# Patient Record
Sex: MALE | Race: WHITE | Employment: OTHER | URBAN - METROPOLITAN AREA
[De-identification: names, ages, dates, MRNs, and addresses within clinical notes are randomized per-mention and may not be internally consistent; named-entity substitution may affect disease eponyms.]

---

## 2017-02-21 ENCOUNTER — HOSPITAL ENCOUNTER (OUTPATIENT)
Dept: MRI IMAGING | Age: 82
Discharge: HOME | End: 2017-02-21
Attending: ORTHOPAEDIC SURGERY
Payer: MEDICARE

## 2017-02-21 DIAGNOSIS — M54.16 LUMBAR RADICULOPATHY: ICD-10-CM

## 2017-02-21 DIAGNOSIS — M51.36 DDD (DEGENERATIVE DISC DISEASE), LUMBAR: ICD-10-CM

## 2017-02-21 PROCEDURE — 72148 MRI LUMBAR SPINE W/O DYE: CPT

## 2018-01-04 ENCOUNTER — HOSPITAL ENCOUNTER (OUTPATIENT)
Dept: PREADMISSION TESTING | Age: 83
Discharge: HOME | End: 2018-01-04
Payer: MEDICARE

## 2018-01-04 ENCOUNTER — HOSPITAL ENCOUNTER (OUTPATIENT)
Dept: GENERAL RADIOLOGY | Age: 83
Discharge: HOME | End: 2018-01-04
Attending: ORTHOPAEDIC SURGERY
Payer: MEDICARE

## 2018-01-04 ENCOUNTER — APPOINTMENT (OUTPATIENT)
Dept: GENERAL RADIOLOGY | Age: 83
End: 2018-01-04
Attending: ORTHOPAEDIC SURGERY
Payer: MEDICARE

## 2018-01-04 VITALS
DIASTOLIC BLOOD PRESSURE: 70 MMHG | HEIGHT: 67 IN | TEMPERATURE: 99.1 F | RESPIRATION RATE: 21 BRPM | HEART RATE: 74 BPM | WEIGHT: 219.8 LBS | BODY MASS INDEX: 34.5 KG/M2 | OXYGEN SATURATION: 94 % | SYSTOLIC BLOOD PRESSURE: 143 MMHG

## 2018-01-04 LAB
ABO + RH BLD: NORMAL
ALBUMIN SERPL-MCNC: 3.3 G/DL (ref 3.5–5)
ALBUMIN/GLOB SERPL: 0.9 {RATIO} (ref 1.1–2.2)
ALP SERPL-CCNC: 117 U/L (ref 45–117)
ALT SERPL-CCNC: 21 U/L (ref 12–78)
ANION GAP SERPL CALC-SCNC: 11 MMOL/L (ref 5–15)
APPEARANCE UR: CLEAR
AST SERPL-CCNC: 18 U/L (ref 15–37)
ATRIAL RATE: 74 BPM
BACTERIA URNS QL MICRO: NEGATIVE /HPF
BASOPHILS # BLD: 0 K/UL (ref 0–0.1)
BASOPHILS NFR BLD: 0 % (ref 0–1)
BILIRUB SERPL-MCNC: 0.4 MG/DL (ref 0.2–1)
BILIRUB UR QL: NEGATIVE
BLOOD GROUP ANTIBODIES SERPL: NORMAL
BUN SERPL-MCNC: 13 MG/DL (ref 6–20)
BUN/CREAT SERPL: 12 (ref 12–20)
CALCIUM SERPL-MCNC: 8.9 MG/DL (ref 8.5–10.1)
CALCULATED P AXIS, ECG09: 85 DEGREES
CALCULATED R AXIS, ECG10: -18 DEGREES
CALCULATED T AXIS, ECG11: 49 DEGREES
CHLORIDE SERPL-SCNC: 104 MMOL/L (ref 97–108)
CO2 SERPL-SCNC: 29 MMOL/L (ref 21–32)
COLOR UR: ABNORMAL
CREAT SERPL-MCNC: 1.1 MG/DL (ref 0.7–1.3)
CRP SERPL-MCNC: 0.88 MG/DL
DIAGNOSIS, 93000: NORMAL
EOSINOPHIL # BLD: 0.1 K/UL (ref 0–0.4)
EOSINOPHIL NFR BLD: 2 % (ref 0–7)
EPITH CASTS URNS QL MICRO: ABNORMAL /LPF
ERYTHROCYTE [DISTWIDTH] IN BLOOD BY AUTOMATED COUNT: 14.9 % (ref 11.5–14.5)
ERYTHROCYTE [SEDIMENTATION RATE] IN BLOOD: 43 MM/HR (ref 0–20)
EST. AVERAGE GLUCOSE BLD GHB EST-MCNC: 103 MG/DL
GLOBULIN SER CALC-MCNC: 3.7 G/DL (ref 2–4)
GLUCOSE SERPL-MCNC: 94 MG/DL (ref 65–100)
GLUCOSE UR STRIP.AUTO-MCNC: NEGATIVE MG/DL
HBA1C MFR BLD: 5.2 % (ref 4.2–6.3)
HCT VFR BLD AUTO: 37.9 % (ref 36.6–50.3)
HGB BLD-MCNC: 12.3 G/DL (ref 12.1–17)
HGB UR QL STRIP: NEGATIVE
HYALINE CASTS URNS QL MICRO: ABNORMAL /LPF (ref 0–5)
KETONES UR QL STRIP.AUTO: NEGATIVE MG/DL
LEUKOCYTE ESTERASE UR QL STRIP.AUTO: NEGATIVE
LYMPHOCYTES # BLD: 1 K/UL (ref 0.8–3.5)
LYMPHOCYTES NFR BLD: 12 % (ref 12–49)
MCH RBC QN AUTO: 28.1 PG (ref 26–34)
MCHC RBC AUTO-ENTMCNC: 32.5 G/DL (ref 30–36.5)
MCV RBC AUTO: 86.7 FL (ref 80–99)
MONOCYTES # BLD: 0.6 K/UL (ref 0–1)
MONOCYTES NFR BLD: 7 % (ref 5–13)
NEUTS SEG # BLD: 6.8 K/UL (ref 1.8–8)
NEUTS SEG NFR BLD: 79 % (ref 32–75)
NITRITE UR QL STRIP.AUTO: NEGATIVE
P-R INTERVAL, ECG05: 160 MS
PH UR STRIP: 6.5 [PH] (ref 5–8)
PLATELET # BLD AUTO: 236 K/UL (ref 150–400)
POTASSIUM SERPL-SCNC: 3.9 MMOL/L (ref 3.5–5.1)
PROT SERPL-MCNC: 7 G/DL (ref 6.4–8.2)
PROT UR STRIP-MCNC: ABNORMAL MG/DL
Q-T INTERVAL, ECG07: 388 MS
QRS DURATION, ECG06: 112 MS
QTC CALCULATION (BEZET), ECG08: 430 MS
RBC # BLD AUTO: 4.37 M/UL (ref 4.1–5.7)
RBC #/AREA URNS HPF: ABNORMAL /HPF (ref 0–5)
SODIUM SERPL-SCNC: 144 MMOL/L (ref 136–145)
SP GR UR REFRACTOMETRY: 1.02 (ref 1–1.03)
SPECIMEN EXP DATE BLD: NORMAL
UA: UC IF INDICATED,UAUC: ABNORMAL
UROBILINOGEN UR QL STRIP.AUTO: 0.2 EU/DL (ref 0.2–1)
VENTRICULAR RATE, ECG03: 74 BPM
WBC # BLD AUTO: 8.6 K/UL (ref 4.1–11.1)
WBC URNS QL MICRO: ABNORMAL /HPF (ref 0–4)

## 2018-01-04 PROCEDURE — 86901 BLOOD TYPING SEROLOGIC RH(D): CPT | Performed by: ORTHOPAEDIC SURGERY

## 2018-01-04 PROCEDURE — 36415 COLL VENOUS BLD VENIPUNCTURE: CPT | Performed by: ORTHOPAEDIC SURGERY

## 2018-01-04 PROCEDURE — 85025 COMPLETE CBC W/AUTO DIFF WBC: CPT | Performed by: ORTHOPAEDIC SURGERY

## 2018-01-04 PROCEDURE — 85652 RBC SED RATE AUTOMATED: CPT | Performed by: ORTHOPAEDIC SURGERY

## 2018-01-04 PROCEDURE — 86140 C-REACTIVE PROTEIN: CPT | Performed by: ORTHOPAEDIC SURGERY

## 2018-01-04 PROCEDURE — 80053 COMPREHEN METABOLIC PANEL: CPT | Performed by: ORTHOPAEDIC SURGERY

## 2018-01-04 PROCEDURE — 71046 X-RAY EXAM CHEST 2 VIEWS: CPT

## 2018-01-04 PROCEDURE — 84466 ASSAY OF TRANSFERRIN: CPT | Performed by: ORTHOPAEDIC SURGERY

## 2018-01-04 PROCEDURE — 83036 HEMOGLOBIN GLYCOSYLATED A1C: CPT | Performed by: ORTHOPAEDIC SURGERY

## 2018-01-04 PROCEDURE — 93005 ELECTROCARDIOGRAM TRACING: CPT

## 2018-01-04 PROCEDURE — 81001 URINALYSIS AUTO W/SCOPE: CPT | Performed by: ORTHOPAEDIC SURGERY

## 2018-01-04 RX ORDER — HYDROCODONE BITARTRATE AND ACETAMINOPHEN 5; 325 MG/1; MG/1
1 TABLET ORAL
COMMUNITY
End: 2018-01-18

## 2018-01-04 RX ORDER — MONTELUKAST SODIUM 10 MG/1
10 TABLET ORAL EVERY EVENING
COMMUNITY

## 2018-01-04 RX ORDER — GABAPENTIN 300 MG/1
300 CAPSULE ORAL 3 TIMES DAILY
COMMUNITY

## 2018-01-04 RX ORDER — BUSPIRONE HYDROCHLORIDE 10 MG/1
10 TABLET ORAL 2 TIMES DAILY
COMMUNITY

## 2018-01-04 RX ORDER — RABEPRAZOLE SODIUM 20 MG/1
20 TABLET, DELAYED RELEASE ORAL DAILY
COMMUNITY

## 2018-01-04 NOTE — PERIOP NOTES
[facility]  PREOPERATIVE INSTRUCTIONS    Surgery Date:   Friday 1/12/18   Surgery arrival time given by surgeon: NO  (If [facility] staff will call you between 3pm - 7pm the day before surgery with your arrival time. If your surgery is on a Monday, we will call you the preceding Friday. Please call [phone] after 7pm if you did not receive your arrival time.) Phone call verification on Thursday 1/11/18. 1. Report  to the [address] [facility] on the day of your surgery. Bring your insurance card, photo identification, and any copayment (if applicable). 2. You must have a responsible adult to drive you home and stay with you the first 24 hours after surgery if you are going home the same day of your surgery. 3. Nothing to eat or drink after midnight the night before surgery. This means NO water, gum, mints, coffee, juice, etc.    4. MEDICATIONS TO TAKE THE MORNING OF SURGERY WITH A SIP OF WATER: Rabeprazole,Hydrocodone,Gabapentin,Fluoxetene,Metoclopramide, Buspirone. 5. No alcoholic beverages 24 hours before and after your surgery. 6. If you are being admitted to the hospital,please leave personal belongings/luggage in your car until you have an assigned hospital room number. ( The hospital discharge time is 12 PM NOON. Your adult  should be at the hospital prior to the noon discharge time unless otherwise instructed.)   7. STOP Aspirin and/or any non-steroidal anti-inflammatory drugs (i.e. Ibuprofen, Naproxen, Advil, Aleve) as directed by your surgeon. You may take Tylenol. Stop herbal supplements 1 week prior to  surgery. 8. If you are currently taking Plavix, Coumadin,or any other blood-thinning/ anticoagulant medication contact your surgeon for instructions. 9. Wear comfortable clothes. Wear your glasses instead of contacts. Please leave all money, jewelry and valuables at home. No make up, particularly mascara, the day of surgery.    10.  REMOVE ALL body piercings, rings,and jewelry and leave at home.  Wear your hair loose or down, no pony-tails, buns, or any metal hair clips. 11. If you shower the morning of surgery, please do not apply any lotions, powders, or deodorants afterwards. Do not shave any body area within 24 hours of your surgery. 12. Please follow all instructions to avoid any potential surgical cancellation. 13. Should your physical condition change, (i.e. fever, cold, flu, etc.) please notify your surgeon as soon as possible. 14. It is important to be on time. If a situation occurs where you may be delayed, please call:  [phone] / [phone] on the day of surgery. 15. The Preadmission Testing staff can be reached at [phone]. 16.  Special Instructions:  Use Chlorhexidine Care Fusion wash and sponges 3 days prior to surgery as instructed. Incentive spirometer given with instructions to practice at home and bring back to the hospital on the day of surgery. [facility] will contact you if your Hemoglobin A1C is greater than 7.5. Ensure/Glucerna  sample, nutritional information, and Ensure/Glucerna coupon given. Pain pamphlet and Call Don't Fall reminder reviewed with patient.  parking is complimentary Monday - Friday 7 am - 5 pm  Bring PTA Medication list day of surgery with the last doses taken documented   Do not bring medication bottles the day of surgery  16. The patient was contacted  in person. He  verbalize  understanding of all instructions does not  need reinforcement.

## 2018-01-04 NOTE — H&P
PAT Pre-Op History & Physical    Patient: Aura Barragan                  MRN: [id number]          SSN: [id number]  YOB: 1935          Age: 81 y.o. Sex: male                Subjective:     Patient is a 81 y.o.  male who presents with history of chronic right hip pain that started about three years ago. The pain is concentrated in his hip and will go down his leg to his knee. He uses a walker while in the house and presents today in a wheelchair. He states that sometimes the pain will wake him up at night. If he turns the wrong way the pain will become worse. Pain level ranges from 3/10 to 7/10 and is intermittent. He states his right leg will buckle and has caused two falls in the last three weeks. Patient is unable to go up and down steps r/t pain. Patient states he takes Norco Q8hrs for pain. The patient was evaluated in the surgeon's office and it was determined that the most appropriate plan of care is to proceed with surgical intervention. Patient's PCP Diana Clark MD      Past Medical History:   Diagnosis Date    Anxiety state, unspecified     Arthritis     Cancer ([facility] [address] 2001    prostate,     Depression     GERD (gastroesophageal reflux disease)     Hiatal hernia     Hypercholesteremia     Renal calculus     Seasonal allergic rhinitis       Past Surgical History:   Procedure Laterality Date    ENDOSCOPY, COLON, DIAGNOSTIC      HX APPENDECTOMY      HX CATARACT REMOVAL  2012    HX COLONOSCOPY      HX ENDOSCOPY      HX GI      colonoscopy    HX KNEE REPLACEMENT  02/18/14    right total knee    HX KNEE REPLACEMENT Left 10/2015    HX LITHOTRIPSY  11/2011    HX ORTHOPAEDIC  1992    rt. carpal tunnel    HX PROSTATECTOMY  2003    HX TONSILLECTOMY        Prior to Admission medications    Medication Sig Start Date End Date Taking? Authorizing Provider   busPIRone (BUSPAR) 10 mg tablet Take 10 mg by mouth daily.    Yes Historical Provider   RABEprazole (ACIPHEX) 20 mg tablet Take 20 mg by mouth daily. Yes Historical Provider   montelukast (SINGULAIR) 10 mg tablet Take 10 mg by mouth every evening. Yes Historical Provider   HYDROcodone-acetaminophen (NORCO) 5-325 mg per tablet Take 1 Tab by mouth every eight (8) hours as needed for Pain. Yes Historical Provider   gabapentin (NEURONTIN) 300 mg capsule Take 300 mg by mouth three (3) times daily. Yes Historical Provider   ezetimibe (ZETIA) 10 mg tablet Take 10 mg by mouth daily. Yes Mouna Roberson MD   fluoxetine (PROZAC) 20 mg capsule Take 1 capsule by mouth daily. 8/92/23  Yes Rylan Clifton MD   simvastatin (ZOCOR) 40 mg tablet Take 1 tablet by mouth nightly. 5/52/50  Yes Rylan Clifton MD   lorazepam (ATIVAN) 0.5 mg tablet Take 1 tablet by mouth two (2) times daily as needed for Anxiety. 4/01/80  Yes Rylan Clifton MD   loratadine (CLARITIN) 10 mg tablet Take 1 Tab by mouth daily. 8/58/87  Yes Rylan Clifton MD   cholecalciferol, vitamin D3, (VITAMIN D3) 2,000 unit Tab Take 1 Tab by mouth daily. 2/5/94  Yes Rylan Clifton MD   metoclopramide HCl (REGLAN) 10 mg tablet Take 1 Tab by mouth Before breakfast, lunch, dinner and at bedtime. 7/1/10  Yes Rylan Clifton MD     Current Outpatient Prescriptions   Medication Sig    busPIRone (BUSPAR) 10 mg tablet Take 10 mg by mouth daily.  RABEprazole (ACIPHEX) 20 mg tablet Take 20 mg by mouth daily.  montelukast (SINGULAIR) 10 mg tablet Take 10 mg by mouth every evening.  HYDROcodone-acetaminophen (NORCO) 5-325 mg per tablet Take 1 Tab by mouth every eight (8) hours as needed for Pain.  gabapentin (NEURONTIN) 300 mg capsule Take 300 mg by mouth three (3) times daily.  ezetimibe (ZETIA) 10 mg tablet Take 10 mg by mouth daily.  fluoxetine (PROZAC) 20 mg capsule Take 1 capsule by mouth daily.  simvastatin (ZOCOR) 40 mg tablet Take 1 tablet by mouth nightly.     lorazepam (ATIVAN) 0.5 mg tablet Take 1 tablet by mouth two (2) times daily as needed for Anxiety.  loratadine (CLARITIN) 10 mg tablet Take 1 Tab by mouth daily.  cholecalciferol, vitamin D3, (VITAMIN D3) 2,000 unit Tab Take 1 Tab by mouth daily.  metoclopramide HCl (REGLAN) 10 mg tablet Take 1 Tab by mouth Before breakfast, lunch, dinner and at bedtime. No current facility-administered medications for this encounter. Allergies   Allergen Reactions    Codeine Nausea and Vomiting    Fluvirin [id number] Other (comments)     GI upset    Naprosyn [Naproxen] Nausea and Vomiting     Gas        Social History   Substance Use Topics    Smoking status: Never Smoker    Smokeless tobacco: Never Used    Alcohol use No      History   Drug Use No     Family History   Problem Relation Age of Onset    Heart Disease Mother     Stroke Mother     Cancer Father      COLON    Arthritis-rheumatoid Neg Hx     Malignant Hyperthermia Neg Hx     Pseudocholinesterase Deficiency Neg Hx     Delayed Awakening Neg Hx     Post-op Nausea/Vomiting Neg Hx     Post-op Cognitive Dysfunction Neg Hx     Emergence Delirium Neg Hx          Review of Systems    Patient denies difficulty swallowing, mouth sores, or loose teeth. Patient denies any recent dental procedures or any planned prior to surgery. Patient denies chest pain, tightness, pain radiating down left arm, palpitations. Denies dizziness, visual disturbances, or lightheadedness. Patient denies shortness of breath, wheezing, cough, fever, or chills. Patient denies diarrhea, constipation, or abdominal pain. Patient denies urinary problems including dysuria, hesitancy, urgency, or incontinence. Denies skin breakdown, rashes, insect bites. Patient states that he has a scab on his left hand that he picks at.          Objective:     Patient Vitals for the past 24 hrs:   Temp Pulse Resp BP SpO2   18 1043 99.1 °F (37.3 °C) 74 21 143/70 94 %     Temp (24hrs), Av.1 °F (37.3 °C), Min:99.1 °F (37.3 °C), Max:99.1 °F (37.3 °C)    Body mass index is 34.43 kg/(m^2). Wt Readings from Last 1 Encounters:   01/04/18 99.7 kg (219 lb 12.8 oz)        Physical Exam:     General: Pleasant,  cooperative, no apparent distress, appears stated age. Arrived in wheelchair. Eyes: Conjunctivae/corneas clear. EOMs intact. Nose: Nares normal.   Mouth/Throat: Lips, mucosa, and tongue normal. Top dentures and no teeth on bottom. Lungs: Clear to auscultation bilaterally. Heart: Regular rate and rhythm, S1, S2 normal. No murmur, click, rub or gallop. Abdomen: Soft, non-tender. Bowel sounds normal. No distention. Musculoskeletal:  Unable to assess    Extremities:  Extremities normal, atraumatic, no cyanosis or edema. Calves                                 supple, non tender to palpation. Pulses: 2+ and symmetric bilateral upper extremities. Cap. refill <2 seconds   Skin: Healing scab on left hand, slight redness around borders, no drainage. Neurologic: CN II-XII grossly intact. Alert and oriented x3. Labs:   Recent Results (from the past 72 hour(s))   C REACTIVE PROTEIN, QT    Collection Time: 01/04/18 11:28 AM   Result Value Ref Range    C-Reactive protein 0.88 (H) <0.60 mg/dL   CBC WITH AUTOMATED DIFF    Collection Time: 01/04/18 11:28 AM   Result Value Ref Range    WBC 8.6 4.1 - 11.1 K/uL    RBC 4.37 4.10 - 5.70 M/uL    HGB 12.3 12.1 - 17.0 g/dL    HCT 37.9 36.6 - 50.3 %    MCV 86.7 80.0 - 99.0 FL    MCH 28.1 26.0 - 34.0 PG    MCHC 32.5 30.0 - 36.5 g/dL    RDW 14.9 (H) 11.5 - 14.5 %    PLATELET 312 087 - 880 K/uL    NEUTROPHILS 79 (H) 32 - 75 %    LYMPHOCYTES 12 12 - 49 %    MONOCYTES 7 5 - 13 %    EOSINOPHILS 2 0 - 7 %    BASOPHILS 0 0 - 1 %    ABS. NEUTROPHILS 6.8 1.8 - 8.0 K/UL    ABS. LYMPHOCYTES 1.0 0.8 - 3.5 K/UL    ABS. MONOCYTES 0.6 0.0 - 1.0 K/UL    ABS. EOSINOPHILS 0.1 0.0 - 0.4 K/UL    ABS.  BASOPHILS 0.0 0.0 - 0.1 K/UL   METABOLIC PANEL, COMPREHENSIVE    Collection Time: 01/04/18 11:28 AM   Result Value Ref Range    Sodium 144 136 - 145 mmol/L    Potassium 3.9 3.5 - 5.1 mmol/L    Chloride 104 97 - 108 mmol/L    CO2 29 21 - 32 mmol/L    Anion gap 11 5 - 15 mmol/L    Glucose 94 65 - 100 mg/dL    BUN 13 6 - 20 MG/DL    Creatinine 1.10 0.70 - 1.30 MG/DL    BUN/Creatinine ratio 12 12 - 20      GFR est AA >60 >60 ml/min/1.73m2    GFR est non-AA >60 >60 ml/min/1.73m2    Calcium 8.9 8.5 - 10.1 MG/DL    Bilirubin, total 0.4 0.2 - 1.0 MG/DL    ALT (SGPT) 21 12 - 78 U/L    AST (SGOT) 18 15 - 37 U/L    Alk.  phosphatase 117 45 - 117 U/L    Protein, total 7.0 6.4 - 8.2 g/dL    Albumin 3.3 (L) 3.5 - 5.0 g/dL    Globulin 3.7 2.0 - 4.0 g/dL    A-G Ratio 0.9 (L) 1.1 - 2.2     SED RATE (ESR)    Collection Time: 01/04/18 11:28 AM   Result Value Ref Range    Sed rate, automated 43 (H) 0 - 20 mm/hr   URINALYSIS W/ REFLEX CULTURE    Collection Time: 01/04/18 11:28 AM   Result Value Ref Range    Color YELLOW/STRAW      Appearance CLEAR CLEAR      Specific gravity 1.020 1.003 - 1.030      pH (UA) 6.5 5.0 - 8.0      Protein TRACE (A) NEG mg/dL    Glucose NEGATIVE  NEG mg/dL    Ketone NEGATIVE  NEG mg/dL    Bilirubin NEGATIVE  NEG      Blood NEGATIVE  NEG      Urobilinogen 0.2 0.2 - 1.0 EU/dL    Nitrites NEGATIVE  NEG      Leukocyte Esterase NEGATIVE  NEG      WBC 0-4 0 - 4 /hpf    RBC 0-5 0 - 5 /hpf    Epithelial cells FEW FEW /lpf    Bacteria NEGATIVE  NEG /hpf    UA:UC IF INDICATED CULTURE NOT INDICATED BY UA RESULT CNI      Hyaline cast 0-2 0 - 5 /lpf   TYPE & SCREEN    Collection Time: 01/04/18 11:28 AM   Result Value Ref Range    Crossmatch Expiration 01/15/2018     ABO/Rh(D) O POSITIVE     Antibody screen NEG    EKG, 12 LEAD, INITIAL    Collection Time: 01/04/18 12:05 PM   Result Value Ref Range    Ventricular Rate 74 BPM    Atrial Rate 74 BPM    P-R Interval 160 ms    QRS Duration 112 ms    Q-T Interval 388 ms    QTC Calculation (Bezet) 430 ms    Calculated P Axis 85 degrees    Calculated R Axis -18 degrees    Calculated T Axis 49 degrees    Diagnosis       Normal sinus rhythm  Incomplete right bundle branch block  Minimal voltage criteria for LVH, may be normal variant  Cannot rule out Anterior infarct , age undetermined  Abnormal ECG  When compared with ECG of 13-OCT-2015 10:13,  No significant change was found  Confirmed by Mark OSBORNE, Chente Benavidez ([id number]) on 1/4/2018 1:12:55 PM         Assessment:     OA Right Hip    Frequent Falls    Plan:     Scheduled for Right Total Hip Arthroplasty    Labs reviewed. Of note, ESR and CRP elevated at 43 & 0.88 respectively. EKG reviewed and no significant change was found from 2015 EKG. A1C, MRSA and Transferrin pending    Patient states that he is interested in going to a SNF after surgery. Will notify CM on floor.      Franklyn Richmond NP

## 2018-01-06 LAB — TRANSFERRIN SERPL-MCNC: 211 MG/DL (ref 200–370)

## 2018-01-07 LAB
BACTERIA SPEC CULT: ABNORMAL
SERVICE CMNT-IMP: ABNORMAL

## 2018-01-08 RX ORDER — MUPIROCIN 20 MG/G
OINTMENT TOPICAL 2 TIMES DAILY
Qty: 22 G | Refills: 0 | Status: SHIPPED | OUTPATIENT
Start: 2018-01-08 | End: 2018-01-13

## 2018-01-08 RX ORDER — VANCOMYCIN/0.9 % SOD CHLORIDE 1.5G/250ML
1500 PLASTIC BAG, INJECTION (ML) INTRAVENOUS ONCE
Status: CANCELLED | OUTPATIENT
Start: 2018-01-12 | End: 2018-01-12

## 2018-01-08 NOTE — PERIOP NOTES
Notification of Positive MRSA and Treatment Ordered by Preadmission Testing Nurse Practitioner      Patient Name:  Kaylie Kang  MRN: [id number]  : 1935    Surgeon: Dr. Jovanna Bo  Date of surgery: 18  Procedure: Right Total Hip Arthroplasty    Allergies: Allergies   Allergen Reactions    Codeine Nausea and Vomiting    Fluvirin [id number] Other (comments)     GI upset    Naprosyn [Naproxen] Nausea and Vomiting     Gas         MRSA results: All Micro Results     Procedure Component Value Units Date/Time    MRSA CULTURE NARES [[id number]]  (Abnormal) Collected:  18 1104    Order Status:  Completed Specimen:  Nares Updated:  18 0930     Special Requests: NO SPECIAL REQUESTS        Culture result: MRSA PRESENT (A)               CALLED TO AND READ BACK BY   [address], [place] ON 17 AT 0930                    Screening of patient nares for MRSA is for surveillance purposes and, if positive, to facilitate isolation considerations in high risk settings. It is not intended for automatic decolonization interventions per se as regimens are not sufficiently effective to warrant routine use. Treatment ordered: Bactroban ointment 2%- apply intranasal twice daily for 5 days    Pre operative antibiotic will be changed to Vancomycin using weight based dosing (<80kg = 1gm, >80kg = 1.5gm) unless patient has allergy to Vancomycin.     Date patient notified of results / treatment prescribed: 18    The patient was instructed to add medication and date they began treatment to their \"Prior to Admission Medication\" list given to them in [address], NP

## 2018-01-12 ENCOUNTER — ANESTHESIA EVENT (OUTPATIENT)
Dept: SURGERY | Age: 83
DRG: 470 | End: 2018-01-12
Payer: MEDICARE

## 2018-01-15 ENCOUNTER — APPOINTMENT (OUTPATIENT)
Dept: GENERAL RADIOLOGY | Age: 83
DRG: 470 | End: 2018-01-15
Attending: ORTHOPAEDIC SURGERY
Payer: MEDICARE

## 2018-01-15 ENCOUNTER — HOSPITAL ENCOUNTER (INPATIENT)
Age: 83
LOS: 3 days | Discharge: HOME HEALTH | DRG: 470 | End: 2018-01-18
Attending: ORTHOPAEDIC SURGERY | Admitting: ORTHOPAEDIC SURGERY
Payer: MEDICARE

## 2018-01-15 ENCOUNTER — APPOINTMENT (OUTPATIENT)
Dept: GENERAL RADIOLOGY | Age: 83
DRG: 470 | End: 2018-01-15
Attending: PHYSICIAN ASSISTANT
Payer: MEDICARE

## 2018-01-15 ENCOUNTER — ANESTHESIA (OUTPATIENT)
Dept: SURGERY | Age: 83
DRG: 470 | End: 2018-01-15
Payer: MEDICARE

## 2018-01-15 DIAGNOSIS — M16.11 PRIMARY OSTEOARTHRITIS OF RIGHT HIP: Primary | ICD-10-CM

## 2018-01-15 PROBLEM — Z96.649 S/P TOTAL HIP ARTHROPLASTY: Status: ACTIVE | Noted: 2018-01-15

## 2018-01-15 PROCEDURE — 74011250636 HC RX REV CODE- 250/636: Performed by: ANESTHESIOLOGY

## 2018-01-15 PROCEDURE — 77030002933 HC SUT MCRYL J&J -A: Performed by: ORTHOPAEDIC SURGERY

## 2018-01-15 PROCEDURE — 77030010507 HC ADH SKN DERMBND J&J -B: Performed by: ORTHOPAEDIC SURGERY

## 2018-01-15 PROCEDURE — 77030020263 HC SOL INJ SOD CL0.9% LFCR 1000ML: Performed by: ORTHOPAEDIC SURGERY

## 2018-01-15 PROCEDURE — 77030032490 HC SLV COMPR SCD KNE COVD -B

## 2018-01-15 PROCEDURE — 76060000064 HC AMB SURG ANES 2 TO 2.5 HR: Performed by: ORTHOPAEDIC SURGERY

## 2018-01-15 PROCEDURE — 74011250636 HC RX REV CODE- 250/636

## 2018-01-15 PROCEDURE — 77030018883 HC BLD SAW SAG4 STRY -B: Performed by: ORTHOPAEDIC SURGERY

## 2018-01-15 PROCEDURE — 77030018836 HC SOL IRR NACL ICUM -A: Performed by: ORTHOPAEDIC SURGERY

## 2018-01-15 PROCEDURE — 77030008467 HC STPLR SKN COVD -B: Performed by: ORTHOPAEDIC SURGERY

## 2018-01-15 PROCEDURE — 77030020788: Performed by: ORTHOPAEDIC SURGERY

## 2018-01-15 PROCEDURE — 0SR90JA REPLACEMENT OF RIGHT HIP JOINT WITH SYNTHETIC SUBSTITUTE, UNCEMENTED, OPEN APPROACH: ICD-10-PCS | Performed by: ORTHOPAEDIC SURGERY

## 2018-01-15 PROCEDURE — 74011000250 HC RX REV CODE- 250: Performed by: ORTHOPAEDIC SURGERY

## 2018-01-15 PROCEDURE — 77030011640 HC PAD GRND REM COVD -A: Performed by: ORTHOPAEDIC SURGERY

## 2018-01-15 PROCEDURE — 77030013708 HC HNDPC SUC IRR PULS STRY –B: Performed by: ORTHOPAEDIC SURGERY

## 2018-01-15 PROCEDURE — 77030035236 HC SUT PDS STRATFX BARB J&J -B: Performed by: ORTHOPAEDIC SURGERY

## 2018-01-15 PROCEDURE — 72170 X-RAY EXAM OF PELVIS: CPT

## 2018-01-15 PROCEDURE — 77030031139 HC SUT VCRL2 J&J -A: Performed by: ORTHOPAEDIC SURGERY

## 2018-01-15 PROCEDURE — 74011250637 HC RX REV CODE- 250/637: Performed by: PHYSICIAN ASSISTANT

## 2018-01-15 PROCEDURE — 77030007866 HC KT SPN ANES BBMI -B

## 2018-01-15 PROCEDURE — 74011000272 HC RX REV CODE- 272: Performed by: ORTHOPAEDIC SURGERY

## 2018-01-15 PROCEDURE — 76001 XR FLUOROSCOPY OVER 60 MINUTES: CPT

## 2018-01-15 PROCEDURE — 74011000258 HC RX REV CODE- 258

## 2018-01-15 PROCEDURE — 74011250637 HC RX REV CODE- 250/637: Performed by: ANESTHESIOLOGY

## 2018-01-15 PROCEDURE — 76210000035 HC AMBSU PH I REC 1 TO 1.5 HR: Performed by: ORTHOPAEDIC SURGERY

## 2018-01-15 PROCEDURE — 77030012935 HC DRSG AQUACEL BMS -B: Performed by: ORTHOPAEDIC SURGERY

## 2018-01-15 PROCEDURE — 74011000250 HC RX REV CODE- 250

## 2018-01-15 PROCEDURE — C1776 JOINT DEVICE (IMPLANTABLE): HCPCS | Performed by: ORTHOPAEDIC SURGERY

## 2018-01-15 PROCEDURE — 65270000029 HC RM PRIVATE

## 2018-01-15 PROCEDURE — 74011250636 HC RX REV CODE- 250/636: Performed by: ORTHOPAEDIC SURGERY

## 2018-01-15 PROCEDURE — 76030000021 HC AMB SURG 2 TO 2.5 HR INTENSV-TIER 1: Performed by: ORTHOPAEDIC SURGERY

## 2018-01-15 PROCEDURE — 77030020782 HC GWN BAIR PAWS FLX 3M -B

## 2018-01-15 PROCEDURE — 74011250636 HC RX REV CODE- 250/636: Performed by: PHYSICIAN ASSISTANT

## 2018-01-15 DEVICE — HEMISPHERICAL CLUSTER HOLE SHELL
Type: IMPLANTABLE DEVICE | Site: HIP | Status: FUNCTIONAL
Brand: TRITANIUM

## 2018-01-15 DEVICE — 0 DEGREE POLYETHYLENE INSERT
Type: IMPLANTABLE DEVICE | Site: HIP | Status: FUNCTIONAL
Brand: TRIDENT

## 2018-01-15 DEVICE — COMPONENT HIP PRSS FT MTL ON CERM POLYETH X3: Type: IMPLANTABLE DEVICE | Status: FUNCTIONAL

## 2018-01-15 DEVICE — 132 DEGREE NECK ANGLE HIP STEM
Type: IMPLANTABLE DEVICE | Site: HIP | Status: NON-FUNCTIONAL
Brand: ACCOLADE
Removed: 2018-01-31

## 2018-01-15 DEVICE — CERAMIC V40 FEMORAL HEAD
Type: IMPLANTABLE DEVICE | Site: HIP | Status: NON-FUNCTIONAL
Brand: BIOLOX
Removed: 2018-01-31

## 2018-01-15 RX ORDER — SODIUM CHLORIDE, SODIUM LACTATE, POTASSIUM CHLORIDE, CALCIUM CHLORIDE 600; 310; 30; 20 MG/100ML; MG/100ML; MG/100ML; MG/100ML
125 INJECTION, SOLUTION INTRAVENOUS CONTINUOUS
Status: DISCONTINUED | OUTPATIENT
Start: 2018-01-15 | End: 2018-01-15 | Stop reason: HOSPADM

## 2018-01-15 RX ORDER — SODIUM CHLORIDE 0.9 % (FLUSH) 0.9 %
5-10 SYRINGE (ML) INJECTION AS NEEDED
Status: DISCONTINUED | OUTPATIENT
Start: 2018-01-15 | End: 2018-01-18 | Stop reason: HOSPADM

## 2018-01-15 RX ORDER — DIPHENHYDRAMINE HYDROCHLORIDE 50 MG/ML
12.5 INJECTION, SOLUTION INTRAMUSCULAR; INTRAVENOUS
Status: ACTIVE | OUTPATIENT
Start: 2018-01-15 | End: 2018-01-16

## 2018-01-15 RX ORDER — ASPIRIN 325 MG
325 TABLET, DELAYED RELEASE (ENTERIC COATED) ORAL 2 TIMES DAILY
Qty: 60 TAB | Refills: 0 | Status: SHIPPED | OUTPATIENT
Start: 2018-01-15

## 2018-01-15 RX ORDER — LIDOCAINE HYDROCHLORIDE 10 MG/ML
0.1 INJECTION, SOLUTION EPIDURAL; INFILTRATION; INTRACAUDAL; PERINEURAL AS NEEDED
Status: DISCONTINUED | OUTPATIENT
Start: 2018-01-15 | End: 2018-01-17 | Stop reason: ALTCHOICE

## 2018-01-15 RX ORDER — CEFAZOLIN SODIUM IN 0.9 % NACL 2 G/50 ML
2 INTRAVENOUS SOLUTION, PIGGYBACK (ML) INTRAVENOUS ONCE
Status: COMPLETED | OUTPATIENT
Start: 2018-01-15 | End: 2018-01-15

## 2018-01-15 RX ORDER — CELECOXIB 100 MG/1
100 CAPSULE ORAL ONCE
Status: COMPLETED | OUTPATIENT
Start: 2018-01-15 | End: 2018-01-15

## 2018-01-15 RX ORDER — EPHEDRINE SULFATE 50 MG/ML
INJECTION, SOLUTION INTRAVENOUS AS NEEDED
Status: DISCONTINUED | OUTPATIENT
Start: 2018-01-15 | End: 2018-01-15 | Stop reason: HOSPADM

## 2018-01-15 RX ORDER — MIDAZOLAM HYDROCHLORIDE 1 MG/ML
INJECTION, SOLUTION INTRAMUSCULAR; INTRAVENOUS AS NEEDED
Status: DISCONTINUED | OUTPATIENT
Start: 2018-01-15 | End: 2018-01-15 | Stop reason: HOSPADM

## 2018-01-15 RX ORDER — RABEPRAZOLE SODIUM 20 MG/1
20 TABLET, DELAYED RELEASE ORAL DAILY
Status: DISCONTINUED | OUTPATIENT
Start: 2018-01-16 | End: 2018-01-15 | Stop reason: CLARIF

## 2018-01-15 RX ORDER — POVIDONE-IODINE 10 %
SOLUTION, NON-ORAL TOPICAL AS NEEDED
Status: DISCONTINUED | OUTPATIENT
Start: 2018-01-15 | End: 2018-01-15 | Stop reason: HOSPADM

## 2018-01-15 RX ORDER — SODIUM CHLORIDE 0.9 % (FLUSH) 0.9 %
5-10 SYRINGE (ML) INJECTION AS NEEDED
Status: DISCONTINUED | OUTPATIENT
Start: 2018-01-15 | End: 2018-01-17 | Stop reason: ALTCHOICE

## 2018-01-15 RX ORDER — FACIAL-BODY WIPES
10 EACH TOPICAL DAILY PRN
Status: DISCONTINUED | OUTPATIENT
Start: 2018-01-17 | End: 2018-01-18 | Stop reason: HOSPADM

## 2018-01-15 RX ORDER — BUPIVACAINE HYDROCHLORIDE 7.5 MG/ML
INJECTION, SOLUTION EPIDURAL; RETROBULBAR AS NEEDED
Status: DISCONTINUED | OUTPATIENT
Start: 2018-01-15 | End: 2018-01-15 | Stop reason: HOSPADM

## 2018-01-15 RX ORDER — LORAZEPAM 0.5 MG/1
0.5 TABLET ORAL
Status: DISCONTINUED | OUTPATIENT
Start: 2018-01-15 | End: 2018-01-18 | Stop reason: HOSPADM

## 2018-01-15 RX ORDER — CELECOXIB 100 MG/1
200 CAPSULE ORAL 2 TIMES DAILY
Status: DISCONTINUED | OUTPATIENT
Start: 2018-01-15 | End: 2018-01-18 | Stop reason: HOSPADM

## 2018-01-15 RX ORDER — FAMOTIDINE 20 MG/1
20 TABLET, FILM COATED ORAL 2 TIMES DAILY
Status: DISCONTINUED | OUTPATIENT
Start: 2018-01-15 | End: 2018-01-18 | Stop reason: HOSPADM

## 2018-01-15 RX ORDER — ONDANSETRON 2 MG/ML
4 INJECTION INTRAMUSCULAR; INTRAVENOUS
Status: ACTIVE | OUTPATIENT
Start: 2018-01-15 | End: 2018-01-16

## 2018-01-15 RX ORDER — EZETIMIBE 10 MG/1
10 TABLET ORAL DAILY
Status: DISCONTINUED | OUTPATIENT
Start: 2018-01-16 | End: 2018-01-18 | Stop reason: HOSPADM

## 2018-01-15 RX ORDER — OXYCODONE HYDROCHLORIDE 5 MG/1
5 TABLET ORAL
Status: DISCONTINUED | OUTPATIENT
Start: 2018-01-15 | End: 2018-01-18 | Stop reason: HOSPADM

## 2018-01-15 RX ORDER — LORATADINE 10 MG/1
10 TABLET ORAL DAILY
Status: DISCONTINUED | OUTPATIENT
Start: 2018-01-16 | End: 2018-01-18 | Stop reason: HOSPADM

## 2018-01-15 RX ORDER — PANTOPRAZOLE SODIUM 40 MG/1
40 TABLET, DELAYED RELEASE ORAL DAILY
Status: DISCONTINUED | OUTPATIENT
Start: 2018-01-16 | End: 2018-01-18 | Stop reason: HOSPADM

## 2018-01-15 RX ORDER — MONTELUKAST SODIUM 10 MG/1
10 TABLET ORAL EVERY EVENING
Status: DISCONTINUED | OUTPATIENT
Start: 2018-01-15 | End: 2018-01-18 | Stop reason: HOSPADM

## 2018-01-15 RX ORDER — BUSPIRONE HYDROCHLORIDE 5 MG/1
10 TABLET ORAL DAILY
Status: DISCONTINUED | OUTPATIENT
Start: 2018-01-16 | End: 2018-01-18 | Stop reason: HOSPADM

## 2018-01-15 RX ORDER — ACETAMINOPHEN 325 MG/1
650 TABLET ORAL EVERY 6 HOURS
Status: DISCONTINUED | OUTPATIENT
Start: 2018-01-15 | End: 2018-01-18 | Stop reason: HOSPADM

## 2018-01-15 RX ORDER — FENTANYL CITRATE 50 UG/ML
25 INJECTION, SOLUTION INTRAMUSCULAR; INTRAVENOUS
Status: DISCONTINUED | OUTPATIENT
Start: 2018-01-15 | End: 2018-01-17 | Stop reason: ALTCHOICE

## 2018-01-15 RX ORDER — PROPOFOL 10 MG/ML
INJECTION, EMULSION INTRAVENOUS
Status: DISCONTINUED | OUTPATIENT
Start: 2018-01-15 | End: 2018-01-15 | Stop reason: HOSPADM

## 2018-01-15 RX ORDER — FLUOXETINE HYDROCHLORIDE 20 MG/1
20 CAPSULE ORAL DAILY
Status: DISCONTINUED | OUTPATIENT
Start: 2018-01-16 | End: 2018-01-18 | Stop reason: HOSPADM

## 2018-01-15 RX ORDER — HYDROMORPHONE HYDROCHLORIDE 2 MG/ML
0.5 INJECTION, SOLUTION INTRAMUSCULAR; INTRAVENOUS; SUBCUTANEOUS
Status: DISPENSED | OUTPATIENT
Start: 2018-01-15 | End: 2018-01-16

## 2018-01-15 RX ORDER — PREGABALIN 75 MG/1
75 CAPSULE ORAL ONCE
Status: COMPLETED | OUTPATIENT
Start: 2018-01-15 | End: 2018-01-15

## 2018-01-15 RX ORDER — SODIUM CHLORIDE 0.9 % (FLUSH) 0.9 %
5-10 SYRINGE (ML) INJECTION AS NEEDED
Status: DISCONTINUED | OUTPATIENT
Start: 2018-01-15 | End: 2018-01-15 | Stop reason: HOSPADM

## 2018-01-15 RX ORDER — SODIUM CHLORIDE 0.9 % (FLUSH) 0.9 %
5-10 SYRINGE (ML) INJECTION EVERY 8 HOURS
Status: DISCONTINUED | OUTPATIENT
Start: 2018-01-16 | End: 2018-01-18 | Stop reason: HOSPADM

## 2018-01-15 RX ORDER — TRAMADOL HYDROCHLORIDE 50 MG/1
50 TABLET ORAL
Qty: 60 TAB | Refills: 0 | Status: SHIPPED | OUTPATIENT
Start: 2018-01-15 | End: 2019-11-07 | Stop reason: ALTCHOICE

## 2018-01-15 RX ORDER — CEFAZOLIN SODIUM IN 0.9 % NACL 2 G/50 ML
2 INTRAVENOUS SOLUTION, PIGGYBACK (ML) INTRAVENOUS ONCE
Status: DISCONTINUED | OUTPATIENT
Start: 2018-01-15 | End: 2018-01-15 | Stop reason: SDUPTHER

## 2018-01-15 RX ORDER — OXYCODONE HYDROCHLORIDE 5 MG/1
5-10 TABLET ORAL
Qty: 70 TAB | Refills: 0 | Status: SHIPPED | OUTPATIENT
Start: 2018-01-15 | End: 2018-02-05

## 2018-01-15 RX ORDER — ACETAMINOPHEN 500 MG
500-1000 TABLET ORAL
Qty: 60 TAB | Refills: 0 | Status: SHIPPED | OUTPATIENT
Start: 2018-01-15 | End: 2018-01-30

## 2018-01-15 RX ORDER — ACETAMINOPHEN 325 MG/1
975 TABLET ORAL ONCE
Status: COMPLETED | OUTPATIENT
Start: 2018-01-15 | End: 2018-01-15

## 2018-01-15 RX ORDER — HYDROMORPHONE HYDROCHLORIDE 2 MG/ML
.5-1 INJECTION, SOLUTION INTRAMUSCULAR; INTRAVENOUS; SUBCUTANEOUS
Status: DISCONTINUED | OUTPATIENT
Start: 2018-01-15 | End: 2018-01-15 | Stop reason: HOSPADM

## 2018-01-15 RX ORDER — CEFAZOLIN SODIUM IN 0.9 % NACL 2 G/50 ML
2 INTRAVENOUS SOLUTION, PIGGYBACK (ML) INTRAVENOUS EVERY 8 HOURS
Status: COMPLETED | OUTPATIENT
Start: 2018-01-15 | End: 2018-01-18

## 2018-01-15 RX ORDER — ASPIRIN 325 MG
325 TABLET, DELAYED RELEASE (ENTERIC COATED) ORAL 2 TIMES DAILY
Status: DISCONTINUED | OUTPATIENT
Start: 2018-01-15 | End: 2018-01-18 | Stop reason: HOSPADM

## 2018-01-15 RX ORDER — SODIUM CHLORIDE 0.9 % (FLUSH) 0.9 %
5-10 SYRINGE (ML) INJECTION EVERY 8 HOURS
Status: DISCONTINUED | OUTPATIENT
Start: 2018-01-15 | End: 2018-01-16 | Stop reason: SDUPTHER

## 2018-01-15 RX ORDER — SODIUM CHLORIDE, SODIUM LACTATE, POTASSIUM CHLORIDE, CALCIUM CHLORIDE 600; 310; 30; 20 MG/100ML; MG/100ML; MG/100ML; MG/100ML
125 INJECTION, SOLUTION INTRAVENOUS CONTINUOUS
Status: DISPENSED | OUTPATIENT
Start: 2018-01-15 | End: 2018-01-16

## 2018-01-15 RX ORDER — SODIUM CHLORIDE, SODIUM LACTATE, POTASSIUM CHLORIDE, CALCIUM CHLORIDE 600; 310; 30; 20 MG/100ML; MG/100ML; MG/100ML; MG/100ML
INJECTION, SOLUTION INTRAVENOUS
Status: DISCONTINUED | OUTPATIENT
Start: 2018-01-15 | End: 2018-01-15 | Stop reason: HOSPADM

## 2018-01-15 RX ORDER — SODIUM CHLORIDE 9 MG/ML
125 INJECTION, SOLUTION INTRAVENOUS CONTINUOUS
Status: DISPENSED | OUTPATIENT
Start: 2018-01-15 | End: 2018-01-16

## 2018-01-15 RX ORDER — ONDANSETRON 2 MG/ML
4 INJECTION INTRAMUSCULAR; INTRAVENOUS AS NEEDED
Status: DISCONTINUED | OUTPATIENT
Start: 2018-01-15 | End: 2018-01-15 | Stop reason: HOSPADM

## 2018-01-15 RX ORDER — GABAPENTIN 300 MG/1
300 CAPSULE ORAL 3 TIMES DAILY
Status: DISCONTINUED | OUTPATIENT
Start: 2018-01-15 | End: 2018-01-18 | Stop reason: HOSPADM

## 2018-01-15 RX ORDER — KETOROLAC TROMETHAMINE 30 MG/ML
15 INJECTION, SOLUTION INTRAMUSCULAR; INTRAVENOUS
Status: ACTIVE | OUTPATIENT
Start: 2018-01-15 | End: 2018-01-16

## 2018-01-15 RX ORDER — NALOXONE HYDROCHLORIDE 0.4 MG/ML
0.4 INJECTION, SOLUTION INTRAMUSCULAR; INTRAVENOUS; SUBCUTANEOUS AS NEEDED
Status: DISCONTINUED | OUTPATIENT
Start: 2018-01-15 | End: 2018-01-18 | Stop reason: HOSPADM

## 2018-01-15 RX ORDER — SIMVASTATIN 20 MG/1
40 TABLET, FILM COATED ORAL
Status: DISCONTINUED | OUTPATIENT
Start: 2018-01-15 | End: 2018-01-18 | Stop reason: HOSPADM

## 2018-01-15 RX ORDER — METOCLOPRAMIDE 10 MG/1
10 TABLET ORAL
Status: DISCONTINUED | OUTPATIENT
Start: 2018-01-15 | End: 2018-01-18 | Stop reason: HOSPADM

## 2018-01-15 RX ORDER — FENTANYL CITRATE 50 UG/ML
INJECTION, SOLUTION INTRAMUSCULAR; INTRAVENOUS AS NEEDED
Status: DISCONTINUED | OUTPATIENT
Start: 2018-01-15 | End: 2018-01-15 | Stop reason: HOSPADM

## 2018-01-15 RX ORDER — ONDANSETRON 8 MG/1
4 TABLET, ORALLY DISINTEGRATING ORAL
Qty: 30 TAB | Refills: 0 | Status: SHIPPED | OUTPATIENT
Start: 2018-01-15

## 2018-01-15 RX ORDER — AMOXICILLIN 250 MG
1 CAPSULE ORAL 2 TIMES DAILY
Status: DISCONTINUED | OUTPATIENT
Start: 2018-01-15 | End: 2018-01-18 | Stop reason: HOSPADM

## 2018-01-15 RX ORDER — OXYCODONE HYDROCHLORIDE 5 MG/1
5 TABLET ORAL
Status: COMPLETED | OUTPATIENT
Start: 2018-01-15 | End: 2018-01-15

## 2018-01-15 RX ORDER — POLYETHYLENE GLYCOL 3350 17 G/17G
17 POWDER, FOR SOLUTION ORAL DAILY
Status: DISCONTINUED | OUTPATIENT
Start: 2018-01-16 | End: 2018-01-18 | Stop reason: HOSPADM

## 2018-01-15 RX ORDER — OXYCODONE HYDROCHLORIDE 5 MG/1
10 TABLET ORAL
Status: DISCONTINUED | OUTPATIENT
Start: 2018-01-15 | End: 2018-01-18 | Stop reason: HOSPADM

## 2018-01-15 RX ADMIN — EPHEDRINE SULFATE 10 MG: 50 INJECTION, SOLUTION INTRAVENOUS at 12:15

## 2018-01-15 RX ADMIN — MONTELUKAST SODIUM 10 MG: 10 TABLET, FILM COATED ORAL at 19:06

## 2018-01-15 RX ADMIN — SODIUM CHLORIDE, SODIUM LACTATE, POTASSIUM CHLORIDE, AND CALCIUM CHLORIDE 125 ML/HR: 600; 310; 30; 20 INJECTION, SOLUTION INTRAVENOUS at 10:17

## 2018-01-15 RX ADMIN — ACETAMINOPHEN 975 MG: 325 TABLET ORAL at 10:19

## 2018-01-15 RX ADMIN — SODIUM CHLORIDE, SODIUM LACTATE, POTASSIUM CHLORIDE, CALCIUM CHLORIDE: 600; 310; 30; 20 INJECTION, SOLUTION INTRAVENOUS at 13:15

## 2018-01-15 RX ADMIN — ACETAMINOPHEN 650 MG: 325 TABLET ORAL at 19:06

## 2018-01-15 RX ADMIN — ACETAMINOPHEN 650 MG: 325 TABLET ORAL at 23:08

## 2018-01-15 RX ADMIN — METOCLOPRAMIDE HYDROCHLORIDE 10 MG: 10 TABLET ORAL at 19:06

## 2018-01-15 RX ADMIN — OXYCODONE HYDROCHLORIDE 5 MG: 5 TABLET ORAL at 10:19

## 2018-01-15 RX ADMIN — ASPIRIN 325 MG: 325 TABLET, DELAYED RELEASE ORAL at 19:06

## 2018-01-15 RX ADMIN — METOCLOPRAMIDE HYDROCHLORIDE 10 MG: 10 TABLET ORAL at 23:08

## 2018-01-15 RX ADMIN — OXYCODONE HYDROCHLORIDE 5 MG: 5 TABLET ORAL at 23:08

## 2018-01-15 RX ADMIN — SIMVASTATIN 40 MG: 20 TABLET, FILM COATED ORAL at 21:24

## 2018-01-15 RX ADMIN — SODIUM CHLORIDE 125 ML/HR: 900 INJECTION, SOLUTION INTRAVENOUS at 19:09

## 2018-01-15 RX ADMIN — GABAPENTIN 300 MG: 300 CAPSULE ORAL at 23:08

## 2018-01-15 RX ADMIN — MIDAZOLAM HYDROCHLORIDE 1 MG: 1 INJECTION, SOLUTION INTRAMUSCULAR; INTRAVENOUS at 11:10

## 2018-01-15 RX ADMIN — GABAPENTIN 300 MG: 300 CAPSULE ORAL at 19:06

## 2018-01-15 RX ADMIN — PROPOFOL 50 MCG/KG/MIN: 10 INJECTION, EMULSION INTRAVENOUS at 11:36

## 2018-01-15 RX ADMIN — BUPIVACAINE HYDROCHLORIDE 2.5 ML: 7.5 INJECTION, SOLUTION EPIDURAL; RETROBULBAR at 11:19

## 2018-01-15 RX ADMIN — HYDROMORPHONE HYDROCHLORIDE 0.5 MG: 2 INJECTION INTRAMUSCULAR; INTRAVENOUS; SUBCUTANEOUS at 19:06

## 2018-01-15 RX ADMIN — EPHEDRINE SULFATE 10 MG: 50 INJECTION, SOLUTION INTRAVENOUS at 12:32

## 2018-01-15 RX ADMIN — SODIUM CHLORIDE, SODIUM LACTATE, POTASSIUM CHLORIDE, CALCIUM CHLORIDE: 600; 310; 30; 20 INJECTION, SOLUTION INTRAVENOUS at 11:25

## 2018-01-15 RX ADMIN — FENTANYL CITRATE 50 MCG: 50 INJECTION, SOLUTION INTRAMUSCULAR; INTRAVENOUS at 11:10

## 2018-01-15 RX ADMIN — FAMOTIDINE 20 MG: 20 TABLET, FILM COATED ORAL at 19:06

## 2018-01-15 RX ADMIN — CEFAZOLIN 2 G: 1 INJECTION, POWDER, FOR SOLUTION INTRAMUSCULAR; INTRAVENOUS; PARENTERAL at 11:31

## 2018-01-15 RX ADMIN — MIDAZOLAM HYDROCHLORIDE 2 MG: 1 INJECTION, SOLUTION INTRAMUSCULAR; INTRAVENOUS at 11:32

## 2018-01-15 RX ADMIN — CEFAZOLIN 2 G: 1 INJECTION, POWDER, FOR SOLUTION INTRAMUSCULAR; INTRAVENOUS; PARENTERAL at 19:08

## 2018-01-15 RX ADMIN — SODIUM CHLORIDE, SODIUM LACTATE, POTASSIUM CHLORIDE, CALCIUM CHLORIDE: 600; 310; 30; 20 INJECTION, SOLUTION INTRAVENOUS at 11:08

## 2018-01-15 RX ADMIN — CELECOXIB 100 MG: 100 CAPSULE ORAL at 10:19

## 2018-01-15 RX ADMIN — PREGABALIN 75 MG: 75 CAPSULE ORAL at 10:19

## 2018-01-15 RX ADMIN — CELECOXIB 200 MG: 100 CAPSULE ORAL at 19:06

## 2018-01-15 RX ADMIN — DOCUSATE SODIUM AND SENNOSIDES 1 TABLET: 8.6; 5 TABLET, FILM COATED ORAL at 19:06

## 2018-01-15 NOTE — PROGRESS NOTES
Problem: Falls - Risk of  Goal: *Absence of Falls  Document Khris Fall Risk and appropriate interventions in the flowsheet.    Outcome: Progressing Towards Goal  Fall Risk Interventions:  Mobility Interventions: Bed/chair exit alarm, Communicate number of staff needed for ambulation/transfer, Patient to call before getting OOB         Medication Interventions: Evaluate medications/consider consulting pharmacy, Patient to call before getting OOB         History of Falls Interventions: Bed/chair exit alarm, Consult care management for discharge planning, Door open when patient unattended

## 2018-01-15 NOTE — ANESTHESIA PROCEDURE NOTES
Spinal Block    Start time: 1/15/2018 11:10 AM  End time: 1/15/2018 11:20 AM  Performed by: Rod Maloney by: Ed Pittman     Pre-procedure:   Indications: at surgeon's request and primary anesthetic  Preanesthetic Checklist: patient identified, risks and benefits discussed, anesthesia consent, site marked, patient being monitored and timeout performed    Timeout Time: 11:10          Spinal Block:   Patient Position:  Seated  Prep Region:  Lumbar  Prep: Betadine      Location:  L3-4  Technique:  Single shot        Needle:   Needle Type:  Pencan  Needle Gauge:  25 G  Attempts:  1      Events: CSF confirmed, no blood with aspiration and no paresthesia        Assessment:  Insertion:  Uncomplicated  Patient tolerance:  Patient tolerated the procedure well with no immediate complications

## 2018-01-15 NOTE — IP AVS SNAPSHOT
[address] [facility] 
 
 
 [address] [facility] 
[phone] Patient: Debra Bloom MRN: [id number] UYX:[id number] About your hospitalization You were admitted on:  January 15, 2018 You last received care in the:  SF 4M POST SURG ORT 2 You were discharged on:  January 18, 2018 Why you were hospitalized Your primary diagnosis was:  Primary Osteoarthritis Of Right Hip Your diagnoses also included:  S/P Total Hip Arthroplasty Follow-up Information Follow up With Details Comments Contact Info SHERITA   [address] [place] [address] 
[phone] Shantanu Newberry MD   [facility] [address] [facility] 
[phone] Discharge Orders None A check pablo indicates which time of day the medication should be taken. My Medications START taking these medications Instructions Each Dose to Equal  
 Morning Noon Evening Bedtime  
 acetaminophen 500 mg tablet Commonly known as:  Jr Moncho Mojica  Your last dose was:  1/18/18  At 11:20 Take 1-2 Tabs by mouth every six (6) hours as needed for Pain. Not to exceed 4,000mg in any 24 hour period  Indications: Pain 500-1000 mg  
    
   
   
   
  
 aspirin delayed-release 325 mg tablet Your last dose was:  09:03 Your next dose is: This Evening Take 1 Tab by mouth two (2) times a day. 325 mg  
    
   
   
   
  
 ondansetron 8 mg disintegrating tablet Commonly known as:  ZOFRAN ODT Take 0.5 Tabs by mouth every eight (8) hours as needed for Nausea. 4 mg  
    
   
   
   
  
 oxyCODONE IR 5 mg immediate release tablet Commonly known as:  Rudy Lawrence Your last dose was:  1/16/18 at 6:20 Take 1-2 Tabs by mouth every four (4) hours as needed for Pain. Max Daily Amount: 60 mg. Indications: Pain 5-10 mg  
    
   
   
   
  
 traMADol 50 mg tablet Commonly known as:  Alessia Mccurdy  
   
 Take 1 Tab by mouth every six (6) hours as needed for Pain (Take for breakthrough pain if Oxycodone is not working). Max Daily Amount: 200 mg. Indications: Pain, Post-op Pain, Diagnosis Hip and Knee Arthritis ICD 10 - M16.9  
 50 mg CONTINUE taking these medications Instructions Each Dose to Equal  
 Morning Noon Evening Bedtime  
 busPIRone 10 mg tablet Commonly known as:  BUSPAR Your last dose was:  09:03 Take 10 mg by mouth daily. 10 mg  
    
   
   
   
  
 cholecalciferol (vitamin D3) 2,000 unit Tab Commonly known as:  VITAMIN D3 Take 1 Tab by mouth daily. 1 Tab CLARITIN 10 mg tablet Generic drug:  loratadine Your last dose was:  09:03 Take 1 Tab by mouth daily. 10 mg FLUoxetine 20 mg capsule Commonly known as:  PROzac Your last dose was:  09:03 Take 1 capsule by mouth daily. 20 mg  
    
   
   
   
  
 gabapentin 300 mg capsule Commonly known as:  NEURONTIN Your last dose was:  09:03 Take 300 mg by mouth three (3) times daily. 300 mg LORazepam 0.5 mg tablet Commonly known as:  ATIVAN Take 1 tablet by mouth two (2) times daily as needed for Anxiety. 0.5 mg  
    
   
   
   
  
 metoclopramide HCl 10 mg tablet Commonly known as:  REGLAN Your next dose is:  Afternoon and Evening Take 1 Tab by mouth Before breakfast, lunch, dinner and at bedtime. 10 mg  
    
   
   
   
  
 montelukast 10 mg tablet Commonly known as:  SINGULAIR Your last dose was:  1/17/18 at 6:00 PM  
Your next dose is: This evening Take 10 mg by mouth every evening. 10 mg  
    
   
   
   
  
 RABEprazole 20 mg tablet Commonly known as:  ACIPHEX Take 20 mg by mouth daily. 20 mg  
    
   
   
   
  
 simvastatin 40 mg tablet Commonly known as:  ZOCOR Your last dose was:  1/17/18 at 10:30  
 Your next dose is: This Evening Take 1 tablet by mouth nightly. 40 mg  
    
   
   
   
  
 ZETIA 10 mg tablet Generic drug:  ezetimibe Your last dose was:  1/18/18   09:03 Take 10 mg by mouth daily. 10 mg  
    
   
   
   
  
  
STOP taking these medications HYDROcodone-acetaminophen 5-325 mg per tablet Commonly known as:  Blackwood Dionne Where to Get Your Medications Information on where to get these meds will be given to you by the nurse or doctor. ! Ask your nurse or doctor about these medications  
  acetaminophen 500 mg tablet  
 aspirin delayed-release 325 mg tablet  
 ondansetron 8 mg disintegrating tablet  
 oxyCODONE IR 5 mg immediate release tablet  
 traMADol 50 mg tablet Discharge Instructions TOTAL HIP DISCHARGE INSTRUCTIONS Patient: Ian Delcid MRN: [id number]  SSN: [id number] Please take the time to review the following instructions before you leave the hospital and use them as guidelines during your recovery from surgery. If you have any questions you may contact my office at [phone] ext [phone] or [phone]. After hours or during the weekend you may reach me personally at [phone] if there is an emergency. SPECIAL INSTRUCTIONS :  
1. Do not bend greater than 90 degrees at the hip for 4 weeks following your discharge 2. Avoid exercises or activities which bring the leg out or away from the mid-line of the body. The surgical repair involves this muscle and it will require 4 weeks to heal. You may disregard these instructions for a direct anterior approach. 3. You may walk as tolerated and are encouraged to work daily on progressing your activities with a walker initially. 4. You may transition to a cane for walking 5-7 days from surgery once you feel safe. You may use a walker for longer periods if you feel unstable. Wound Care/ Dressing Changes: DRESSING :  
 
 Aquacel Dressing : This may be removed by home health 7 days after the date of your surgery. If there is no drainage, then a simple dressing may be used or no dressing at all. Other dressing options can be purchased over the counter at a local pharmacy or medical supply vendor. A porous adhesive dressing such as pictured above can be purchased at a local [facility] or [place]. You only need to keep the incision covered for 7 days after showers. A dressing may be used for longer if there are issues with clothing clinging to the incision. Showering/ Bathing: You may shower with the aquacel dressing in place. This is left in place for 7 days following discharge from the hospital. If your incision is dry without drainage you may shower following your discharge home. After 7 days your aquacel dressing should be removed for showering. It is fine to have water run over the incision. Do not vigorously scrub your incision. Apply a clean, dry dressing after you have dried your incision. Do not take a bath or get into a swimming pool / [place] until you follow up with Dr. Dayan Granda. Do not soak your incision under water. If there is continued drainage or you are concerned contact Dr Perry Post office prior to showering [phone] ext [phone] or [phone]. Diet: 
You may advance to your regular diet as tolerated. Increase your clear liquid intake for the next 2-3 days. Medication: 
 
 
1. You will be given prescriptions for pain medication when you are discharged from the hospital. The side effects of these medications can be substantial and the narcotic medications are not mandatory. You may substitute these medications with Tylenol or Alleve / Motrin. 2.  Please use the medications as prescribed. Pain medications may cause constipation- Colace twice daily and Miralax one scoop daily while taking the narcotic medication should help prevent constipation.  Please discuss with your local pharmacist regarding increasing this dosage if constipation persists. Other possible side effects of pain medication are dizziness, headache, nausea, vomiting, and urinary retention. Discontinue the pain medication if you develop itching, rash, shortness of breath, or difficulties swallowing. If these symptoms become severe or are not relieved by discontinuing the medication, you should seek immediate medical attention. 3. Refills of pain medication are authorized during office hours only (8 AM- 5 PM  Monday thru Friday). Many of these medication will require you or a family member to pick-up a physical prescription at the office. 4. Medications other than antiinflammatories will not be called into the pharmacy after business hours. 5. Do not take Tylenol/Acetaminophen in addition to your pain medication as most pain medications already contain this ingredient. Do not exceed 4000mg of Tylenol/Acetaminophen per day. 6. You may resume the medication(s) you were taking prior to your surgery. Narcotics may change the effects of some antidepressant medication(s). If you have any questions about possible interactions between your regular medications and the pain medication, you should ask the pharmacist or contact the prescribing physician. 7. You will be discharged with prescriptions for additional pain medications (Tramadol or Toradol) and a medication for nausea and vomiting (Phenergan). You only need to fill these prescriptions if the primary pain medication is not working or you experiencing post-op nausea. 8. If you have constipation which is not improved by oral stool softeners then a Ducolax suppository should be purchased over the counter. 9. Continue the blood thinner (Aspirin or Lovenox) for a total of 30 days following surgery. Follow up appointment: 
 
Please call our office at [phone] for your follow up appointment. This should be scheduled 14 days following the date of surgery. Physical Therapy / Nursing: 
 
Physical Therapy following surgery will be arranged at home along with at home nursing care. They have specific instructions for rehab and wound care. .  
 
Returning to work: 
 
Normal return to work is 3-12 weeks following total hip replacement. Depending on your progression following surgery and specific job duties you may take longer for a full return to work. DRIVING You should not return to driving until you are off all narcotic pain medications and able to safely and quickly apply the brakes. This is normally 3-6 weeks for left hips and 4-8 weeks for right hip. Important Signs and Symptoms: 
 
If any of the following signs or symptoms occur, you should contact Dr. Avelina Graff office. Please be advised if a problem arises which you feel requires immediate medical attention or you are unable to contact Dr. Avelina Graff office you should seek immediate medical attention at the ER or other health care facility you have access to. 
 
1. A sudden increase in swelling and/or redness or warmth at the area your surgery was performed which isnt relieved by rest, ice, and elevation. 2. Oral temperature greater than 101 degrees for 12 hours or more which isnt relieved by an increase in fluid intake and taking 2 Tylenol every 4-6 hours. 3. Excessive drainage from your incisions, or drainage which hasnt stopped by 72 hours after your surgery. 4. Fever, chills, shortness of breath, chest pain, nausea, vomiting or other signs and symptoms which are of concern to you. frequently asked questions ? What should I take for pain? 
o In general you will be discharged with three medications for pain (Extra Strength Tylenol, Oxycodone 5mg and Tramadol). This may vary slightly depending on what you were taking in the hospital.  
? 1st Line  Extra Strength Tylenol 1-2 tablets (500-1000mg) every 4-6 hrs. ? After 2 days this dose should not exceed 8 tablets per day ? This is the first and only medication you need to take. Initially you may need 2 tablets every 4 hours, but as your pain subsides, this will taper to 1 tablet every 6 hours. ? 2nd Line - Tramadol 50mg (1 tablet) every 8 hours ? 3rd Line  Oxycodone 1 (5mg) - 2 (10mg) every 4 hours (Or as directed), take these between Percocet doses if your pain is not below 4 / 10. This may be needed only for several days following your discharge. ? 4th Line  Add Alleve 220mg every 12 hours or Motrin 400mg (200mg x 2) every 8 hours ? When should I call for advice regarding my pain? 
o After 12 hours on the above regimen, if nothing is working call the office [phone] orj [phone] or [phone]) or call my cell after hours [phone]. 
? Can I get refills? 
o Narcotic refills are provided for the first 6 weeks following surgery. ? I will generally try to taper down to a single narcotic medication by your two week appointment. o Try Tylenol 650mg along with Alleve 220mg or Motrin 200mg during the majority of the day. ? Save the narcotic pain medications for physical therapy (1 hour prior) and before sleeping at night. ? Keep in mind you need to discontinue these medications prior to returning to driving. ? Is swelling normal? 
o Almost everyone has some degree of swelling following surgery. o Following hip and knee replacement surgery, swelling can be normal below the incision for the first 1-2 weeks. ? This swelling peaks around 5-7 days after surgery. ? You may have some bruising around the back of the thigh, calf and even into the foot. ? What should I do for the swelling? 
o Keep the limb elevated. o Apply compression socks (knee high for total knees and up to the mid-thigh for total hips.  
o Heat or ice may be applied, choose the modality that makes you the most comfortable. ? How long should I remain on blood thinners following surgery? o Thirty days ? When can I drive? 
o Once you have stopped using regular narcotic pain medications (Percocet, Lortab, etc.) and can safely apply the brakes without hesitation. ? When can I shower? o 72hours following surgery if the incision is dry. 
o No submersion of the incision, bathing or swimming for 14 days following surgery or until cleared by Dr Jered Olivarez. ? What do I do with the dressing when I shower? 
o The dressing can be removed. o The incision is sealed with Dermabond (Biologic glue) and except for wounds which are draining should be watertight. ? How active should I be following surgery? o Progress activities in moderation at your own pace.  
o Walk each day and set progressive goals with small increments (1st week  ½ block of walking, 2nd week  1 block, 3rd week  2 blocks, etc.) Please do not hesitate to call me at [phone] (cell phone) for questions following surgery - Jeanette Rm MD 
 
 
 
 
 
  
  
  
Introducing [facility]! Jennifer Porter introduces GoChime patient portal. Now you can access parts of your medical record, email your doctor's office, and request medication refills online. 1. In your internet browser, go to [email or web address] 2. Click on the First Time User? Click Here link in the Sign In box. You will see the New Member Sign Up page. 3. Enter your GoChime Access Code exactly as it appears below. You will not need to use this code after youve completed the sign-up process. If you do not sign up before the expiration date, you must request a new code. · GoChime Access Code: [email or web address] Expires: 4/4/2018 10:22 AM 
 
4. Enter the last four digits of your Social Security Number (xxxx) and Date of Birth (mm/dd/yyyy) as indicated and click Submit. You will be taken to the next sign-up page. 5. Create a GoChime ID. This will be your GoChime login ID and cannot be changed, so think of one that is secure and easy to remember. 6. Create a BeThere[employer]ards password. You can change your password at any time. 7. Enter your Password Reset Question and Answer. This can be used at a later time if you forget your password. 8. Enter your e-mail address. You will receive e-mail notification when new information is available in [address] 9. Click Sign Up. You can now view and download portions of your medical record. 10. Click the Download Summary menu link to download a portable copy of your medical information. If you have questions, please visit the Frequently Asked Questions section of the BeThere[employer] website. Remember, BeThereRewards is NOT to be used for urgent needs. For medical emergencies, dial 911. Now available from your iPhone and Android! Providers Seen During Your Hospitalization Provider Specialty Primary office phone Farrah Joy MD Orthopedic Surgery [phone] Your Primary Care Physician (PCP) Primary Care Physician Office Phone Office Fax Wes MORATAYA Sit [phone] You are allergic to the following Allergen Reactions Codeine Nausea and Vomiting Flu Vac 2015 (65 Up)-Mf59c(Pf) Nausea Only Fluvirin [phone] Other (comments) GI upset Naprosyn (Naproxen) Nausea and Vomiting Gas Recent Documentation Height Weight BMI Smoking Status 1.702 m 94.8 kg 32.73 kg/m2 Never Smoker Emergency Contacts Name Discharge Info Relation Home Work Mobile C/Casia 10 CAREGIVER [3] Daughter [21]   [phone] Patient Belongings The following personal items are in your possession at time of discharge: 
  Dental Appliances: Uppers  Visual Aid: Glasses, At bedside   Hearing Aids/Status: At home         Clothing: Footwear, Jacket/Coat, Pants, Shirt, Socks, Undergarments Please provide this summary of care documentation to your next provider. Signatures-by signing, you are acknowledging that this After Visit Summary has been reviewed with you and you have received a copy. Patient Signature:  ____________________________________________________________ Date:  ____________________________________________________________  
  
Lawerence Megan Provider Signature:  ____________________________________________________________ Date:  ____________________________________________________________

## 2018-01-15 NOTE — ANESTHESIA PREPROCEDURE EVALUATION
Anesthetic History   No history of anesthetic complications            Review of Systems / Medical History  Patient summary reviewed, nursing notes reviewed and pertinent labs reviewed    Pulmonary  Within defined limits                 Neuro/Psych         Psychiatric history    Comments: Anxiety/depression Cardiovascular              Hyperlipidemia         GI/Hepatic/Renal     GERD: well controlled    Renal disease: stones  Hiatal hernia     Endo/Other        Arthritis and cancer    Comments: Prostate cancer Other Findings              Physical Exam    Airway  Mallampati: II    Neck ROM: normal range of motion   Mouth opening: Normal     Cardiovascular    Rhythm: regular  Rate: normal         Dental    Dentition: Full upper dentures and Edentulous     Pulmonary  Breath sounds clear to auscultation               Abdominal         Other Findings            Anesthetic Plan    ASA: 2  Anesthesia type: spinal      Post-op pain plan if not by surgeon: peripheral nerve block continuous      Anesthetic plan and risks discussed with: Patient      Informed consent obtained.

## 2018-01-15 NOTE — H&P
Orthopaedic PRE-OP Admission History and Physical    Past Medical History:   Diagnosis Date    Anxiety state, unspecified     Arthritis     Cancer ([facility] [address] 2001    prostate,     Depression     GERD (gastroesophageal reflux disease)     Hiatal hernia     Hypercholesteremia     Renal calculus     Seasonal allergic rhinitis       Past Surgical History:   Procedure Laterality Date    ENDOSCOPY, COLON, DIAGNOSTIC      HX APPENDECTOMY      HX CATARACT REMOVAL  2012    HX COLONOSCOPY      HX ENDOSCOPY      HX GI      colonoscopy    HX KNEE REPLACEMENT  02/18/14    right total knee    HX KNEE REPLACEMENT Left 10/2015    HX LITHOTRIPSY  11/2011    HX ORTHOPAEDIC  1992    rt. carpal tunnel    HX PROSTATECTOMY  2003    bowel incontinence since surgery    HX TONSILLECTOMY        Prior to Admission medications    Medication Sig Start Date End Date Taking? Authorizing Provider   busPIRone (BUSPAR) 10 mg tablet Take 10 mg by mouth daily. Yes Historical Provider   RABEprazole (ACIPHEX) 20 mg tablet Take 20 mg by mouth daily. Yes Historical Provider   montelukast (SINGULAIR) 10 mg tablet Take 10 mg by mouth every evening. Yes Historical Provider   HYDROcodone-acetaminophen (NORCO) 5-325 mg per tablet Take 1 Tab by mouth every eight (8) hours as needed for Pain. Yes Historical Provider   gabapentin (NEURONTIN) 300 mg capsule Take 300 mg by mouth three (3) times daily. Yes Historical Provider   ezetimibe (ZETIA) 10 mg tablet Take 10 mg by mouth daily. Yes Mouna Roberson MD   fluoxetine (PROZAC) 20 mg capsule Take 1 capsule by mouth daily. 9/22/64  Yes Lorrie Oconnell MD   simvastatin (ZOCOR) 40 mg tablet Take 1 tablet by mouth nightly. 2/67/62  Yes Lorrie Oconnell MD   lorazepam (ATIVAN) 0.5 mg tablet Take 1 tablet by mouth two (2) times daily as needed for Anxiety. 7/77/88  Yes Lorrie Oconnell MD   loratadine (CLARITIN) 10 mg tablet Take 1 Tab by mouth daily.  1/49/53  Yes Lorrie Oconnell MD cholecalciferol, vitamin D3, (VITAMIN D3) 2,000 unit Tab Take 1 Tab by mouth daily. 9/4/59  Yes Manuel Penaloza MD   metoclopramide HCl (REGLAN) 10 mg tablet Take 1 Tab by mouth Before breakfast, lunch, dinner and at bedtime. 9/0/50  Yes Manuel Penaloza MD     Current Facility-Administered Medications   Medication Dose Route Frequency    acetaminophen (TYLENOL) tablet 975 mg  975 mg Oral ONCE    fentaNYL citrate (PF) injection 25 mcg  25 mcg IntraVENous Q5MIN PRN    oxyCODONE IR (ROXICODONE) tablet 5 mg  5 mg Oral ONCE PRN    ketorolac (TORADOL) injection 15 mg  15 mg IntraVENous ONCE PRN    celecoxib (CELEBREX) capsule 100 mg  100 mg Oral ONCE    lactated Ringers infusion  125 mL/hr IntraVENous CONTINUOUS    sodium chloride (NS) flush 5-10 mL  5-10 mL IntraVENous Q8H    sodium chloride (NS) flush 5-10 mL  5-10 mL IntraVENous PRN    lidocaine (PF) (XYLOCAINE) 10 mg/mL (1 %) injection 0.1 mL  0.1 mL SubCUTAneous PRN    ceFAZolin in 0.9% NS (ANCEF) IVPB soln 2 g  2 g IntraVENous ONCE      Allergies   Allergen Reactions    Codeine Nausea and Vomiting    Flu Vac 2015 (65 Up)-Mf59c(Pf) Nausea Only    Fluvirin 9529-7531 Other (comments)     GI upset    Naprosyn [Naproxen] Nausea and Vomiting     Gas          Review of Systems  Review of systems was documented in PAT and also in the HPI. Physical Exam  Gen: No acute distress   Resp: No accessory muscle use, no acute distress, conversant without gasping, clear lung fields. Card: No abnormalities detected, RRR- See PAT exam if available. Abd: Soft, non-tender, non-distended  Lymph: No palpable lymph nodes of the affected extremity  Skin: No skin breakdown noted. Labs: No results for input(s): WBC, HGB, HCT, K, CREA, GLU, CRP, HGBEXT, HCTEXT in the last 72 hours.     No lab exists for component: ESR    [facility] Note - Subjective / Exam / Imaging / Plan                                CHIEF COMPLAINT:  RIght Hip Pain / Disability  HISTORY OF PRESENT ILLNESS:  Mr. Tafoya Males reports persistent hip pain which is localized to the anterior and lateral aspect of the hip and he describes the intensity as severe. The patient states that the symptoms are interfering with exercise and/or daily activities. Previous and/or current treatment includes anti-inflammatories, PT. Has never had any hip injections. Was last seen in 2017--decision made at that time to proceed with right total hip arthroplasty. He states that he is back in clinic today for a pre-operative visit. Scheduled for surgery on 18. REVIEW OF SYSTEMS & MEDICATIONS:  Review of Systems   2018  Constitutional: Unexplained: Negative  Genitourinary: Frequent Urination: Negative  HEENT: Vision Loss: Negative  Neurological: Memory Loss: Negative  Integumentary: Rash: Negative  Cardiovascular: Palpatations: Negative  Hematologic: Bruises/Bleeds Easily: Negative  Gastrointestinal: Constipation: Negative  Immunological: Seasonal Allergies: Negative  Musculoskeletal: Joint Pain: Positive     MEDICATIONS   has a current medication list which includes the following prescription(s): buspirone, fluoxetine, gabapentin, lorazepam, montelukast, mupirocin, rabeprazole, simvastatin, tramadol, vitamin d (ergocalciferol), and zetia.   PHYSICAL EXAM :  HIP EXAMINATION :     Range of motion: 90/0/20 (Hip Flexion / Internal Rotation / External Rotation)    Stinchfield testing: positive    Impingement testing: positive    Palpation of the greater trochanter: no pain    Leg Lengths : Equal    Gait evaluation: not assessed, the patient was seen and examined in a wheelchair, unstable to stand    Ambulatory Aids : Seen in a wheelchair    Incision: no previous incision or noteable skin irregularities    RADIOGRAPHIC EVALUATION :  X-ray Chest 2 Views ([id number])     Result Date: 2018  [facility] - XR CHEST PA LAT Patient: Harmony Long : 1935 Date of Service: 2018 12:15:53 PM Reason For Exam: Ordering Provider: Deborah Keys Physician: Forest Signs Signing Date: 1/4/2018 12:18:35 PM Indication:  preop Exam: PA and lateral views of the chest. Direct comparison is made to prior CXR dated October 2015. Findings: Cardiomediastinal silhouette is within normal limits. Stable linear scarring within the right midlung. Lungs are otherwise clear bilaterally. Pleural spaces are normal. Osseous structures are intact. IMPRESSION: No acute cardiopulmonary disease. ASSESSMENT:  1.1. RIght Hip Severe OA, uses a wheelchair, but can walk  PLAN  Medical Decision Making and Discussion: We discussed the treatment options which include proceeding with MARYJANE. Limited walking, WC for 2 months. All of the patients questions were answered. .   Therapy recommendations: No physical therapy was ordered during this visit   Medications this Visit: No medications were prescribed today  Medical Concerns or Issues: The patient is healthy, and did not report medical concerns or dental issues. Follow-up: The patient should be seen for primary care clearance, joint class at [facility] and Pre-admission Testing. They were issued a packet and counseled for surgery.      SURGICAL COUNSELING - ARTHROPLASTY  SURGICAL Plan : Right Total Hip Arthroplasty - Modified Direct Anterior      SURGICAL CONSIDERATIONS : no special surgical consideration     POST-OP / IN HOSPITAL CONSIDERATIONS : No special considerations following surgery     SOCIAL AND HOME CONSIDERATIONS : No post-operative social concerns, the patient has family or friends who will care for them post-op.     EXPECTATIONS FOLLOWING SURGERY : Walking activities and low demand activities      I have discussed the planned surgery with the patient in detail and a joint decision was made to proceed with joint arthroplasty or revision. Conservative measures have been exhausted prior to the decision for arthroplasty.  We have discussed the risks, alternatives, and benefits of the surgery. Risks of surgery include infection, bleeding, damage to neurovascular structures, the need for further surgery, and the risk associated with anesthesia. These include heart attack, stroke, deep vein thrombosis formation, pulmonary embolism and even death. We have also discussed bone or implant failure following surgery and the further interventions if necessary. Specific arthroplasty risks include fracture around the prosthesis, deep infection, limited range of motion, and possible dislocation of the prosthesis.     We also had a lengthy discussion regarding total joint replacement, and longevity of the implants. The patient was not given a guarantee of a successful outcome. I discussed expectations prior to the surgery, the need for rehabilitation following surgery and post-operative expectations. A packet was given to the patient to include the date of surgery, testing prior to the surgery, and postoperative follow-up to include physical therapy. The appropriate pre-operative clearance will be obtained prior to surgery. Surgical Counseling   After a thorough discussion we will proceed with surgical intervention without contraindications. I discussed surgical indications and alternatives with the patient. Risks including infection, bleeding, damage to other structures, need for further surgery and the risks of anesthesia (DVT, PE, Stroke, Heart Attack and Death) have been discussed with the patient. Verbal and written consent were obtained.      Pineda Talbot MD  Cell [phone]  [facility]  [phone]  Medical Staff : Jasmyn Mckee / [facility]  Office : [phone] ext  [phone]

## 2018-01-15 NOTE — PROGRESS NOTES
Physical Therapy:  Orders received and chart reviewed. Patient currently with absent motor and sensory function in bilateral LE due to spinal block for surgery. We will continue to follow.   Thank you  Vita Ko PT,DPT,NCS

## 2018-01-15 NOTE — PERIOP NOTES
TRANSFER - OUT REPORT:    Verbal report given to  Dmitriy Donohue RN (name) on Kelsy Dumont  being transferred to  Room 426 (unit) for routine progression of care       Report consisted of patients Situation, Background, Assessment and   Recommendations(SBAR). Information from the following report(s) SBAR was reviewed with the receiving nurse. Lines:   Peripheral IV 01/15/18 Right Hand (Active)   Site Assessment Clean, dry, & intact 1/15/2018  2:21 PM   Phlebitis Assessment 0 1/15/2018  2:21 PM   Infiltration Assessment 0 1/15/2018  2:21 PM   Dressing Status Clean, dry, & intact 1/15/2018  1:37 PM   Dressing Type Transparent 1/15/2018  1:37 PM   Hub Color/Line Status Green 1/15/2018  1:37 PM   Action Taken Open ports on tubing capped 1/15/2018  1:37 PM        Opportunity for questions and clarification was provided. Patient transported with:   Registered Nurse  Bedside report given to [address] bedside visiting. Patient is pain free and on room air.

## 2018-01-15 NOTE — IP AVS SNAPSHOT
[address]  [address] [phone] Patient: Kennedy Carpio MRN: [id number] A check pablo indicates which time of day the medication should be taken. My Medications START taking these medications Instructions Each Dose to Equal  
 Morning Noon Evening Bedtime  
 acetaminophen 500 mg tablet Commonly known as:  80 Jr Moncho Fontenot  Your last dose was:  1/18/18  At 11:20 Take 1-2 Tabs by mouth every six (6) hours as needed for Pain. Not to exceed 4,000mg in any 24 hour period  Indications: Pain 500-1000 mg  
    
   
   
   
  
 aspirin delayed-release 325 mg tablet Your last dose was:  09:03 Your next dose is: This Evening Take 1 Tab by mouth two (2) times a day. 325 mg  
    
   
   
   
  
 ondansetron 8 mg disintegrating tablet Commonly known as:  ZOFRAN ODT Take 0.5 Tabs by mouth every eight (8) hours as needed for Nausea. 4 mg  
    
   
   
   
  
 oxyCODONE IR 5 mg immediate release tablet Commonly known as:  [place] Your last dose was:  1/16/18 at 6:20 Take 1-2 Tabs by mouth every four (4) hours as needed for Pain. Max Daily Amount: 60 mg. Indications: Pain 5-10 mg  
    
   
   
   
  
 traMADol 50 mg tablet Commonly known as:  ULTRAM  
   
 Take 1 Tab by mouth every six (6) hours as needed for Pain (Take for breakthrough pain if Oxycodone is not working). Max Daily Amount: 200 mg. Indications: Pain, Post-op Pain, Diagnosis Hip and Knee Arthritis ICD 10 - M16.9  
 50 mg CONTINUE taking these medications Instructions Each Dose to Equal  
 Morning Noon Evening Bedtime  
 busPIRone 10 mg tablet Commonly known as:  BUSPAR Your last dose was:  09:03 Take 10 mg by mouth daily. 10 mg  
    
   
   
   
  
 cholecalciferol (vitamin D3) 2,000 unit Tab Commonly known as:  VITAMIN D3  
   
 Take 1 Tab by mouth daily. 1 Tab CLARITIN 10 mg tablet Generic drug:  loratadine Your last dose was:  09:03 Take 1 Tab by mouth daily. 10 mg FLUoxetine 20 mg capsule Commonly known as:  PROzac Your last dose was:  09:03 Take 1 capsule by mouth daily. 20 mg  
    
   
   
   
  
 gabapentin 300 mg capsule Commonly known as:  NEURONTIN Your last dose was:  09:03 Take 300 mg by mouth three (3) times daily. 300 mg LORazepam 0.5 mg tablet Commonly known as:  ATIVAN Take 1 tablet by mouth two (2) times daily as needed for Anxiety. 0.5 mg  
    
   
   
   
  
 metoclopramide HCl 10 mg tablet Commonly known as:  REGLAN Your next dose is:  Afternoon and Evening Take 1 Tab by mouth Before breakfast, lunch, dinner and at bedtime. 10 mg  
    
   
   
   
  
 montelukast 10 mg tablet Commonly known as:  SINGULAIR Your last dose was:  1/17/18 at 6:00 PM  
Your next dose is: This evening Take 10 mg by mouth every evening. 10 mg  
    
   
   
   
  
 RABEprazole 20 mg tablet Commonly known as:  ACIPHEX Take 20 mg by mouth daily. 20 mg  
    
   
   
   
  
 simvastatin 40 mg tablet Commonly known as:  ZOCOR Your last dose was:  1/17/18 at 10:30 Your next dose is: This Evening Take 1 tablet by mouth nightly. 40 mg  
    
   
   
   
  
 ZETIA 10 mg tablet Generic drug:  ezetimibe Your last dose was:  1/18/18   09:03 Take 10 mg by mouth daily. 10 mg  
    
   
   
   
  
  
STOP taking these medications HYDROcodone-acetaminophen 5-325 mg per tablet Commonly known as:  Solange Finch Where to Get Your Medications Information on where to get these meds will be given to you by the nurse or doctor. ! Ask your nurse or doctor about these medications  
  acetaminophen 500 mg tablet aspirin delayed-release 325 mg tablet  
 ondansetron 8 mg disintegrating tablet  
 oxyCODONE IR 5 mg immediate release tablet  
 traMADol 50 mg tablet

## 2018-01-15 NOTE — OP NOTES
OPERATIVE REPORT     Admit Date: 1/15/2018  Admit Diagnosis: OSTEOARTHRITIS RIGHT HIP  S/P total hip arthroplasty  Preoperative Diagnosis: OSTEOARTHRITIS RIGHT HIP  Postoperative Diagnosis: OSTEOARTHRITIS RIGHT HIP    Procedure: Procedure(s):  RIGHT TOTAL HIP REPLACEMENT DIRECT ANTERIOR  Surgeon: Uyen Holliday MD  Assistant(s): Ena Avalos PA-C  Anesthesia: Regional   Estimated Blood Loss: 150cc  Specimens: * No specimens in log *   Complications: None        INDICATIONS:    The patient is a 81 y.o., male who has complained of a long history of right hip pain / groin pain. The patient has failed conservative treatment to include medical management / therapy and presents for definitive operative care. Informed consent was obtained including a discussion of the risks and benefits, which include, but are not limited to, bleeding, infection, neurovascular damage, wound complications, pain and stiffness in the hip, periprosthetic loosening, fracture, dislocation and venous thrombo-embolic disease, the patient consented for the procedure. DESCRIPTION OF PROCEDURE:       The proper side and hip were identified and signed in the preoperative holding area. All questions were answered. After adequate anesthesia, the patient was transferred to the hana table and all bony prominence were well padded. The hip was prepped and draped in sterile fashion. The patient was positioned supine on the operating room table. A direct anterior approach was used through skin and the tensor fascia. The interval between the Tensor Fascia and Sartorius was used and retractors were placed in an atraumatic fashion. The lateral femoral circumflex artery and vein were identified and coagulated. The proximal and distal capsule was identified and the anterior capsule excised. A standard neck cut was made according to the implant geometry along with a separate cut just below the articular surface to create a small napkin ring.  The bone was removed along with the femoral head. Further soft tissue was debrided. Acetabular exposure was then obtained and the labrum was excised along with the foveal contents. Reaming in an anatomic position was then performed starting at 45mm reamer up to the final reamer size. Osteophytes were removed at this point. The acetabulum was copiously irrigated and then the final cup was impacted in place. A liner for the appropriate head size was also impacted in place. Stability of the cup and liner were assessed. The femur was then exposed, residual soft tissue was removed and the box osteotome was used along with the entry reamer to open the canal. The limb was appropriately positioned on the hana table Sequential broaching was started with the zero broach and broached up to the final implant size. Fluoroscopy was used at this time to verify cup inclination, version and the femoral broach size as well as leg lengths. The final implant placed. A final trial was performed to assess leg length and verified fluoroscopically. The final femoral head was then impacted in place. The wound was copiously irrigated and the final stem was placed along with the head which was impacted in place. Stability and leg lengths were assessed again and verified fluroscopically. The final implants were irrigated. The interval between the tensor and Sartorius was closed with 0-Vicryl, the sub-Q with 2-0 Vicryl, 4-0 monocryl and dermabond. A sterile dressing was applied. The patient was awoken from anesthesia and taken to the recovery room in a stable condiition. OPERATIVE FINDINGS : Severe OA of the Right hip. IMPLANTS :     Implant Name Type Inv.  Item Serial No.  Lot No. LRB No. Used Action   SHELL TRITAN GAB CLSTR H 56MM --  - SNA  SHELL TRITAN GAB CLSTR H 56MM --  NA [employer] HOWM [id number] Right 1 Implanted   INSERT ACET 0DEG 36MM E X3 -- TRIDENT - SNA  INSERT ACET 0DEG 36MM E X3 -- TRIDENT NA [employer] HOWM [id number] Right 1 Implanted   STEM FEM SZ 6 132D [id number] -- ACCOLADE II V40 - SNA  STEM FEM SZ 6 132D [id number] -- ACCOLADE II V40 NA [employer] HOWM [id number] Right 1 Implanted   HEAD FEM DELT V40 -2.5MM 36MM -- V40 BIOLOX - SNA   HEAD FEM DELT V40 -2.5MM 36MM -- V40 BIOLOX NA [employer] HOWM [id number] Right 1 Implanted       JUSTIFICATION FOR SURGICAL ASSISTANT:   Surgical Assistant, was requried and necessary in this case, to help with soft tissue retraction, extremity positioning, equiment management, implant management, and wound closure.     Chance Ledezma MD

## 2018-01-16 LAB
ANION GAP SERPL CALC-SCNC: 7 MMOL/L (ref 5–15)
BUN SERPL-MCNC: 12 MG/DL (ref 6–20)
BUN/CREAT SERPL: 11 (ref 12–20)
CALCIUM SERPL-MCNC: 7.9 MG/DL (ref 8.5–10.1)
CHLORIDE SERPL-SCNC: 107 MMOL/L (ref 97–108)
CO2 SERPL-SCNC: 28 MMOL/L (ref 21–32)
CREAT SERPL-MCNC: 1.05 MG/DL (ref 0.7–1.3)
GLUCOSE SERPL-MCNC: 86 MG/DL (ref 65–100)
HGB BLD-MCNC: 9.8 G/DL (ref 12.1–17)
POTASSIUM SERPL-SCNC: 3.9 MMOL/L (ref 3.5–5.1)
SODIUM SERPL-SCNC: 142 MMOL/L (ref 136–145)

## 2018-01-16 PROCEDURE — 80048 BASIC METABOLIC PNL TOTAL CA: CPT | Performed by: PHYSICIAN ASSISTANT

## 2018-01-16 PROCEDURE — 97116 GAIT TRAINING THERAPY: CPT

## 2018-01-16 PROCEDURE — 77010033678 HC OXYGEN DAILY

## 2018-01-16 PROCEDURE — 85018 HEMOGLOBIN: CPT | Performed by: PHYSICIAN ASSISTANT

## 2018-01-16 PROCEDURE — 36415 COLL VENOUS BLD VENIPUNCTURE: CPT | Performed by: PHYSICIAN ASSISTANT

## 2018-01-16 PROCEDURE — 74011250636 HC RX REV CODE- 250/636: Performed by: PHYSICIAN ASSISTANT

## 2018-01-16 PROCEDURE — 97165 OT EVAL LOW COMPLEX 30 MIN: CPT

## 2018-01-16 PROCEDURE — 77030027138 HC INCENT SPIROMETER -A

## 2018-01-16 PROCEDURE — 97161 PT EVAL LOW COMPLEX 20 MIN: CPT

## 2018-01-16 PROCEDURE — 97530 THERAPEUTIC ACTIVITIES: CPT

## 2018-01-16 PROCEDURE — 97110 THERAPEUTIC EXERCISES: CPT

## 2018-01-16 PROCEDURE — 97535 SELF CARE MNGMENT TRAINING: CPT

## 2018-01-16 PROCEDURE — 74011250637 HC RX REV CODE- 250/637: Performed by: PHYSICIAN ASSISTANT

## 2018-01-16 PROCEDURE — 65270000029 HC RM PRIVATE

## 2018-01-16 RX ADMIN — Medication 10 ML: at 22:00

## 2018-01-16 RX ADMIN — METOCLOPRAMIDE HYDROCHLORIDE 10 MG: 10 TABLET ORAL at 11:39

## 2018-01-16 RX ADMIN — EZETIMIBE 10 MG: 10 TABLET ORAL at 08:34

## 2018-01-16 RX ADMIN — GABAPENTIN 300 MG: 300 CAPSULE ORAL at 08:35

## 2018-01-16 RX ADMIN — FLUOXETINE 20 MG: 20 CAPSULE ORAL at 08:34

## 2018-01-16 RX ADMIN — FAMOTIDINE 20 MG: 20 TABLET, FILM COATED ORAL at 18:21

## 2018-01-16 RX ADMIN — METOCLOPRAMIDE HYDROCHLORIDE 10 MG: 10 TABLET ORAL at 06:03

## 2018-01-16 RX ADMIN — METOCLOPRAMIDE HYDROCHLORIDE 10 MG: 10 TABLET ORAL at 22:51

## 2018-01-16 RX ADMIN — CELECOXIB 200 MG: 100 CAPSULE ORAL at 08:34

## 2018-01-16 RX ADMIN — PANTOPRAZOLE SODIUM 40 MG: 40 TABLET, DELAYED RELEASE ORAL at 08:34

## 2018-01-16 RX ADMIN — SIMVASTATIN 40 MG: 20 TABLET, FILM COATED ORAL at 22:51

## 2018-01-16 RX ADMIN — METOCLOPRAMIDE HYDROCHLORIDE 10 MG: 10 TABLET ORAL at 16:42

## 2018-01-16 RX ADMIN — CELECOXIB 200 MG: 100 CAPSULE ORAL at 18:21

## 2018-01-16 RX ADMIN — ASPIRIN 325 MG: 325 TABLET, DELAYED RELEASE ORAL at 18:21

## 2018-01-16 RX ADMIN — CEFAZOLIN 2 G: 1 INJECTION, POWDER, FOR SOLUTION INTRAMUSCULAR; INTRAVENOUS; PARENTERAL at 01:36

## 2018-01-16 RX ADMIN — ASPIRIN 325 MG: 325 TABLET, DELAYED RELEASE ORAL at 08:34

## 2018-01-16 RX ADMIN — GABAPENTIN 300 MG: 300 CAPSULE ORAL at 16:42

## 2018-01-16 RX ADMIN — GABAPENTIN 300 MG: 300 CAPSULE ORAL at 22:51

## 2018-01-16 RX ADMIN — OXYCODONE HYDROCHLORIDE 5 MG: 5 TABLET ORAL at 08:35

## 2018-01-16 RX ADMIN — DOCUSATE SODIUM AND SENNOSIDES 1 TABLET: 8.6; 5 TABLET, FILM COATED ORAL at 18:20

## 2018-01-16 RX ADMIN — CEFAZOLIN 2 G: 1 INJECTION, POWDER, FOR SOLUTION INTRAMUSCULAR; INTRAVENOUS; PARENTERAL at 08:34

## 2018-01-16 RX ADMIN — ACETAMINOPHEN 650 MG: 325 TABLET ORAL at 18:20

## 2018-01-16 RX ADMIN — Medication 10 ML: at 11:39

## 2018-01-16 RX ADMIN — BUSPIRONE HYDROCHLORIDE 10 MG: 5 TABLET ORAL at 08:34

## 2018-01-16 RX ADMIN — ACETAMINOPHEN 650 MG: 325 TABLET ORAL at 11:39

## 2018-01-16 RX ADMIN — FAMOTIDINE 20 MG: 20 TABLET, FILM COATED ORAL at 08:34

## 2018-01-16 RX ADMIN — DOCUSATE SODIUM AND SENNOSIDES 1 TABLET: 8.6; 5 TABLET, FILM COATED ORAL at 08:34

## 2018-01-16 RX ADMIN — ACETAMINOPHEN 650 MG: 325 TABLET ORAL at 06:03

## 2018-01-16 RX ADMIN — OXYCODONE HYDROCHLORIDE 5 MG: 5 TABLET ORAL at 18:20

## 2018-01-16 RX ADMIN — MONTELUKAST SODIUM 10 MG: 10 TABLET, FILM COATED ORAL at 18:21

## 2018-01-16 RX ADMIN — LORATADINE 10 MG: 10 TABLET ORAL at 08:35

## 2018-01-16 RX ADMIN — POLYETHYLENE GLYCOL 3350 17 G: 17 POWDER, FOR SOLUTION ORAL at 08:33

## 2018-01-16 NOTE — PROGRESS NOTES
NUTRITION    RECOMMENDATIONS:     1. Encourage to order a protein item Q meal    ASSESSMENT:   1/16/18: Consult received for dietary supplements. Pt is POD # 1 R total hip replacement direct anterior. Visited pt who reports great appetite in hospital and prior to admission. States he eats well with a variety of foods. Discussed importance of protein for healing and encouraged a to eat a protein source at each meal.  RD also to ordered Ensure High Protein at PM snack. He denies any weight changes. RD provided menu to patient. Past Medical History:   Diagnosis Date    Anxiety state, unspecified     Arthritis     Cancer ([address] 2001    prostate,     Depression     GERD (gastroesophageal reflux disease)     Hiatal hernia     Hypercholesteremia     Renal calculus     Seasonal allergic rhinitis        Diet: regular    Abd: soft, passing flatus            BM: 1/14    Skin Integrity: []Intact  [x]Other: R hip incision  Edema: []None [x]Other:generalized    Nutritionally Significant Medications: [x] Reviewed    Labs:    Lab Results   Component Value Date/Time    Sodium 142 01/16/2018 05:21 AM    Potassium 3.9 01/16/2018 05:21 AM    Chloride 107 01/16/2018 05:21 AM    CO2 28 01/16/2018 05:21 AM    Anion gap 7 01/16/2018 05:21 AM    Glucose 86 01/16/2018 05:21 AM    BUN 12 01/16/2018 05:21 AM    Creatinine 1.05 01/16/2018 05:21 AM    Calcium 7.9 01/16/2018 05:21 AM    Magnesium 1.6 10/21/2015 03:00 AM    Phosphorus 3.8 10/21/2015 03:00 AM    Albumin 3.3 01/04/2018 11:28 AM       Anthropometrics:      Height: 5' 7\" (170.2 cm),    Body mass index is 32.73 kg/(m^2). IBW : 69.9 kg (154 lb), % IBW (Calculated): 135.71 %, Usual Body Weight: 94.3 kg (208 lb),    Wt Readings from Last 5 Encounters:   01/16/18 94.8 kg (208 lb 15.9 oz)   01/04/18 99.7 kg (219 lb 12.8 oz)   10/20/15 98.9 kg (218 lb)   10/13/15 98.9 kg (218 lb)   09/17/14 97.5 kg (215 lb)       Estimated Daily Nutrition Requirements:   Weight Used:  Other (comment) (wt from 1/15/18 & stated weight)  Kcals: 2300 Kcals/day Based on:[address] [address] [place] (x 1.3 x 1.1)  Protein: 94 g (to 105 gms ( 1-1.1 gms/kg))   Fluid: 2375 ml      Education & Discharge Needs:   [] Pt discussed in ID rounds     Nutrition related discharge needs addressed:     [] Supplements (on d/c instruction &/or coupons provided)    [] Tube Feedings     [] Education    [x]No nutrition related discharge needs at this time     Cultural, Sabianist and ethnic food preferences identified    [x] None   [] Yes     NUTRITION DIAGNOSIS:     Increased nutrient needs related to healing   as evidenced by S/p R total hip replacement                     Pt is at Nutrition Risk:  [x]    No Nutrition Risk Identified:  []    RD INTERVENTION / PT GOALS:     Food/Nutrient Delivery:   , Supplements: Commercial supplement (Ensure High Protien pm snack),  ,  ,    Nutrition Education: ,    Nutrition Counseling:    Coordination of Care:      Goal: Pt will consume a protein source Q meal and Ensure High Protein once daily prior to discharge    MONITORING & EVALUATION:   Food/Nutrient Intake Outcomes:  Total energy intake, Protein intake          Previous Nutrition Goals:  Previous Goal Met: N/A  Previous Recommendations:      Previous Recommendations Implemented: N/A       Nehal Ramos RD

## 2018-01-16 NOTE — PROGRESS NOTES
Problem: Mobility Impaired (Adult and Pediatric)  Goal: *Acute Goals and Plan of Care (Insert Text)  Physical Therapy Goals  Initiated 1/16/2018    1. Patient will move from supine to sit and sit to supine  in bed with modified independence within 4 days. 2. Patient will perform sit to stand with modified independence within 4 days. 3. Patient will ambulate with supervision/set-up for 50 feet with the least restrictive device within 4 days. 4. Patient will ascend/descend ramp with 0 handrail(s) with supervision/set-up within 4 days. 5. Patient will verbalize and demonstrate understanding of Anterior MARYJANE precautions per protocol within 4 days. 6. Patient will perform Anterior hip home exercise program per protocol with modified independence within 4 days. physical Therapy EVALUATION  Patient: Radha Ram (75 y.o. male)  Date: 1/16/2018  Primary Diagnosis: OSTEOARTHRITIS RIGHT HIP  S/P total hip arthroplasty  Primary osteoarthritis of right hip  Procedure(s) (LRB):  RIGHT TOTAL HIP REPLACEMENT DIRECT ANTERIOR (Right) 1 Day Post-Op   Precautions:   WBAT, Fall, Total hip (anterior)    ASSESSMENT :  Based on the objective data described below, the patient presents with decreased strength, ROM, balance following admission for elective Right MARYJANE. Patient prior to admission lives with his daughter in a 1 story home with a ramp to enter. He normally ambulates with a rollator and only ambulates household distances, he uses a wheelchair for community distances. He was educated on anterior hip precautions, he verbalized understanding, and handout given. He today requires MAX A for bed mobility, MOD A x2 for transfers with increased loss of balance requiring steadying. Patient able to ambulate short distance with RW to sit up in chair. Patient may require extra PT days due to slow progress, increased assistance levels and advanced age (81) for him to be safe to discharge home.  Recommend RW for ambulation due to high instability with current assistive device of rollator. Patient will benefit from skilled intervention to address the above impairments. Patients rehabilitation potential is considered to be Fair  Factors which may influence rehabilitation potential include:   []         None noted  []         Mental ability/status  []         Medical condition  []         Home/family situation and support systems  []         Safety awareness  []         Pain tolerance/management  [x]         Other: advanced age     PLAN :  Recommendations and Planned Interventions:  [x]           Bed Mobility Training             [x]    Neuromuscular Re-Education  [x]           Transfer Training                   []    Orthotic/Prosthetic Training  [x]           Gait Training                         []    Modalities  [x]           Therapeutic Exercises           []    Edema Management/Control  [x]           Therapeutic Activities            [x]    Patient and Family Training/Education  []           Other (comment):    Frequency/Duration: Patient will be followed by physical therapy  twice daily to address goals. Discharge Recommendations: Home Health  Further Equipment Recommendations for Discharge: RW     SUBJECTIVE:   Patient stated ok.     OBJECTIVE DATA SUMMARY:   HISTORY:    Past Medical History:   Diagnosis Date    Anxiety state, unspecified     Arthritis     [facility]    prostate,     Depression     GERD (gastroesophageal reflux disease)     Hiatal hernia     Hypercholesteremia     Renal calculus     Seasonal allergic rhinitis      Past Surgical History:   Procedure Laterality Date    ENDOSCOPY, COLON, DIAGNOSTIC      HX APPENDECTOMY      HX CATARACT REMOVAL  2012    HX COLONOSCOPY      HX ENDOSCOPY      HX GI      colonoscopy    HX KNEE REPLACEMENT  02/18/14    right total knee    HX KNEE REPLACEMENT Left 10/2015    HX LITHOTRIPSY  11/2011    HX ORTHOPAEDIC  1992    rt. carpal tunnel    HX PROSTATECTOMY  2003    bowel incontinence since surgery    HX TONSILLECTOMY       Prior Level of Function/Home Situation: see above  Personal factors and/or comorbidities impacting plan of care:     Home Situation  Home Environment: Private residence  # Steps to Enter: 0  Rails to Enter: No  Wheelchair Ramp: Yes  One/Two Story Residence: One story  Living Alone: No  Support Systems: Child(key) (daughter available 24/7)  Patient Expects to be Discharged to[de-identified] Private residence  Current DME Used/Available at Home: Wheelchair, [employer], rollator    EXAMINATION/PRESENTATION/DECISION MAKING:   Vitals  Vitals:    01/16/18 0758 01/16/18 0924 01/16/18 0936 01/16/18 0940   BP: 129/70 132/63 138/65 148/67   BP 1 Location: Left arm Left arm Left arm Left arm   BP Patient Position: At rest Supine; At rest Sitting Post activity   Pulse: 73 76 80 81   Resp: 15      Temp: 98.4 °F (36.9 °C)      SpO2: 96% 95% 95%    Weight:       Height:           Critical Behavior:  Neurologic State: Alert  Orientation Level: Oriented X4  Cognition: Appropriate decision making, Appropriate for age attention/concentration, Appropriate safety awareness, Follows commands     Hearing: Auditory  Auditory Impairment: Hard of hearing, bilateral  Hearing Aids/Status: At home  Skin:  All exposed intact  Edema: none noted  Range Of Motion:  AROM: Generally decreased, functional           PROM: Generally decreased, functional           Strength:    Strength: Generally decreased, functional                    Tone & Sensation:   Tone: Normal              Sensation: Intact               Coordination:  Coordination: Within functional limits  Vision:      Functional Mobility:  Bed Mobility:  Rolling: Moderate assistance  Supine to Sit: Maximum assistance        Transfers:  Sit to Stand:  Moderate assistance;Assist x2  Stand to Sit: Moderate assistance;Assist x2        Bed to Chair: Moderate assistance;Assist x2              Balance:   Sitting: Intact  Standing: Intact; With support  Ambulation/Gait Training:  Distance (ft): 5 Feet (ft)  Assistive Device: Walker, rolling;Gait belt  Ambulation - Level of Assistance: Moderate assistance;Assist x2     Gait Description (WDL): Exceptions to WDL  Gait Abnormalities: Step to gait                                       Stairs: Therapeutic Exercises:       Functional Measure:  Tinetti test:    Sitting Balance: 1  Arises: 0  Attempts to Rise: 0  Immediate Standing Balance: 0  Standing Balance: 1  Nudged: 0  Eyes Closed: 1  Turn 360 Degrees - Continuous/Discontinuous: 0  Turn 360 Degrees - Steady/Unsteady: 0  Sitting Down: 1  Balance Score: 4  Indication of Gait: 0  R Step Length/Height: 0  L Step Length/Height: 0  R Foot Clearance: 1  L Foot Clearance: 1  Step Symmetry: 0  Step Continuity: 0  Path: 1  Trunk: 0  Walking Time: 0  Gait Score: 3  Total Score: 7       Tinetti Test and G-code impairment scale:  Percentage of Impairment CH    0%   CI    1-19% CJ    20-39% CK    40-59% CL    60-79% CM    80-99% CN     100%   Tinetti  Score 0-28 28 23-27 17-22 12-16 6-11 1-5 0       Tinetti Tool Score Risk of Falls  <19 = High Fall Risk  19-24 = Moderate Fall Risk  25-28 = Low Fall Risk  Tinetti ME. Performance-Oriented Assessment of Mobility Problems in Elderly Patients. [facility] 66; [id number]. (Scoring Description: PT Bulletin Feb. 10, 1993)    Older adults: Coco Zelaya et al, 2009; n = 1000 [facility] elderly evaluated with ABC, IRENA, ADL, and IADL)  · Mean IRENA score for males aged 69-68 years = 26.21(3.40)  · Mean IRENA score for females age 69-68 years = 25.16(4.30)  · Mean IRENA score for males over 80 years = 23.29(6.02)  · Mean IRENA score for females over 80 years = 17.20(8.32)       G codes: In compliance with CMSs Claims Based Outcome Reporting, the following G-code set was chosen for this patient based on their primary functional limitation being treated:     The outcome measure chosen to determine the severity of the functional limitation was the Tinetti with a score of 7/28 which was correlated with the impairment scale. ? Mobility - Walking and Moving Around:     - CURRENT STATUS: CL - 60%-79% impaired, limited or restricted    - GOAL STATUS: CK - 40%-59% impaired, limited or restricted    - D/C STATUS:  ---------------To be determined---------------      Physical Therapy Evaluation Charge Determination   History Examination Presentation Decision-Making   HIGH Complexity :3+ comorbidities / personal factors will impact the outcome/ POC  HIGH Complexity : 4+ Standardized tests and measures addressing body structure, function, activity limitation and / or participation in recreation  LOW Complexity : Stable, uncomplicated  Other outcome measures Tinetti  MEDIUM      Based on the above components, the patient evaluation is determined to be of the following complexity level: LOW     Pain:                    Activity Tolerance:   Good- no complications and vitals stable throughout  Please refer to the flowsheet for vital signs taken during this treatment. After treatment:   [x]         Patient left in no apparent distress sitting up in chair  []         Patient left in no apparent distress in bed  [x]         Call bell left within reach  [x]         Nursing notified  []         Caregiver present  [x]         Chair alarm activated    COMMUNICATION/EDUCATION:   The patients plan of care was discussed with: Registered Nurse. [x]         Fall prevention education was provided and the patient/caregiver indicated understanding. [x]         Patient/family have participated as able in goal setting and plan of care. [x]         Patient/family agree to work toward stated goals and plan of care. []         Patient understands intent and goals of therapy, but is neutral about his/her participation. []         Patient is unable to participate in goal setting and plan of care.     Thank you for this referral.  Saintclair Ang, PT, DPT   Time Calculation: 25 mins

## 2018-01-16 NOTE — PROGRESS NOTES
Problem: Self Care Deficits Care Plan (Adult)  Goal: *Acute Goals and Plan of Care (Insert Text)  Occupational Therapy Goals  Initiated 1/16/2018     1. Patient will perform lower body dressing using Reacher, Stocking Aid and Long [employer]  PRN at supervision/set-up level within 7 days. 2. Patient will perform toilet transfers at supervision/set-up level using Walkers, Type: Rolling Walker  within 7 days. 3. Patient will perform all aspects of toileting at supervision/set-up level within 7 days. 4. Patient will demonstrate 3/3 hip precautions without cues within 7 days. 5. Patient will complete grooming standing at sink x 4 mins with supervision within 7 day(s). Occupational Therapy EVALUATION  Patient: Tyrell Brennan (24 y.o. male)  Date: 1/16/2018  Primary Diagnosis: OSTEOARTHRITIS RIGHT HIP  S/P total hip arthroplasty  Primary osteoarthritis of right hip  Procedure(s) (LRB):  RIGHT TOTAL HIP REPLACEMENT DIRECT ANTERIOR (Right) 1 Day Post-Op   Precautions:   WBAT, Fall, Total hip (anterior)    ASSESSMENT :  Based on the objective data described below, the patient presents with impaired standing balance, activity tolerance and functional mobility necessary in ADL tasks s/p R THR- anterior, POD 1. He lives at home with his daughter and reports he was mod indep with ADL tasks at rollator level PTA. He reports one fall approx 1 month prior with no injury. Verbalize 1/3 hip precautions, completed education with hand out for addition precautions. Provided transfer training with verbal cues for sequencing and safety at RW level chair > bed with mod AX 2 and one posterior LOB needing mod Ax 2 to correct. Completed grooming sitting EOB with set up and supervision to wash face. Returned to supine with max A. At this time he needs set up for grooming/UB care to total A for LB care. He may benefit from education on dressing aides next session.        Patient will benefit from skilled intervention to address the above impairments. Patients rehabilitation potential is considered to be Good  Factors which may influence rehabilitation potential include:   [x]                None noted  []                Mental ability/status  []                Medical condition  []                Home/family situation and support systems  []                Safety awareness  []                Pain tolerance/management  []                Other:      PLAN :  Recommendations and Planned Interventions:  [x]                  Self Care Training                  [x]           Therapeutic Activities  [x]                  Functional Mobility Training    []           Cognitive Retraining  [x]                  Therapeutic Exercises           [x]           Endurance Activities  [x]                  Balance Training                   []           Neuromuscular Re-Education  []                  Visual/Perceptual Training     [x]      Home Safety Training  [x]                  Patient Education                 [x]           Family Training/Education  []                  Other (comment):    Frequency/Duration: Patient will be followed by occupational therapy 5 times a week to address goals. Discharge Recommendations: refer to MD cerda plans- if home recommend HHOT with 24 hour supervision and assistance for ADL tasks   Further Equipment Recommendations for Discharge: rolling walker     SUBJECTIVE:   Patient stated The other lady told me not to sit up too long.     OBJECTIVE DATA SUMMARY:   HISTORY:   Past Medical History:   Diagnosis Date    Anxiety state, unspecified     Arthritis     [facility]    prostate,     Depression     GERD (gastroesophageal reflux disease)     Hiatal hernia     Hypercholesteremia     Renal calculus     Seasonal allergic rhinitis      Past Surgical History:   Procedure Laterality Date    ENDOSCOPY, COLON, DIAGNOSTIC      HX APPENDECTOMY      HX CATARACT REMOVAL  2012    HX COLONOSCOPY      HX ENDOSCOPY      HX GI      colonoscopy    HX KNEE REPLACEMENT  02/18/14    right total knee    HX KNEE REPLACEMENT Left 10/2015    HX LITHOTRIPSY  11/2011    HX ORTHOPAEDIC  1992    rt. carpal tunnel    HX PROSTATECTOMY  2003    bowel incontinence since surgery    HX TONSILLECTOMY         Prior Level of Function/Environment/Context: Home with daughter. He states he was mod indep with ADL tasks at rollator level. One recent falls    Home Situation  Home Environment: Private residence  # Steps to Enter: 0  Rails to Enter: No  Wheelchair Ramp: Yes  One/Two Story Residence: One story  Living Alone: No  Support Systems: Child(key) (daughter available 24/7)  Patient Expects to be Discharged to[de-identified] Private residence  Current DME Used/Available at Home: Ilir Estrin, rollator, Wheelchair, Grab bars  Tub or Shower Type: Shower  [x]  Right hand dominant   []  Left hand dominant    EXAMINATION OF PERFORMANCE DEFICITS:  Cognitive/Behavioral Status:  Neurologic State: Alert  Orientation Level: Oriented X4  Cognition: Appropriate decision making; Appropriate for age attention/concentration; Appropriate safety awareness; Follows commands       Skin: visible intact    Edema: uppers none    Hearing: Auditory  Auditory Impairment: Hard of hearing, bilateral  Hearing Aids/Status: At home    Vision/Perceptual:           Corrective Lenses: Glasses    Range of Motion:    AROM: BUE WDL             Strength:  Strength: Generally decreased, functional- BUE                Coordination:  Coordination: Within functional limits  Fine Motor Skills-Upper: Left Intact; Right Intact    Gross Motor Skills-Upper: Left Intact; Right Intact    Tone & Sensation:  Tone: Normal- BUE  Sensation: Intact                      Balance:  Sitting: Intact  Standing: Impaired  Standing - Static: Fair  Standing - Dynamic : Poor (posterior lean)    Functional Mobility and Transfers for ADLs:  Bed Mobility:  Rolling:  Moderate assistance  Supine to Sit: Maximum assistance  Sit to Supine: Maximum assistance  Scooting: Moderate assistance    Transfers:  Sit to Stand: Moderate assistance;Assist x2  Stand to Sit: Moderate assistance;Assist x2  Bed to Chair: Moderate assistance;Assist x2;Adaptive equipment    ADL Assessment:  Feeding: Setup    Oral Facial Hygiene/Grooming: Supervision (unsupported sitting)    Bathing: Maximum assistance    Upper Body Dressing: Setup;Supervision (unsupported sitting)    Lower Body Dressing: Total assistance    Toileting: Maximum assistance                ADL Intervention and task modifications:       Grooming  Washing Face: Supervision/set-up      Home safety: Patient instructed on home modifications and safety (raise height of ADL objects, appropriate height of chair surfaces, recliner safety, change of floor surfaces, clear pathways) to increase independence and fall prevention. Patient indicated understanding. Standing: Patient instructed and demonstrated to walk up to sink/counter top/surfaces, step into walker to increase safety of joint and fall prevention with Moderate Assistance and Assist x2. Patient educated about hip anatomy verbally and with pictures and educated to avoid rotation of RLE. Instructed to apply concept to ADLs within the home (no reaching across body, square off while using objects, slide objects along surfaces). Patient instructed to increase amount of time standing, observe standing position during ADLs in order to increase even weight bearing through bilateral LEs in order to increase independence with ADLs. Goal to be reached 30 days post - op, per orthopedic surgeon or per PT. Patient indicated understanding.       Functional Measure:  Barthel Index:    Bathin  Bladder: 5  Bowels: 10  Groomin  Dressin  Feedin  Mobility: 0  Stairs: 0  Toilet Use: 0  Transfer (Bed to Chair and Back): 5  Total: 35       Barthel and G-code impairment scale:  Percentage of impairment CH  0% CI  1-19% CJ  20-39% CK  40-59% CL  60-79% CM  80-99% CN  100%   Barthel Score 0-100 100 99-80 79-60 59-40 20-39 1-19   0   Barthel Score 0-20 20 17-19 13-16 9-12 5-8 1-4 0      The Barthel ADL Index: Guidelines  1. The index should be used as a record of what a patient does, not as a record of what a patient could do. 2. The main aim is to establish degree of independence from any help, physical or verbal, however minor and for whatever reason. 3. The need for supervision renders the patient not independent. 4. A patient's performance should be established using the best available evidence. Asking the patient, friends/relatives and nurses are the usual sources, but direct observation and common sense are also important. However direct testing is not needed. 5. Usually the patient's performance over the preceding 24-48 hours is important, but occasionally longer periods will be relevant. 6. Middle categories imply that the patient supplies over 50 per cent of the effort. 7. Use of aids to be independent is allowed. Vashti Narayanan., Barthel, D.W. (3037). Functional evaluation: the Barthel Index. [facility] (14)2. Garrett Zuleta, NAI.LEO, Ever Ro., Ariel Bowling., [facility], [address] [place] [address] (1999). Measuring the change indisability after inpatient rehabilitation; comparison of the responsiveness of the Barthel Index and Functional [place] Measure. Journal of Neurology, Neurosurgery, and Psychiatry, 66(4), 237-946. Chayito Caraballo, N.J.A, MARCEL Encarnacion, & Parrish Arredondo M.A. (2004.) Assessment of post-stroke quality of life in cost-effectiveness studies: The usefulness of the Barthel Index and the EuroQoL-5D. Quality of Life Research, 13, 254-38         G codes: In compliance with CMSs Claims Based Outcome Reporting, the following G-code set was chosen for this patient based on their primary functional limitation being treated:     The outcome measure chosen to determine the severity of the functional limitation was the Barthel INdex with a score of 35/100 which was correlated with the impairment scale. ? Self Care:     - CURRENT STATUS: CL - 60%-79% impaired, limited or restricted    - GOAL STATUS: CK - 40%-59% impaired, limited or restricted    - D/C STATUS:  ---------------To be determined---------------     Occupational Therapy Evaluation Charge Determination   History Examination Decision-Making   LOW Complexity : Brief history review  LOW Complexity : 1-3 performance deficits relating to physical, cognitive , or psychosocial skils that result in activity limitations and / or participation restrictions  LOW Complexity : No comorbidities that affect functional and no verbal or physical assistance needed to complete eval tasks       Based on the above components, the patient evaluation is determined to be of the following complexity level: LOW     Pain:  Reports 3-4/10           Activity Tolerance:   Limited standing tolerance with transfer secondary to weakness. Denies pain. After treatment:   [] Patient left in no apparent distress sitting up in chair  [x] Patient left in no apparent distress in bed  [x] Call bell left within reach  [x] Nursing notified  [] Caregiver present  [x] Bed alarm activated    COMMUNICATION/EDUCATION:   The patients plan of care was discussed with: Physical Therapist, Registered Nurse and Patient. [x]    Home safety education was provided and the patient/caregiver indicated understanding. [x]    Patient/family have participated as able in goal setting and plan of care. [x]    Patient/family agree to work toward stated goals and plan of care. []    Patient understands intent and goals of therapy, but is neutral about his/her participation. []    Patient is unable to participate in goal setting and plan of care. This patients plan of care is appropriate for delegation to [facility].     Thank you for this referral.  Bonita Dalton, OT  Time Calculation: 17 mins

## 2018-01-16 NOTE — PROGRESS NOTES
Bedside shift change report given to Barb Knox (oncoming nurse) by D[place] RN (offgoing nurse). Report included the following information SBAR, Kardex, Procedure Summary, Intake/Output, MAR and Recent Results.      Primary Nurse Chon Romano RN and Zahra De Leon RN performed a dual skin assessment on this patient Impairment noted- see wound doc flow sheet  - Surgical wound  Srikanth score is 20

## 2018-01-16 NOTE — PROGRESS NOTES
TOTAL HIP ARTHROPLASTY DAILY NOTE     ASSESSMENT / PLAN :   1. Pain Control : Excellent - Able to sleep and participate with therapy  2. Overnight Issues : Stable  3. Wound or incisional issue : Healing incision with no visible drainage  4. Therapy / Weight Bearing Recommendations : Weight bear as tolerated with use of a walker and two person assist while mobilizing  5. DVT Prophylaxis : Mechanical and Aspirin and mechanical lower extremity compression device  6. Disposition : Discharge to home with home health (maximum of 4 visits)  7. Medical Concerns : Stable  8. Comments : Stable       POD  1 Day Post-Op s/p Procedure(s):  RIGHT TOTAL HIP REPLACEMENT DIRECT ANTERIOR     SUBJECTIVE :     Concerns : Stable. OBJECTIVE :     Vitals:    01/15/18 1721 01/15/18 1914 01/15/18 2316 01/16/18 0402   BP: 138/64 113/61 129/72 130/71   Pulse: 78 77 70 67   Resp: 16 15 16 14   Temp: 98 °F (36.7 °C) 98.6 °F (37 °C) 97.3 °F (36.3 °C) 98.3 °F (36.8 °C)   SpO2: 97% 91% 99% 98%   Weight:       Height:           Alert and oriented x3. right exam of the hip reveals that the dressing is clean, dry and intact. The patient is able to fire the quadriceps / flex at the hip  Sensation is intact to light touch. No calf pain.      Labs:  Recent Labs      01/16/18   0521   HGB  9.8*   NA  142   K  3.9   CL  107   CO2  28   BUN  12   CREA  1.05   GLU  86        Patient mobility           Agnieszka Barillas MD  Cell [phone]  Medical Staff : Raymona Cowden / Jose [facility]  Office : [phone]

## 2018-01-16 NOTE — PROGRESS NOTES
Problem: Mobility Impaired (Adult and Pediatric)  Goal: *Acute Goals and Plan of Care (Insert Text)  Physical Therapy Goals  Initiated 1/16/2018    1. Patient will move from supine to sit and sit to supine  in bed with modified independence within 4 days. 2. Patient will perform sit to stand with modified independence within 4 days. 3. Patient will ambulate with supervision/set-up for 50 feet with the least restrictive device within 4 days. 4. Patient will ascend/descend ramp with 0 handrail(s) with supervision/set-up within 4 days. 5. Patient will verbalize and demonstrate understanding of Anterior MARYJANE precautions per protocol within 4 days. 6. Patient will perform Anterior hip home exercise program per protocol with modified independence within 4 days. physical Therapy TREATMENT  Patient: Steffany Mehta (66 y.o. male)  Date: 1/16/2018  Diagnosis: OSTEOARTHRITIS RIGHT HIP  S/P total hip arthroplasty  Primary osteoarthritis of right hip Primary osteoarthritis of right hip  Procedure(s) (LRB):  RIGHT TOTAL HIP REPLACEMENT DIRECT ANTERIOR (Right) 1 Day Post-Op  Precautions: WBAT, Fall, Total hip (anterior)    ASSESSMENT:  Patient able to increase his ambulation distance to 35 feet with RW and MIN A x2. Improved initial standing balance and no loss of balance with activity. He was able to perform supine LE exercises prior to mobilizing. Patient returned to sitting up in chair. Progression toward goals:  []    Improving appropriately and progressing toward goals  []    Improving slowly and progressing toward goals  []    Not making progress toward goals and plan of care will be adjusted     PLAN:  Patient continues to benefit from skilled intervention to address the above impairments. Continue treatment per established plan of care.   Discharge Recommendations:  Home Health  Further Equipment Recommendations for Discharge:  R     SUBJECTIVE:   Patient stated .    OBJECTIVE DATA SUMMARY:   Critical Behavior:  Neurologic State: Alert  Orientation Level: Oriented X4  Cognition: Appropriate decision making, Appropriate for age attention/concentration, Appropriate safety awareness, Follows commands     Functional Mobility Training:  Bed Mobility:  Rolling: Moderate assistance;Assist x1;Additional time  Supine to Sit: Moderate assistance;Assist x1;Additional time  Sit to Supine: Maximum assistance  Scooting: Moderate assistance        Transfers:  Sit to Stand: Minimum assistance;Assist x2; Additional time  Stand to Sit: Minimum assistance;Assist x2; Additional time        Bed to Chair: Minimum assistance;Assist x2; Additional time                    Balance:  Sitting: Intact  Standing: Impaired  Standing - Static: Fair  Standing - Dynamic : Poor (posterior lean)  Ambulation/Gait Training:  Distance (ft): 35 Feet (ft)  Assistive Device: Walker, rolling;Gait belt  Ambulation - Level of Assistance: Minimal assistance;Assist x2     Gait Description (WDL): Exceptions to WDL  Gait Abnormalities: Step to gait                                      Stairs:            Neuro Re-Education:    Therapeutic Exercises: Ankle pumps, hip ABD, quad sets, heel slides  2 sets of 10 bilateral LE  Pain:                    Activity Tolerance:   Good- no complications with upright activity  Please refer to the flowsheet for vital signs taken during this treatment.   After treatment:   [x]    Patient left in no apparent distress sitting up in chair  []    Patient left in no apparent distress in bed  [x]    Call bell left within reach  [x]    Nursing notified  []    Caregiver present  []    Bed alarm activated    COMMUNICATION/COLLABORATION:   The patients plan of care was discussed with: Registered Nurse    Sp Regan PT, DPT   Time Calculation: 24 mins

## 2018-01-17 LAB — HGB BLD-MCNC: 9.5 G/DL (ref 12.1–17)

## 2018-01-17 PROCEDURE — 74011250637 HC RX REV CODE- 250/637: Performed by: PHYSICIAN ASSISTANT

## 2018-01-17 PROCEDURE — 97116 GAIT TRAINING THERAPY: CPT

## 2018-01-17 PROCEDURE — 85018 HEMOGLOBIN: CPT | Performed by: PHYSICIAN ASSISTANT

## 2018-01-17 PROCEDURE — 65270000029 HC RM PRIVATE

## 2018-01-17 PROCEDURE — 97110 THERAPEUTIC EXERCISES: CPT

## 2018-01-17 PROCEDURE — 36415 COLL VENOUS BLD VENIPUNCTURE: CPT | Performed by: PHYSICIAN ASSISTANT

## 2018-01-17 PROCEDURE — 97535 SELF CARE MNGMENT TRAINING: CPT

## 2018-01-17 RX ADMIN — METOCLOPRAMIDE HYDROCHLORIDE 10 MG: 10 TABLET ORAL at 12:16

## 2018-01-17 RX ADMIN — ACETAMINOPHEN 650 MG: 325 TABLET ORAL at 00:10

## 2018-01-17 RX ADMIN — EZETIMIBE 10 MG: 10 TABLET ORAL at 08:49

## 2018-01-17 RX ADMIN — POLYETHYLENE GLYCOL 3350 17 G: 17 POWDER, FOR SOLUTION ORAL at 08:51

## 2018-01-17 RX ADMIN — ACETAMINOPHEN 650 MG: 325 TABLET ORAL at 05:16

## 2018-01-17 RX ADMIN — CELECOXIB 200 MG: 100 CAPSULE ORAL at 18:00

## 2018-01-17 RX ADMIN — ACETAMINOPHEN 650 MG: 325 TABLET ORAL at 12:16

## 2018-01-17 RX ADMIN — DOCUSATE SODIUM AND SENNOSIDES 1 TABLET: 8.6; 5 TABLET, FILM COATED ORAL at 18:00

## 2018-01-17 RX ADMIN — METOCLOPRAMIDE HYDROCHLORIDE 10 MG: 10 TABLET ORAL at 08:48

## 2018-01-17 RX ADMIN — Medication 10 ML: at 05:16

## 2018-01-17 RX ADMIN — BUSPIRONE HYDROCHLORIDE 10 MG: 5 TABLET ORAL at 08:48

## 2018-01-17 RX ADMIN — FLUOXETINE 20 MG: 20 CAPSULE ORAL at 08:49

## 2018-01-17 RX ADMIN — SIMVASTATIN 40 MG: 20 TABLET, FILM COATED ORAL at 22:32

## 2018-01-17 RX ADMIN — GABAPENTIN 300 MG: 300 CAPSULE ORAL at 22:32

## 2018-01-17 RX ADMIN — FAMOTIDINE 20 MG: 20 TABLET, FILM COATED ORAL at 18:00

## 2018-01-17 RX ADMIN — FAMOTIDINE 20 MG: 20 TABLET, FILM COATED ORAL at 08:49

## 2018-01-17 RX ADMIN — DOCUSATE SODIUM AND SENNOSIDES 1 TABLET: 8.6; 5 TABLET, FILM COATED ORAL at 08:48

## 2018-01-17 RX ADMIN — GABAPENTIN 300 MG: 300 CAPSULE ORAL at 16:30

## 2018-01-17 RX ADMIN — ASPIRIN 325 MG: 325 TABLET, DELAYED RELEASE ORAL at 18:10

## 2018-01-17 RX ADMIN — METOCLOPRAMIDE HYDROCHLORIDE 10 MG: 10 TABLET ORAL at 22:32

## 2018-01-17 RX ADMIN — PANTOPRAZOLE SODIUM 40 MG: 40 TABLET, DELAYED RELEASE ORAL at 08:48

## 2018-01-17 RX ADMIN — MONTELUKAST SODIUM 10 MG: 10 TABLET, FILM COATED ORAL at 18:00

## 2018-01-17 RX ADMIN — CELECOXIB 200 MG: 100 CAPSULE ORAL at 08:48

## 2018-01-17 RX ADMIN — LORATADINE 10 MG: 10 TABLET ORAL at 08:49

## 2018-01-17 RX ADMIN — METOCLOPRAMIDE HYDROCHLORIDE 10 MG: 10 TABLET ORAL at 16:35

## 2018-01-17 RX ADMIN — Medication 10 ML: at 16:30

## 2018-01-17 RX ADMIN — Medication 5 ML: at 22:00

## 2018-01-17 RX ADMIN — ACETAMINOPHEN 650 MG: 325 TABLET ORAL at 18:00

## 2018-01-17 RX ADMIN — GABAPENTIN 300 MG: 300 CAPSULE ORAL at 08:49

## 2018-01-17 RX ADMIN — ASPIRIN 325 MG: 325 TABLET, DELAYED RELEASE ORAL at 08:49

## 2018-01-17 NOTE — PROGRESS NOTES
Bedside and Verbal shift change report given to Aspen Soria RN (oncoming nurse) by Mat Heck RN (offgoing nurse). Report included the following information SBAR.

## 2018-01-17 NOTE — PROGRESS NOTES
Problem: Self Care Deficits Care Plan (Adult)  Goal: *Acute Goals and Plan of Care (Insert Text)  Occupational Therapy Goals  Initiated 1/16/2018     1. Patient will perform lower body dressing using Reacher, Stocking Aid and Long [employer]  PRN at supervision/set-up level within 7 days. 2. Patient will perform toilet transfers at supervision/set-up level using Walkers, Type: Rolling Walker  within 7 days. 3. Patient will perform all aspects of toileting at supervision/set-up level within 7 days. 4. Patient will demonstrate 3/3 hip precautions without cues within 7 days. 5. Patient will complete grooming standing at sink x 4 mins with supervision within 7 day(s). Occupational Therapy TREATMENT  Patient: Kasi Monroy (28 y.o. male)  Date: 1/17/2018  Diagnosis: OSTEOARTHRITIS RIGHT HIP  S/P total hip arthroplasty  Primary osteoarthritis of right hip Primary osteoarthritis of right hip  Procedure(s) (LRB):  RIGHT TOTAL HIP REPLACEMENT DIRECT ANTERIOR (Right) 2 Days Post-Op  Precautions: WBAT, Fall, Total hip (anterior)  Chart, occupational therapy assessment, plan of care, and goals were reviewed. ASSESSMENT:Pt agreeable to OT. He was able to verbalize 2/3 anterior hip precautions and verbal cueing for third. After pt sat edge of bed he ambulated to rest room, transferred to [facility] with min assist and use of grab bars. Min assist for clothing management and slight urination on floor which pt wanted to clean up himself. Pt verbalized he had not been lying down long and wanted to return to bed post treatment. Recommend home health. Progression toward goals:  [x]          Improving appropriately and progressing toward goals  []          Improving slowly and progressing toward goals  []          Not making progress toward goals and plan of care will be adjusted     PLAN:  Patient continues to benefit from skilled intervention to address the above impairments.   Continue treatment per established plan of care. Discharge Recommendations:  Home health  Further Equipment Recommendations for Discharge: None for OT     SUBJECTIVE:   Patient stated I'm OK.     OBJECTIVE DATA SUMMARY:   Cognitive/Behavioral Status:  Neurologic State: Alert; Appropriate for age  Orientation Level: Oriented X4  Cognition: Follows commands           Functional Mobility and Transfers for ADLs:   Bed Mobility:        Sit to Supine: Moderate assistance;Assist x2        Transfers:  Sit to Stand: Minimum assistance (raised height surface)  Functional Transfers  Toilet Transfer : Minimum assistance  Cues: Physical assistance  Adaptive Equipment: Grab bars; Walker (comment)  ADL retraining:  Pt able to verbalize 2/3 anterior hip precautions as they relate to self care. Verbal cueing for third precaution. Balance:  Sitting: Intact  Standing: With support    Pain:  Pain Scale 1: Numeric (0 - 10)  Pain Intensity 1: 0  Pain Location 1: Hip  Pain Orientation 1: Right  Pain Description 1: Aching  Pain Intervention(s) 1: Medication (see MAR)    Activity Tolerance:    Fair  Please refer to the flowsheet for vital signs taken during this treatment.   After treatment:   []  Patient left in no apparent distress sitting up in chair  [x]  Patient left in no apparent distress in bed  [x]  Call bell left within reach  []  Nursing notified  []  Caregiver present  [x]  Bed alarm activated    COMMUNICATION/COLLABORATION:   The patients plan of care was discussed with: Occupational Therapist    DANYEL Jarrell  Time Calculation: 18 mins

## 2018-01-17 NOTE — PROGRESS NOTES
Problem: Falls - Risk of  Goal: *Absence of Falls  Document Khris Fall Risk and appropriate interventions in the flowsheet. Outcome: Progressing Towards Goal  Fall Risk Interventions:  Mobility Interventions: Bed/chair exit alarm, Patient to call before getting OOB         Medication Interventions: Utilize gait belt for transfers/ambulation, Teach patient to arise slowly, Patient to call before getting OOB, Evaluate medications/consider consulting pharmacy, Bed/chair exit alarm, Assess postural VS orthostatic hypotension    Elimination Interventions:  Toileting schedule/hourly rounds, Urinal in reach, Toilet paper/wipes in reach, Elevated toilet seat, Call light in reach, Bed/chair exit alarm, Patient to call for help with toileting needs    History of Falls Interventions: Bed/chair exit alarm, Consult care management for discharge planning, Door open when patient unattended, Investigate reason for fall, Room close to nurse's station, Utilize gait belt for transfer/ambulation, Evaluate medications/consider consulting pharmacy

## 2018-01-17 NOTE — PROGRESS NOTES
CM met with pt who stated that he wanted to go to a SNF because he only gets one meal a day at home. Pt noted that he has not been able to cook for himself. His daughter only cooks him dinner, and she \"gets an attitude\" if he asks her to make him a meal.  Shahrzad Gleason S ([phone]) and spoke with on-call , Ms. Chio Gann. Report made. Due to the inclement weather, [facility] is closed. She will leave a message for APS worker, Yaya Sanchez, who will assess and determine if pt needs additional help. I gave Ms. Chio Gann a contact number for Marianela Soria, who will likely be covering  tomorrow.   Edison Santizo LCSW

## 2018-01-17 NOTE — PROGRESS NOTES
Problem: Mobility Impaired (Adult and Pediatric)  Goal: *Acute Goals and Plan of Care (Insert Text)  Physical Therapy Goals  Initiated 1/16/2018    1. Patient will move from supine to sit and sit to supine  in bed with modified independence within 4 days. 2. Patient will perform sit to stand with modified independence within 4 days. 3. Patient will ambulate with supervision/set-up for 50 feet with the least restrictive device within 4 days. 4. Patient will ascend/descend ramp with 0 handrail(s) with supervision/set-up within 4 days. 5. Patient will verbalize and demonstrate understanding of Anterior MARYJANE precautions per protocol within 4 days. 6. Patient will perform Anterior hip home exercise program per protocol with modified independence within 4 days. physical Therapy TREATMENT  Patient: Ashley Meyers (44 y.o. male)  Date: 1/17/2018  Diagnosis: OSTEOARTHRITIS RIGHT HIP  S/P total hip arthroplasty  Primary osteoarthritis of right hip Primary osteoarthritis of right hip  Procedure(s) (LRB):  RIGHT TOTAL HIP REPLACEMENT DIRECT ANTERIOR (Right) 2 Days Post-Op  Precautions: WBAT, Fall, Total hip (anterior)    ASSESSMENT:  Patient with increased pain and fatigue this AM. Patient required increased assistance with sit-stand due to posterior loss of balance requiring MOD A for steadying. Decreased step length bilaterally due to pain, he was premedicated prior. Patient able to increase ambulation distance to 60 feet. Patient returned to supine and performed LE exercises. Patient would benefit from additional day of PT due to slow progress. Progression toward goals:  []      Improving appropriately and progressing toward goals  [x]      Improving slowly and progressing toward goals  []      Not making progress toward goals and plan of care will be adjusted     PLAN:  Patient continues to benefit from skilled intervention to address the above impairments.   Continue treatment per established plan of care.  Discharge Recommendations:  Home Health  Further Equipment Recommendations for Discharge: Owns rollator but due to balance deficits would benefit from RW     SUBJECTIVE:   Patient stated Kera Rene can feel it.     OBJECTIVE DATA SUMMARY:   Critical Behavior:  Neurologic State: Alert, Appropriate for age, Eyes open spontaneously  Orientation Level: Oriented X4, Appropriate for age  Cognition: Appropriate safety awareness, Appropriate for age attention/concentration, Appropriate decision making, Follows commands     Functional Mobility Training:  Bed Mobility:        Sit to Supine: Moderate assistance;Assist x2           Transfers:  Sit to Stand: Moderate assistance;Assist x1;Additional time  Stand to Sit: Minimum assistance;Assist x1        Bed to Chair: Minimum assistance;Assist x1;Additional time                    Balance:  Sitting: Intact  Standing: With support  Ambulation/Gait Training:  Distance (ft): 60 Feet (ft)  Assistive Device: Walker, rolling;Gait belt  Ambulation - Level of Assistance: Minimal assistance;Assist x1;Additional time        Gait Abnormalities: Decreased step clearance; Step to gait; Antalgic                                      Stairs:              Therapeutic Exercises:   SUPINE  EXERCISES   Sets   Reps   Active Active Assist   Passive Self ROM   Comments   Ankle Pumps 2 10 [x]                                        []                                        []                                        []                                           Quad Sets 2 10 [x]                                        []                                        []                                        []                                           Heel Slides 2 10 []                                        [x]                                        []                                        []                                           Hip Abduction 2 10 []                                        [x] []                                        []                                           Glut Sets   []                                        []                                        []                                        []                                              []                                        []                                        []                                        []                                              []                                        []                                        []                                        []                                             STANDING  EXERCISES   Sets   Reps   Active Active Assist   Passive Self ROM   Comments   Heel Raises   []                                        []                                        []                                        []                                           Hip Abduction   []                                        []                                        []                                        []                                              []                                        []                                        []                                        []                                              []                                        []                                        []                                        []                                             Pain:  Pain Scale 1: Numeric (0 - 10)  Pain Intensity 1: 0              Activity Tolerance:   Good- no complications with upright activity  Please refer to the flowsheet for vital signs taken during this treatment.   After treatment:   [] Patient left in no apparent distress sitting up in chair  [x] Patient left in no apparent distress in bed  [x] Call bell left within reach  [x] Nursing notified  [] Caregiver present  [x] Bed alarm activated    COMMUNICATION/COLLABORATION:   The patients plan of care was discussed with: Registered Nurse    Govind Cole PT, DPT   Time Calculation: 25 mins

## 2018-01-17 NOTE — PROGRESS NOTES
Problem: Mobility Impaired (Adult and Pediatric)  Goal: *Acute Goals and Plan of Care (Insert Text)  Physical Therapy Goals  Initiated 1/16/2018    1. Patient will move from supine to sit and sit to supine  in bed with modified independence within 4 days. 2. Patient will perform sit to stand with modified independence within 4 days. 3. Patient will ambulate with supervision/set-up for 50 feet with the least restrictive device within 4 days. 4. Patient will ascend/descend ramp with 0 handrail(s) with supervision/set-up within 4 days. 5. Patient will verbalize and demonstrate understanding of Anterior MARYJANE precautions per protocol within 4 days. 6. Patient will perform Anterior hip home exercise program per protocol with modified independence within 4 days. physical Therapy TREATMENT  Patient: Kennedy Carpio (61 y.o. male)  Date: 1/17/2018  Diagnosis: OSTEOARTHRITIS RIGHT HIP  S/P total hip arthroplasty  Primary osteoarthritis of right hip Primary osteoarthritis of right hip  Procedure(s) (LRB):  RIGHT TOTAL HIP REPLACEMENT DIRECT ANTERIOR (Right) 2 Days Post-Op  Precautions: WBAT, Fall, Total hip (anterior)    ASSESSMENT:  Patient ambulated 30 feet with RW, continues with decreased LE foot clearance due to decreased strength and pain. Sit-stand from raised height surface at MIN A and increased verbal cuing for sequencing. Patient still with increased difficulty with sit-stands and at a chair height surface requires MOD A due to increased posterior loss of balance. He will benefit from continued PT to address sit-stand and stair training. Also, patient premorbid device is rollator and his daughter is trying to \"find\" his RW. Currently with increased balance deficits and rollator would  Be unsafe for patient. He is unable to get new RW through insurance per case management.     Progression toward goals:  []    Improving appropriately and progressing toward goals  [x]    Improving slowly and progressing toward goals  []    Not making progress toward goals and plan of care will be adjusted     PLAN:  Patient continues to benefit from skilled intervention to address the above impairments. Continue treatment per established plan of care. Discharge Recommendations:  Home Health  Further Equipment Recommendations for Discharge:  RW     SUBJECTIVE:   Patient stated Yana Lin keep going backwards, that happened before too.     OBJECTIVE DATA SUMMARY:   Critical Behavior:  Neurologic State: Alert, Appropriate for age, Eyes open spontaneously  Orientation Level: Oriented X4, Appropriate for age  Cognition: Follows commands, Appropriate decision making, Appropriate for age attention/concentration, Appropriate safety awareness     Functional Mobility Training:  Bed Mobility:        Sit to Supine: Moderate assistance;Assist x2           Transfers:  Sit to Stand: Minimum assistance (raised height surface)  Stand to Sit: Minimum assistance        Bed to Chair: Minimum assistance                    Balance:  Sitting: Intact  Standing: With support  Ambulation/Gait Training:  Distance (ft): 30 Feet (ft)  Assistive Device: Walker, rolling;Gait belt  Ambulation - Level of Assistance: Minimal assistance        Gait Abnormalities: Decreased step clearance; Step to gait; Antalgic                                      Stairs:            Neuro Re-Education:    Therapeutic Exercises:     Pain:  Pain Scale 1: Numeric (0 - 10)  Pain Intensity 1: 0  Pain Location 1: Hip  Pain Orientation 1: Right  Pain Description 1: Aching  Pain Intervention(s) 1: Medication (see MAR)  Activity Tolerance:   Good- no complications with upright activity  Please refer to the flowsheet for vital signs taken during this treatment.   After treatment:   [x]    Patient left in no apparent distress sitting up in chair  []    Patient left in no apparent distress in bed  [x]    Call bell left within reach  [x]    Nursing notified  []    Caregiver present  [x]    Chair alarm activated    COMMUNICATION/COLLABORATION:   The patients plan of care was discussed with: Registered Nurse    Laura Drummond PT, DPT   Time Calculation: 15 mins

## 2018-01-17 NOTE — PROGRESS NOTES
TOTAL HIP ARTHROPLASTY DAILY NOTE     ASSESSMENT / PLAN :   1. Pain Control : Excellent - Able to sleep and participate with therapy  2. Overnight Issues : none reported  3. Wound or incisional issue : Healing incision with no visible drainage  4. Therapy / Weight Bearing Recommendations : Weight bear as tolerated with use of a walker and two person assist while mobilizing  5. DVT Prophylaxis : Mechanical and Aspirin and mechanical lower extremity compression device  6. Disposition : Discharge to home with home health (maximum of 4 visits)  7. Medical Concerns : Stable  8. Comments : Anticipate discharge home today vs tomorrow pending clearance by PT, weather permitting       POD  2 Days Post-Op s/p Procedure(s):  RIGHT TOTAL HIP REPLACEMENT DIRECT ANTERIOR     SUBJECTIVE :     Concerns : \"I think tomorrow would be best to go home\". OBJECTIVE :     Vitals:    01/16/18 1538 01/16/18 1932 01/16/18 2238 01/17/18 0557   BP: 128/60 117/59 124/58 133/62   Pulse: 78 76 69 72   Resp: 16 15 14 13   Temp: 98.5 °F (36.9 °C) 98 °F (36.7 °C) 98.6 °F (37 °C) 98.3 °F (36.8 °C)   SpO2: 92% 97% 94% 90%   Weight:       Height:           Alert and oriented x3. right exam of the hip reveals that the dressing is clean, dry and intact. The patient is able to fire the quadriceps / flex at the hip  Sensation is intact to light touch. No calf pain.      Labs:  Recent Labs      01/17/18   0139  01/16/18   0521   HGB  9.5*  9.8*   NA   --   142   K   --   3.9   CL   --   107   CO2   --   28   BUN   --   12   CREA   --   1.05   GLU   --   86        Patient mobility  Gait  Gait Abnormalities: Step to gait  Ambulation - Level of Assistance: Minimal assistance, Assist x2  Distance (ft): 35 Feet (ft)  Assistive Device: Walker, rolling, Gait belt        Delfin Kyle MD  Cell [phone]  Medical Staff : Kain Garcia  Office : [phone]

## 2018-01-17 NOTE — PROGRESS NOTES
1905: Bedside and Verbal shift change report given to [address] [place] [address] (oncoming nurse) by [facility] (offgoing nurse). Report included the following information SBAR, Kardex, Procedure Summary, Intake/Output, MAR and Recent Results.

## 2018-01-17 NOTE — ROUTINE PROCESS
1/17/18   8:45AM  Left VM for Dr. Aba Zepeda nurse requesting PCP [facility]Y appt for Monday. Requested that office contact pt tomorrow if practice is closed for inclement weather today and unable to return cm specialist's call. Left cm specialist and pt's phone number in message.  Lindsay Garcia CM Specialist

## 2018-01-18 VITALS
RESPIRATION RATE: 16 BRPM | TEMPERATURE: 97.5 F | HEIGHT: 67 IN | BODY MASS INDEX: 32.8 KG/M2 | SYSTOLIC BLOOD PRESSURE: 131 MMHG | HEART RATE: 73 BPM | DIASTOLIC BLOOD PRESSURE: 61 MMHG | WEIGHT: 208.99 LBS | OXYGEN SATURATION: 93 %

## 2018-01-18 PROCEDURE — 97116 GAIT TRAINING THERAPY: CPT

## 2018-01-18 PROCEDURE — 97535 SELF CARE MNGMENT TRAINING: CPT

## 2018-01-18 PROCEDURE — 77030012890

## 2018-01-18 PROCEDURE — 97530 THERAPEUTIC ACTIVITIES: CPT

## 2018-01-18 PROCEDURE — 74011250637 HC RX REV CODE- 250/637: Performed by: PHYSICIAN ASSISTANT

## 2018-01-18 RX ADMIN — EZETIMIBE 10 MG: 10 TABLET ORAL at 09:03

## 2018-01-18 RX ADMIN — ASPIRIN 325 MG: 325 TABLET, DELAYED RELEASE ORAL at 09:03

## 2018-01-18 RX ADMIN — METOCLOPRAMIDE HYDROCHLORIDE 10 MG: 10 TABLET ORAL at 16:02

## 2018-01-18 RX ADMIN — FLUOXETINE 20 MG: 20 CAPSULE ORAL at 09:03

## 2018-01-18 RX ADMIN — FAMOTIDINE 20 MG: 20 TABLET, FILM COATED ORAL at 09:03

## 2018-01-18 RX ADMIN — BUSPIRONE HYDROCHLORIDE 10 MG: 5 TABLET ORAL at 09:03

## 2018-01-18 RX ADMIN — PANTOPRAZOLE SODIUM 40 MG: 40 TABLET, DELAYED RELEASE ORAL at 09:03

## 2018-01-18 RX ADMIN — GABAPENTIN 300 MG: 300 CAPSULE ORAL at 09:03

## 2018-01-18 RX ADMIN — METOCLOPRAMIDE HYDROCHLORIDE 10 MG: 10 TABLET ORAL at 11:16

## 2018-01-18 RX ADMIN — GABAPENTIN 300 MG: 300 CAPSULE ORAL at 16:02

## 2018-01-18 RX ADMIN — POLYETHYLENE GLYCOL 3350 17 G: 17 POWDER, FOR SOLUTION ORAL at 09:03

## 2018-01-18 RX ADMIN — LORATADINE 10 MG: 10 TABLET ORAL at 09:03

## 2018-01-18 RX ADMIN — ACETAMINOPHEN 650 MG: 325 TABLET ORAL at 11:16

## 2018-01-18 RX ADMIN — METOCLOPRAMIDE HYDROCHLORIDE 10 MG: 10 TABLET ORAL at 06:36

## 2018-01-18 RX ADMIN — ACETAMINOPHEN 650 MG: 325 TABLET ORAL at 06:36

## 2018-01-18 RX ADMIN — DOCUSATE SODIUM AND SENNOSIDES 1 TABLET: 8.6; 5 TABLET, FILM COATED ORAL at 09:03

## 2018-01-18 RX ADMIN — CELECOXIB 200 MG: 100 CAPSULE ORAL at 09:03

## 2018-01-18 RX ADMIN — Medication 10 ML: at 06:37

## 2018-01-18 NOTE — PROGRESS NOTES
TOTAL HIP ARTHROPLASTY DAILY NOTE     ASSESSMENT / PLAN :   1. Pain Control : Excellent - Able to sleep and participate with therapy  2. Overnight Issues : none  3. Wound or incisional issue : Healing incision with no visible drainage  4. Therapy / Weight Bearing Recommendations : Weight bear as tolerated with use of a walker and two person assist while mobilizing  5. DVT Prophylaxis : Mechanical and Aspirin and mechanical lower extremity compression device  6. Disposition : Discharge to home with home health (maximum of 4 visits), I would lean toward an additional day or two in the hospital if needed. 7. Medical Concerns : stable  8. Comments : Stable, Case Management looking at this home situation. POD  3 Days Post-Op s/p Procedure(s):  RIGHT TOTAL HIP REPLACEMENT DIRECT ANTERIOR     SUBJECTIVE :     Concerns : stable. OBJECTIVE :     Vitals:    01/17/18 1508 01/17/18 1934 01/17/18 2301 01/18/18 0333   BP: 135/60 108/50 179/78 143/82   Pulse: 72 75 75 69   Resp: 18 15 17 15   Temp: 98.2 °F (36.8 °C) 98.3 °F (36.8 °C) 98.3 °F (36.8 °C) 98.4 °F (36.9 °C)   SpO2: 95% 92% 98% 92%   Weight:       Height:           Alert and oriented x3. right exam of the hip reveals that the dressing is clean, dry and intact. The patient is able to fire the quadriceps / flex at the hip  Sensation is intact to light touch. No calf pain.      Labs:  Recent Labs      01/17/18   0139  01/16/18   0521   HGB  9.5*  9.8*   NA   --   142   K   --   3.9   CL   --   107   CO2   --   28   BUN   --   12   CREA   --   1.05   GLU   --   86        Patient mobility  Gait  Gait Abnormalities: Decreased step clearance, Step to gait, Antalgic  Ambulation - Level of Assistance: Minimal assistance  Distance (ft): 30 Feet (ft)  Assistive Device: Walker, rolling, Gait belt        Agnieszka Barillsa MD  Cell [phone]  Medical Staff : Raymona Cowden / Jose Naranjo  Office : [phone]

## 2018-01-18 NOTE — PROGRESS NOTES
Problem: Mobility Impaired (Adult and Pediatric)  Goal: *Acute Goals and Plan of Care (Insert Text)  Physical Therapy Goals  Initiated 1/16/2018    1. Patient will move from supine to sit and sit to supine  in bed with modified independence within 4 days. 2. Patient will perform sit to stand with modified independence within 4 days. 3. Patient will ambulate with supervision/set-up for 50 feet with the least restrictive device within 4 days. 4. Patient will ascend/descend ramp with 0 handrail(s) with supervision/set-up within 4 days. 5. Patient will verbalize and demonstrate understanding of Anterior MARYJANE precautions per protocol within 4 days. 6. Patient will perform Anterior hip home exercise program per protocol with modified independence within 4 days. physical Therapy TREATMENT  Patient: Jacobo Cruz (10 y.o. male)  Date: 1/18/2018  Diagnosis: OSTEOARTHRITIS RIGHT HIP  S/P total hip arthroplasty  Primary osteoarthritis of right hip Primary osteoarthritis of right hip  Procedure(s) (LRB):  RIGHT TOTAL HIP REPLACEMENT DIRECT ANTERIOR (Right) 3 Days Post-Op  Precautions: WBAT, Fall, Total hip (anterior)    ASSESSMENT:  Pt received standing in room with lift team member, total A for bowel hygiene. Able to verbalize 2/3 ant hip precautions. Performed sit<>stand from chair x 4 with min A. Mod verbal cues to scoot to edge and forward lean. No LOB upon standing. Gait training using RW with Min A/CGA for steadying, demonstrates slow gait with decreased step clearance. Required verbal cues to keep walker closer and maintain upright posture, rollator would not be safe, at this time, secondary to patient's balance deficits. Mod A for sit<>supine bed mobility to manage BLE from flat bed surface. Pt returned to chair.    Progression toward goals:  []      Improving appropriately and progressing toward goals  [x]      Improving slowly and progressing toward goals  []      Not making progress toward goals and plan of care will be adjusted     PLAN:  Patient continues to benefit from skilled intervention to address the above impairments. Continue treatment per established plan of care. Discharge Recommendations:  Home Health  Further Equipment Recommendations for Discharge:  Rolling walker     SUBJECTIVE:   Patient stated I am feeling ok.     OBJECTIVE DATA SUMMARY:   Critical Behavior:  Neurologic State: Alert  Orientation Level: Oriented X4  Cognition: Appropriate decision making, Appropriate for age attention/concentration, Appropriate safety awareness, Follows commands     Functional Mobility Training:  Bed Mobility:     Supine to Sit: Moderate assistance  Sit to Supine: Moderate assistance  Scooting: Contact guard assistance; Additional time        Transfers:  Sit to Stand: Minimum assistance; Additional time  Stand to Sit: Contact guard assistance                             Balance:  Sitting: Intact  Standing: Intact; With support  Standing - Static: Good  Standing - Dynamic : Fair  Ambulation/Gait Training:  Distance (ft): 50 Feet (ft)  Assistive Device: Walker, rolling;Gait belt           Gait Abnormalities: Decreased step clearance; Antalgic; Step to gait                                      Stairs:              Therapeutic Exercises:   SUPINE  EXERCISES   Sets   Reps   Active Active Assist   Passive Self ROM   Comments   Ankle Pumps   [x]                                        []                                        []                                        []                                           Quad Sets   [x]                                        []                                        []                                        []                                           Heel Slides   [x]                                        []                                        []                                        []                                           Hip Abduction   [] []                                        []                                        []                                           Glut Sets   []                                        []                                        []                                        []                                              []                                        []                                        []                                        []                                              []                                        []                                        []                                        []                                             STANDING  EXERCISES   Sets   Reps   Active Active Assist   Passive Self ROM   Comments   Heel Raises   [x]                                        []                                        []                                        []                                           Hip Abduction   []                                        []                                        []                                        []                                              []                                        []                                        []                                        []                                              []                                        []                                        []                                        []                                             Pain:  Pain Scale 1: Numeric (0 - 10)  Pain Intensity 1: 0              Activity Tolerance:   Good  Please refer to the flowsheet for vital signs taken during this treatment.   After treatment:   [x] Patient left in no apparent distress sitting up in chair  [] Patient left in no apparent distress in bed  [x] Call bell left within reach  [x] Nursing notified  [] Caregiver present  [x] Bed alarm activated    COMMUNICATION/COLLABORATION:   The patients plan of care was discussed with: Registered Nurse    Lazara Yee   Time Calculation: 23 mins

## 2018-01-18 NOTE — PROGRESS NOTES
1/18/2018     12:45 PM  CM spoke with pt's DTR who agreed to provide pt 3 meals a day during his recovery, and to assist pt during his recovery. CM informed DTR of [employer] referral. CM will inform DTR when DC order is submitted. 11:17 AM  CM spoke with pt's DTR who reported that if pt is discharged today, she will be able to transport. PHILLIP spoke with Mrs. Espinal with [employer] who reported that where the pt is discharged is up to attending physician, and that they will be following up with pt within 72 hours. CM called [employer] and left them a voicemail requesting they follow-up with pt for potential needs: meals on wheels, home aid, etc. CM delivered RW. Awaiting DC order. 9:23 AM  CM attempted p/c to [address] CM left voicemail for April. CM spoke with PT who related that pt required a RW over a rollator. CM got approval from [address] to provide pt a RW from [facility], and have [facility] Case Management billed if pt is sent home.

## 2018-01-18 NOTE — PROGRESS NOTES
Problem: Mobility Impaired (Adult and Pediatric)  Goal: *Acute Goals and Plan of Care (Insert Text)  Physical Therapy Goals  Initiated 1/16/2018    1. Patient will move from supine to sit and sit to supine  in bed with modified independence within 4 days. 2. Patient will perform sit to stand with modified independence within 4 days. 3. Patient will ambulate with supervision/set-up for 50 feet with the least restrictive device within 4 days. 4. Patient will ascend/descend ramp with 0 handrail(s) with supervision/set-up within 4 days. 5. Patient will verbalize and demonstrate understanding of Anterior MARYJANE precautions per protocol within 4 days. 6. Patient will perform Anterior hip home exercise program per protocol with modified independence within 4 days. physical Therapy TREATMENT  Patient: Aura Barragan (73 y.o. male)  Date: 1/18/2018  Diagnosis: OSTEOARTHRITIS RIGHT HIP  S/P total hip arthroplasty  Primary osteoarthritis of right hip Primary osteoarthritis of right hip  Procedure(s) (LRB):  RIGHT TOTAL HIP REPLACEMENT DIRECT ANTERIOR (Right) 3 Days Post-Op  Precautions: WBAT, Fall, Total hip (anterior)  ASSESSMENT:  Two Family members present and report ramp to enter home is not to code and not suitable for w/c use or walking up. Pt performed bed mobility with min/ mod a for managing RLE.sit<>stand with CGA/ min A usingRW for steadying. Gait training x 60' using RW with CGA. No LOB during gait. Ascend/ descend 4 steps using single handrail on R using Sideways technique with CGA. Handout given to patient and family for proper technique. Patient does require additional time for functional transfers and gait,  Family agrees they are able to assist patient at this level. Pt verbalized understanding to use RW for all gait and transfers. Pt received RW for home use. Review of proper car transfers. Pt returned to chair, chair alarm in place.  Pt is cleared for discharge from hospital from PT perspective. Progression toward goals:  [x]      Improving appropriately and progressing toward goals  []      Improving slowly and progressing toward goals  []      Not making progress toward goals and plan of care will be adjusted     PLAN:  Patient continues to benefit from skilled intervention to address the above impairments. Continue treatment per established plan of care. Discharge Recommendations:  Home Health  Further Equipment Recommendations for Discharge:  none     SUBJECTIVE:   I am doing ok    OBJECTIVE DATA SUMMARY:   Functional Mobility Training:  Bed Mobility:     Supine to Sit: Minimum assistance; Additional time  Sit to Supine: Moderate assistance  Scooting: Contact guard assistance; Additional time        Transfers:  Sit to Stand: Minimum assistance; Additional time  Stand to Sit: Minimum assistance                             Ambulation/Gait Training:  Distance (ft): 60 Feet (ft)  Assistive Device: Walker, rolling;Gait belt  Ambulation - Level of Assistance: Contact guard assistance;Minimal assistance        Gait Abnormalities: Decreased step clearance                                      Stairs:  Number of Stairs Trained: 4  Stairs - Level of Assistance: Contact guard assistance  Rail Use: Right     Therapeutic Exercises:   Exercises performed per protocol. See morning treatment note for description. Pain:  Pain Scale 1: Numeric (0 - 10)  Pain Intensity 1: 0              Activity Tolerance:   Good  Please refer to the flowsheet for vital signs taken during this treatment.   After treatment:   [x] Patient left in no apparent distress sitting up in chair  [] Patient left in no apparent distress in bed  [x] Call bell left within reach  [x] Nursing notified  [x] Caregiver present  [x] Chair alarm activated    COMMUNICATION/COLLABORATION:   The patients plan of care was discussed with: Registered Nurse    Cristina Gauthier   Time Calculation: 30 mins

## 2018-01-18 NOTE — DISCHARGE INSTRUCTIONS
TOTAL HIP DISCHARGE INSTRUCTIONS    Patient: Jacobo Cruz MRN: [id number]  SSN: [id number]              Please take the time to review the following instructions before you leave the hospital and use them as guidelines during your recovery from surgery. If you have any questions you may contact my office at [phone] or [phone]. After hours or during the weekend you may reach me personally at [phone] if there is an emergency. SPECIAL INSTRUCTIONS :   1. Do not bend greater than 90 degrees at the hip for 4 weeks following your discharge  2. Avoid exercises or activities which bring the leg out or away from the mid-line of the body. The surgical repair involves this muscle and it will require 4 weeks to heal. You may disregard these instructions for a direct anterior approach. 3. You may walk as tolerated and are encouraged to work daily on progressing your activities with a walker initially. 4. You may transition to a cane for walking 5-7 days from surgery once you feel safe. You may use a walker for longer periods if you feel unstable. Wound Care/ Dressing Changes:      DRESSING :     Aquacel Dressing : This may be removed by home health 7 days after the date of your surgery. If there is no drainage, then a simple dressing may be used or no dressing at all. Other dressing options can be purchased over the counter at a local pharmacy or medical supply vendor. A porous adhesive dressing such as pictured above can be purchased at a local [facility] or "[employer]". You only need to keep the incision covered for 7 days after showers. A dressing may be used for longer if there are issues with clothing clinging to the incision. Showering/ Bathing: You may shower with the aquacel dressing in place. This is left in place for 7 days following discharge from the hospital. If your incision is dry without drainage you may shower following your discharge home.   After 7 days your aquacel dressing should be removed for showering. It is fine to have water run over the incision. Do not vigorously scrub your incision. Apply a clean, dry dressing after you have dried your incision. Do not take a bath or get into a swimming pool / Jaspal Yordan until you follow up with Dr. Gabby Garcia. Do not soak your incision under water. If there is continued drainage or you are concerned contact Dr Jennifer Berumen office prior to showering [phone] ext [phone] or [phone]. Diet:  You may advance to your regular diet as tolerated. Increase your clear liquid intake for the next 2-3 days. Medication:      1. You will be given prescriptions for pain medication when you are discharged from the hospital. The side effects of these medications can be substantial and the narcotic medications are not mandatory. You may substitute these medications with Tylenol or Alleve / Motrin. 2.  Please use the medications as prescribed. Pain medications may cause constipation- Colace twice daily and Miralax one scoop daily while taking the narcotic medication should help prevent constipation. Please discuss with your local pharmacist regarding increasing this dosage if constipation persists. Other possible side effects of pain medication are dizziness, headache, nausea, vomiting, and urinary retention. Discontinue the pain medication if you develop itching, rash, shortness of breath, or difficulties swallowing. If these symptoms become severe or are not relieved by discontinuing the medication, you should seek immediate medical attention. 3. Refills of pain medication are authorized during office hours only (8 AM- 5 PM  Monday thru Friday). Many of these medication will require you or a family member to pick-up a physical prescription at the office. 4. Medications other than antiinflammatories will not be called into the pharmacy after business hours.    5. Do not take Tylenol/Acetaminophen in addition to your pain medication as most pain medications already contain this ingredient. Do not exceed 4000mg of Tylenol/Acetaminophen per day. 6. You may resume the medication(s) you were taking prior to your surgery. Narcotics may change the effects of some antidepressant medication(s). If you have any questions about possible interactions between your regular medications and the pain medication, you should ask the pharmacist or contact the prescribing physician. 7. You will be discharged with prescriptions for additional pain medications (Tramadol or Toradol) and a medication for nausea and vomiting (Phenergan). You only need to fill these prescriptions if the primary pain medication is not working or you experiencing post-op nausea. 8. If you have constipation which is not improved by oral stool softeners then a Ducolax suppository should be purchased over the counter. 9. Continue the blood thinner (Aspirin or Lovenox) for a total of 30 days following surgery. Follow up appointment:    Please call our office at [phone] for your follow up appointment. This should be scheduled 14 days following the date of surgery. Physical Therapy / Nursing:    Physical Therapy following surgery will be arranged at home along with at home nursing care. They have specific instructions for rehab and wound care. .     Returning to work:    Normal return to work is 3-12 weeks following total hip replacement. Depending on your progression following surgery and specific job duties you may take longer for a full return to work. DRIVING    You should not return to driving until you are off all narcotic pain medications and able to safely and quickly apply the brakes. This is normally 3-6 weeks for left hips and 4-8 weeks for right hip. Important Signs and Symptoms:    If any of the following signs or symptoms occur, you should contact Dr. Ivone Serrano office.   Please be advised if a problem arises which you feel requires immediate medical attention or you are unable to contact Dr. Delaney Downing office you should seek immediate medical attention at the ER or other health care facility you have access to.    1. A sudden increase in swelling and/or redness or warmth at the area your surgery was performed which isnt relieved by rest, ice, and elevation. 2. Oral temperature greater than 101 degrees for 12 hours or more which isnt relieved by an increase in fluid intake and taking 2 Tylenol every 4-6 hours. 3. Excessive drainage from your incisions, or drainage which hasnt stopped by 72 hours after your surgery. 4. Fever, chills, shortness of breath, chest pain, nausea, vomiting or other signs and symptoms which are of concern to you. frequently asked questions   What should I take for pain?  o In general you will be discharged with three medications for pain (Extra Strength Tylenol, Oxycodone 5mg and Tramadol). This may vary slightly depending on what you were taking in the hospital.   - 1st Line - Extra Strength Tylenol 1-2 tablets (500-1000mg) every 4-6 hrs.  After 2 days this dose should not exceed 8 tablets per day   This is the first and only medication you need to take. Initially you may need 2 tablets every 4 hours, but as your pain subsides, this will taper to 1 tablet every 6 hours. - 2nd Line - Tramadol 50mg (1 tablet) every 8 hours  - 3rd Line - Oxycodone 1 (5mg) - 2 (10mg) every 4 hours (Or as directed), take these between Percocet doses if your pain is not below 4 / 10. This may be needed only for several days following your discharge. - 4th Line - Add Alleve 220mg every 12 hours or Motrin 400mg (200mg x 2) every 8 hours   When should I call for advice regarding my pain?  o After 12 hours on the above regimen, if nothing is working call the office ([phone] ext [phone] or [phone]) or call my cell after hours [phone].  Can I get refills?  o Narcotic refills are provided for the first 6 weeks following surgery.    - I will generally try to taper down to a single narcotic medication by your two week appointment. o Try Tylenol 650mg along with Alleve 220mg or Motrin 200mg during the majority of the day. - Save the narcotic pain medications for physical therapy (1 hour prior) and before sleeping at night. - Keep in mind you need to discontinue these medications prior to returning to driving.  Is swelling normal?  o Almost everyone has some degree of swelling following surgery. o Following hip and knee replacement surgery, swelling can be normal below the incision for the first 1-2 weeks. - This swelling peaks around 5-7 days after surgery.   - You may have some bruising around the back of the thigh, calf and even into the foot.  What should I do for the swelling?  o Keep the limb elevated. o Apply compression socks (knee high for total knees and up to the mid-thigh for total hips.   o Heat or ice may be applied, choose the modality that makes you the most comfortable.  How long should I remain on blood thinners following surgery?  o Thirty days   When can I drive?  o Once you have stopped using regular narcotic pain medications (Percocet, Lortab, etc.) and can safely apply the brakes without hesitation.  When can I shower? o 72hours following surgery if the incision is dry.  o No submersion of the incision, bathing or swimming for 14 days following surgery or until cleared by Dr Adelia Prado.  What do I do with the dressing when I shower?  o The dressing can be removed. o The incision is sealed with Dermabond (Biologic glue) and except for wounds which are draining should be watertight.  How active should I be following surgery?   o Progress activities in moderation at your own pace.   o Walk each day and set progressive goals with small increments (1st week - ½ block of walking, 2nd week - 1 block, 3rd week - 2 blocks, etc.)    Please do not hesitate to call me at [phone] (cell phone) for questions following surgery - Pastor Gomez MD

## 2018-01-18 NOTE — INTERDISCIPLINARY ROUNDS
[address]    Patient Information    Name: Lynn Flaherty  Age: 81 y.o. Admission Date: 1/15/2018  Surgery/Procedure: Procedure(s):  RIGHT TOTAL HIP REPLACEMENT DIRECT ANTERIOR  Attending Provider: Johana Early MD  Surgeon: Kathleen Monte   Problem List: Principal Problem:    Primary osteoarthritis of right hip (1/15/2018)      Overview: RIGHT TOTAL HIP REPLACEMENT 1/15/18    Active Problems:    S/P total hip arthroplasty (1/15/2018)        During rounds the following quality care indicators and evidence based practices were addressed :       PT/OT: Patient mobility - min assist for sit to stand. Mod assist for bed mobility. Walked 50', min assist. No stairs at home, ramp entry. Should clear today. At baseline function. Bed Mobility Training  Rolling: Moderate assistance, Assist x1, Additional time  Supine to Sit: Moderate assistance  Sit to Supine: Moderate assistance  Scooting: Contact guard assistance, Additional time  Transfer Training  Sit to Stand: Minimum assistance, Additional time  Stand to Sit: Contact guard assistance  Bed to Chair: Minimum assistance      Gait Training  Assistive Device: Walker, rolling, Gait belt  Ambulation - Level of Assistance: Minimal assistance  Distance (ft): 50 Feet (ft)          Pain Assessment  Pain Intensity 1: 0 (01/18/18 0333)  Pain Location 1: Hip  Pain Intervention(s) 1: Medication (see MAR)  Patient Stated Pain Goal: 0    Pain meds: oxycodone  Antibiotics completed: yes  Anticoagulation: ASA bid  Bowel regimen: yes  Mechanical DVT prophylaxis: SCDs  Dawson: n    RRAT Score:      Readmit Risk Tool  Support Systems: Child(key) (daughter available 24/7)  Relationship with Primary Physician Group: Seen at least one time within the past 6 months    Discharge Management/Planning:    Readmit Risk Assessment Information:   Medium Risk            13       Total Score        3 Has Seen PCP in Last 6 Months (Yes=3, No=0)    5 Pt.  Coverage (Medicare=5 , Medicaid, or Self-Pay=4)    5 Charlson Comorbidity Score (Age + Comorbid Conditions)        Criteria that do not apply:    . Living with Significant Other. Assisted Living. LTAC. SNF. or   Rehab    Patient Length of Stay (>5 days = 3)    IP Visits Last 12 Months (1-3=4, 4=9, >4=11)         Readmit Risk Tool  Support Systems: Child(key) (daughter available 24/7)  Relationship with Primary Physician Group: Seen at least one time within the past 6 months    Anticipated Date of Discharge: today    Interdisciplinary team rounds were held with the following team members: Nurse Practitioner, Nursing, Pharmacy, and Rehab. See clinical pathway and/or care plan for interventions and desired outcomes.

## 2018-01-18 NOTE — PROGRESS NOTES
Nurse handed patient numerous scripts and a copy of discharge instructions. Medications and instructions read and explained to him. Daughter at his side. Nurse emphasized multiple times the importance of taking ASA two times a day to prevent blood clots.  Verbalized understanding

## 2018-01-18 NOTE — PROGRESS NOTES
Problem: Falls - Risk of  Goal: *Absence of Falls  Document Hkris Fall Risk and appropriate interventions in the flowsheet. Outcome: Progressing Towards Goal  Fall Risk Interventions:  Mobility Interventions: Bed/chair exit alarm, Patient to call before getting OOB         Medication Interventions: Bed/chair exit alarm, Patient to call before getting OOB, Teach patient to arise slowly    Elimination Interventions: Call light in reach, Bed/chair exit alarm, Patient to call for help with toileting needs    History of Falls Interventions: Bed/chair exit alarm, Door open when patient unattended        Bedside shift change report given to Shannon Oliver RN (oncoming nurse) by Adam Manning RN (offgoing nurse). Report included the following information SBAR, Kardex, Procedure Summary, Intake/Output, MAR and Recent Results.

## 2018-01-18 NOTE — PROGRESS NOTES
Spiritual Care Assessment/Progress Notes    Cornel Schneider [id number]  [id number]    1935  81 y.o.  male    Patient Telephone Number: [phone] (home)   Mu-ism Affiliation: Heidy Womack   Language: English   Extended Emergency Contact Information  Primary Emergency Contact: Kristofer Garcia  Address: [address] [place] [address]  Mobile Phone: [phone]  Relation: Daughter   Patient Active Problem List    Diagnosis Date Noted    Primary osteoarthritis of right hip 01/15/2018    S/P total hip arthroplasty 01/15/2018    Renal calculus     Hiatal hernia     Depression     Arthritis     Anxiety state     Osteoarthritis 10/20/2015    Primary osteoarthritis of left knee 10/20/2015    Primary localized osteoarthrosis, lower leg 10/20/2015    Hypertension 10/20/2015    Hyperlipidemia 10/20/2015    Status post joint replacement 03/18/2014    Right knee DJD 02/18/2014    Osteoarthrosis, unspecified whether generalized or localized, lower leg 01/16/2014    Meniscus tear 04/26/2013    Neuropathy, lower extremity 12/26/2011    Unspecified vitamin D deficiency 04/26/2011    Other dyspnea and respiratory abnormality 09/28/2010    Hypercholesteremia 07/06/2009    Insomnia 07/06/2009    GERD (gastroesophageal reflux disease) 07/06/2009    Allergic rhinitis 07/06/2009    Anxiety 07/06/2009    OA (osteoarthritis) 07/06/2009    Prostate cancer ([facility] 07/06/2009    Cancer ([facility] 01/01/2001        Date: 1/18/2018       Level of Mu-ism/Spiritual Activity:  []         Involved in toña tradition/spiritual practice    []         Not involved in toña tradition/spiritual practice  [x]         Spiritually oriented    []         Claims no spiritual orientation    []         seeking spiritual identity  []         Feels alienated from Uatsdin practice/tradition  []         Feels angry about Uatsdin practice/tradition  []         Spirituality/Uatsdin tradition is a resource for coping at this time. []         Not able to assess due to medical condition    Services Provided Today:  []         crisis intervention    []         reading Scriptures  [x]         spiritual assessment    []         prayer  []         empathic listening/emotional support  []         rites and rituals (cite in comments)  []         life review     []         Evangelical support  []         theological development   []         advocacy  []         ethical dialog     []         blessing  []         bereavement support    [x]         support to family  []         anticipatory grief support   []         help with AMD  []         spiritual guidance    []         meditation      Spiritual Care Needs  []         Emotional Support  []         Spiritual/Mu-ism Care  []         Loss/Adjustment  []         Advocacy/Referral                /Ethics  [x]         No needs expressed at               this time  []         Other: (note in               comments)  [address]  []         Follow up visits with               pt/family  []         Provide materials  []         Schedule sacraments  []         Contact Community               Clergy  [x]         Follow up as needed  []         Other: (note in               comments)     Comments:  Initial visit with patient and his daughter and son-in-law. Offered presence and supportive listening. Family expressing concerns about discharge plans concerning patient. Case management came to speak with family. Patient lives in [place] and is well-supported by daughter and others. Calming support provided to family; pastoral care is available to follow; no specific spiritual needs noted at this time. [phone]. Visit by: Peg Reyes. Shabnam Miramontes.  Sonu Patel MA, [facility]    Lead  Profession Development & Advancement

## 2018-01-18 NOTE — PROGRESS NOTES
Problem: Self Care Deficits Care Plan (Adult)  Goal: *Acute Goals and Plan of Care (Insert Text)  Occupational Therapy Goals  Initiated 1/16/2018     1. Patient will perform lower body dressing using Reacher, Stocking Aid and Long [employer]  PRN at supervision/set-up level within 7 days. 2. Patient will perform toilet transfers at supervision/set-up level using Walkers, Type: Rolling Walker  within 7 days. 3. Patient will perform all aspects of toileting at supervision/set-up level within 7 days. 4. Patient will demonstrate 3/3 hip precautions without cues within 7 days. 5. Patient will complete grooming standing at sink x 4 mins with supervision within 7 day(s). Occupational Therapy TREATMENT  Patient: Letty Jasso (63 y.o. male)  Date: 1/18/2018  Diagnosis: OSTEOARTHRITIS RIGHT HIP  S/P total hip arthroplasty  Primary osteoarthritis of right hip Primary osteoarthritis of right hip  Procedure(s) (LRB):  RIGHT TOTAL HIP REPLACEMENT DIRECT ANTERIOR (Right) 3 Days Post-Op  Precautions: WBAT, Fall, Total hip (anterior)  Chart, occupational therapy assessment, plan of care, and goals were reviewed. ASSESSMENT:  Pt agreeable to OT. After ambulation to the bathroom pt stood at the sink and used mouth wash and performed simple grooming with contact guard, no LOB. Pt tolerated standing for 6 minutes without difficulty. He wanted to sit in the chair and educated as to calling for assist when wanting to get up. Pt seated on chair alarm and RN aware pt up in chair. Recommend home health. Progression toward goals:  []          Improving appropriately and progressing toward goals  [x]          Improving slowly and progressing toward goals  []          Not making progress toward goals and plan of care will be adjusted     PLAN:  Patient continues to benefit from skilled intervention to address the above impairments. Continue treatment per established plan of care.   Discharge Recommendations: Home health  Further Equipment Recommendations for Discharge: None for OT     SUBJECTIVE:   Patient stated That felt good.     OBJECTIVE DATA SUMMARY:   Cognitive/Behavioral Status:  Neurologic State: Alert  Orientation Level: Oriented X4  Cognition: Appropriate decision making           Functional Mobility and Transfers for ADLs:   Bed Mobility:     Supine to Sit: Moderate assistance  Sit to Supine: Moderate assistance  Scooting: Contact guard assistance; Additional time     Transfers:  Sit to Stand: Minimum assistance; Additional time       Balance:  Sitting: Intact  Standing: Intact; With support  Standing - Static: Good  Standing - Dynamic : Fair  ADL Intervention:       Grooming  Brushing Teeth: Contact guard assistance (standing at sink to use mouthwash)  Pt stated 2/3 anterior hip precautions. Pain:  Pain Scale 1: Numeric (0 - 10)  Pain Intensity 1: 0                Activity Tolerance:    No signs/symptoms of distress or discomfort during OT  Please refer to the flowsheet for vital signs taken during this treatment.   After treatment:   [x]  Patient left in no apparent distress sitting up in chair  []  Patient left in no apparent distress in bed  [x]  Call bell left within reach  [x]  Nursing notified  []  Caregiver present  [x]  Chair alarm activated    COMMUNICATION/COLLABORATION:   The patients plan of care was discussed with: Occupational Therapist and Registered Nurse    DANYEL Napoles  Time Calculation: 16 mins

## 2018-01-22 NOTE — DISCHARGE SUMMARY
@5NEDM@ [address] [place], [place] [address]    DISCHARGE SUMMARY     Patient: Joseph Boateng                             Medical Record Number: [id number]                : 1935  Age: 81 y.o. Admit Date: 1/15/2018  Discharge Date: 2018    Admission Diagnosis: OSTEOARTHRITIS RIGHT HIP  S/P total hip arthroplasty  Primary osteoarthritis of right hip  Discharge Diagnosis: OSTEOARTHRITIS RIGHT HIP    Procedures: Procedure(s):  RIGHT TOTAL HIP REPLACEMENT DIRECT ANTERIOR    Surgeon: Clarisse Mcburney, MD  Assistants: Yobani Brown PA-C    Anesthesia: spinal  Complications: None     History of Present Illness:  Joseph Boateng is a 81 y.o. male with a history of Right hip pain, swelling, and marked loss of function. Despite conservative management and after clinical and radiographic evaluation, it was determined that he suffered from end-stage osteoarthritis and would benefit from Procedure(s):  RIGHT TOTAL HIP REPLACEMENT DIRECT ANTERIOR, which he consented to undergo after a discussion of the risks, benefits, alternatives, rehab concerns, and potential complications of surgery. Hospital Course:  Joseph Boateng tolerated the procedure well. He was transferred  to the recovery room in stable condition. After a brief stay the patient was then transferred to the Joint Replacement Unit at [address] [place] [address]. On postoperative day #1, the dressing was clean and dry, he was neurovascularly intact. The patient was afebrile and vital signs were stable. Calves were soft and non-tender bilaterally. On postoperative day  # 2, the patient was tolerating a regular diet and making satisfactory progress with physical therapy.       Hemoglobin and INR prior to discharge were   Lab Results   Component Value Date/Time    HGB 9.5 2018 01:39 AM    INR 1.0 10/13/2015 09:33 AM       Luis Dubose was cleared by physical therapy and was discharged to Home in stable condition on postoperative day 3. He was provided with routine postoperative instructions and advised to follow up in my office in 2 weeks following discharge from the hospital.  He was prescribed aspirin for DVT prophylaxis, tramadol and oxycodone for post-operative pain, dulcolax suppository for constipation, and zofran for nausea. Discharge Medications:  Cannot display discharge medications since this patient is not currently admitted.       Signed by: Alisa Almodovar MD  1/21/2018

## 2018-01-30 ENCOUNTER — APPOINTMENT (OUTPATIENT)
Dept: GENERAL RADIOLOGY | Age: 83
DRG: 467 | End: 2018-01-30
Attending: EMERGENCY MEDICINE
Payer: MEDICARE

## 2018-01-30 ENCOUNTER — APPOINTMENT (OUTPATIENT)
Dept: CT IMAGING | Age: 83
DRG: 467 | End: 2018-01-30
Attending: PHYSICIAN ASSISTANT
Payer: MEDICARE

## 2018-01-30 ENCOUNTER — HOSPITAL ENCOUNTER (INPATIENT)
Age: 83
LOS: 5 days | Discharge: NURSING, REHAB OR LONG-TERM CARE | DRG: 467 | End: 2018-02-05
Attending: EMERGENCY MEDICINE | Admitting: ORTHOPAEDIC SURGERY
Payer: MEDICARE

## 2018-01-30 DIAGNOSIS — M97.01XA PERIPROSTHETIC FRACTURE AROUND INTERNAL PROSTHETIC RIGHT HIP JOINT, INITIAL ENCOUNTER (HCC): ICD-10-CM

## 2018-01-30 DIAGNOSIS — S72.324A CLOSED NONDISPLACED TRANSVERSE FRACTURE OF SHAFT OF RIGHT FEMUR, INITIAL ENCOUNTER (HCC): Primary | ICD-10-CM

## 2018-01-30 PROBLEM — Z96.649 PERI-PROSTHETIC FRACTURE OF FEMUR FOLLOWING TOTAL HIP ARTHROPLASTY: Status: ACTIVE | Noted: 2018-01-30

## 2018-01-30 PROBLEM — M97.8XXA PERI-PROSTHETIC FRACTURE OF FEMUR FOLLOWING TOTAL HIP ARTHROPLASTY: Status: ACTIVE | Noted: 2018-01-30

## 2018-01-30 LAB
ANION GAP SERPL CALC-SCNC: 8 MMOL/L (ref 5–15)
APPEARANCE UR: CLEAR
BACTERIA URNS QL MICRO: NEGATIVE /HPF
BASOPHILS # BLD: 0.1 K/UL (ref 0–0.1)
BASOPHILS NFR BLD: 1 % (ref 0–1)
BILIRUB UR QL: NEGATIVE
BUN SERPL-MCNC: 14 MG/DL (ref 6–20)
BUN/CREAT SERPL: 10 (ref 12–20)
CALCIUM SERPL-MCNC: 9.4 MG/DL (ref 8.5–10.1)
CHLORIDE SERPL-SCNC: 102 MMOL/L (ref 97–108)
CO2 SERPL-SCNC: 29 MMOL/L (ref 21–32)
COLOR UR: NORMAL
CREAT SERPL-MCNC: 1.35 MG/DL (ref 0.7–1.3)
DIFFERENTIAL METHOD BLD: ABNORMAL
EOSINOPHIL # BLD: 0.2 K/UL (ref 0–0.4)
EOSINOPHIL NFR BLD: 2 % (ref 0–7)
EPITH CASTS URNS QL MICRO: NORMAL /LPF
ERYTHROCYTE [DISTWIDTH] IN BLOOD BY AUTOMATED COUNT: 14.3 % (ref 11.5–14.5)
GLUCOSE SERPL-MCNC: 135 MG/DL (ref 65–100)
GLUCOSE UR STRIP.AUTO-MCNC: NEGATIVE MG/DL
HCT VFR BLD AUTO: 38.7 % (ref 36.6–50.3)
HGB BLD-MCNC: 12.2 G/DL (ref 12.1–17)
HGB UR QL STRIP: NEGATIVE
HYALINE CASTS URNS QL MICRO: NORMAL /LPF (ref 0–5)
IMM GRANULOCYTES # BLD: 0.1 K/UL (ref 0–0.04)
IMM GRANULOCYTES NFR BLD AUTO: 1 % (ref 0–0.5)
KETONES UR QL STRIP.AUTO: NEGATIVE MG/DL
LEUKOCYTE ESTERASE UR QL STRIP.AUTO: NEGATIVE
LYMPHOCYTES # BLD: 1 K/UL (ref 0.8–3.5)
LYMPHOCYTES NFR BLD: 7 % (ref 12–49)
MCH RBC QN AUTO: 28.2 PG (ref 26–34)
MCHC RBC AUTO-ENTMCNC: 31.5 G/DL (ref 30–36.5)
MCV RBC AUTO: 89.4 FL (ref 80–99)
MONOCYTES # BLD: 0.9 K/UL (ref 0–1)
MONOCYTES NFR BLD: 7 % (ref 5–13)
NEUTS SEG # BLD: 10.7 K/UL (ref 1.8–8)
NEUTS SEG NFR BLD: 82 % (ref 32–75)
NITRITE UR QL STRIP.AUTO: NEGATIVE
NRBC # BLD: 0 K/UL (ref 0–0.01)
NRBC BLD-RTO: 0 PER 100 WBC
PH UR STRIP: 6 [PH] (ref 5–8)
PLATELET # BLD AUTO: 344 K/UL (ref 150–400)
PMV BLD AUTO: 10 FL (ref 8.9–12.9)
POTASSIUM SERPL-SCNC: 4.3 MMOL/L (ref 3.5–5.1)
PROT UR STRIP-MCNC: NEGATIVE MG/DL
RBC # BLD AUTO: 4.33 M/UL (ref 4.1–5.7)
RBC #/AREA URNS HPF: NORMAL /HPF (ref 0–5)
SODIUM SERPL-SCNC: 139 MMOL/L (ref 136–145)
SP GR UR REFRACTOMETRY: 1.02 (ref 1–1.03)
UR CULT HOLD, URHOLD: NORMAL
UROBILINOGEN UR QL STRIP.AUTO: 0.2 EU/DL (ref 0.2–1)
WBC # BLD AUTO: 13 K/UL (ref 4.1–11.1)
WBC URNS QL MICRO: NORMAL /HPF (ref 0–4)

## 2018-01-30 PROCEDURE — 71275 CT ANGIOGRAPHY CHEST: CPT

## 2018-01-30 PROCEDURE — 71045 X-RAY EXAM CHEST 1 VIEW: CPT

## 2018-01-30 PROCEDURE — 73560 X-RAY EXAM OF KNEE 1 OR 2: CPT

## 2018-01-30 PROCEDURE — 73562 X-RAY EXAM OF KNEE 3: CPT

## 2018-01-30 PROCEDURE — 96361 HYDRATE IV INFUSION ADD-ON: CPT

## 2018-01-30 PROCEDURE — 96365 THER/PROPH/DIAG IV INF INIT: CPT

## 2018-01-30 PROCEDURE — 73502 X-RAY EXAM HIP UNI 2-3 VIEWS: CPT

## 2018-01-30 PROCEDURE — 93005 ELECTROCARDIOGRAM TRACING: CPT

## 2018-01-30 PROCEDURE — 96372 THER/PROPH/DIAG INJ SC/IM: CPT

## 2018-01-30 PROCEDURE — 94762 N-INVAS EAR/PLS OXIMTRY CONT: CPT

## 2018-01-30 PROCEDURE — 85025 COMPLETE CBC W/AUTO DIFF WBC: CPT | Performed by: EMERGENCY MEDICINE

## 2018-01-30 PROCEDURE — 99218 HC RM OBSERVATION: CPT

## 2018-01-30 PROCEDURE — 80048 BASIC METABOLIC PNL TOTAL CA: CPT | Performed by: EMERGENCY MEDICINE

## 2018-01-30 PROCEDURE — 72192 CT PELVIS W/O DYE: CPT

## 2018-01-30 PROCEDURE — 74011250637 HC RX REV CODE- 250/637: Performed by: PHYSICIAN ASSISTANT

## 2018-01-30 PROCEDURE — 96376 TX/PRO/DX INJ SAME DRUG ADON: CPT

## 2018-01-30 PROCEDURE — 96375 TX/PRO/DX INJ NEW DRUG ADDON: CPT

## 2018-01-30 PROCEDURE — 36415 COLL VENOUS BLD VENIPUNCTURE: CPT | Performed by: EMERGENCY MEDICINE

## 2018-01-30 PROCEDURE — 81001 URINALYSIS AUTO W/SCOPE: CPT | Performed by: EMERGENCY MEDICINE

## 2018-01-30 PROCEDURE — 74011250636 HC RX REV CODE- 250/636: Performed by: EMERGENCY MEDICINE

## 2018-01-30 PROCEDURE — 74011250636 HC RX REV CODE- 250/636: Performed by: PHYSICIAN ASSISTANT

## 2018-01-30 PROCEDURE — 74011636320 HC RX REV CODE- 636/320: Performed by: RADIOLOGY

## 2018-01-30 PROCEDURE — 99285 EMERGENCY DEPT VISIT HI MDM: CPT

## 2018-01-30 RX ORDER — MONTELUKAST SODIUM 10 MG/1
10 TABLET ORAL EVERY EVENING
Status: DISCONTINUED | OUTPATIENT
Start: 2018-01-30 | End: 2018-02-05 | Stop reason: HOSPADM

## 2018-01-30 RX ORDER — HYDROMORPHONE HYDROCHLORIDE 2 MG/ML
1 INJECTION, SOLUTION INTRAMUSCULAR; INTRAVENOUS; SUBCUTANEOUS
Status: DISCONTINUED | OUTPATIENT
Start: 2018-01-30 | End: 2018-02-05 | Stop reason: HOSPADM

## 2018-01-30 RX ORDER — VANCOMYCIN/0.9 % SOD CHLORIDE 1.5G/250ML
1500 PLASTIC BAG, INJECTION (ML) INTRAVENOUS EVERY 24 HOURS
Status: DISCONTINUED | OUTPATIENT
Start: 2018-02-01 | End: 2018-01-31 | Stop reason: DRUGHIGH

## 2018-01-30 RX ORDER — TRAMADOL HYDROCHLORIDE 50 MG/1
50 TABLET ORAL
Status: DISCONTINUED | OUTPATIENT
Start: 2018-01-30 | End: 2018-02-05 | Stop reason: HOSPADM

## 2018-01-30 RX ORDER — ACETAMINOPHEN 500 MG
1000 TABLET ORAL
Status: DISCONTINUED | OUTPATIENT
Start: 2018-01-30 | End: 2018-02-05 | Stop reason: HOSPADM

## 2018-01-30 RX ORDER — HYDROMORPHONE HYDROCHLORIDE 2 MG/ML
0.5 INJECTION, SOLUTION INTRAMUSCULAR; INTRAVENOUS; SUBCUTANEOUS
Status: DISCONTINUED | OUTPATIENT
Start: 2018-01-30 | End: 2018-02-05 | Stop reason: HOSPADM

## 2018-01-30 RX ORDER — FENTANYL CITRATE 50 UG/ML
50 INJECTION, SOLUTION INTRAMUSCULAR; INTRAVENOUS
Status: COMPLETED | OUTPATIENT
Start: 2018-01-30 | End: 2018-01-30

## 2018-01-30 RX ORDER — ERGOCALCIFEROL 1.25 MG/1
50000 CAPSULE ORAL
COMMUNITY

## 2018-01-30 RX ORDER — FLUOXETINE HYDROCHLORIDE 20 MG/1
20 CAPSULE ORAL DAILY
Status: DISCONTINUED | OUTPATIENT
Start: 2018-01-31 | End: 2018-02-05 | Stop reason: HOSPADM

## 2018-01-30 RX ORDER — ACETAMINOPHEN 500 MG
500-1000 TABLET ORAL
Status: DISCONTINUED | OUTPATIENT
Start: 2018-01-30 | End: 2018-01-30

## 2018-01-30 RX ORDER — LORATADINE 10 MG/1
10 TABLET ORAL DAILY
Status: DISCONTINUED | OUTPATIENT
Start: 2018-01-31 | End: 2018-01-30

## 2018-01-30 RX ORDER — METOCLOPRAMIDE 10 MG/1
10 TABLET ORAL
Status: DISCONTINUED | OUTPATIENT
Start: 2018-01-30 | End: 2018-02-05 | Stop reason: HOSPADM

## 2018-01-30 RX ORDER — SODIUM CHLORIDE 9 MG/ML
50 INJECTION, SOLUTION INTRAVENOUS CONTINUOUS
Status: DISCONTINUED | OUTPATIENT
Start: 2018-01-30 | End: 2018-02-05 | Stop reason: HOSPADM

## 2018-01-30 RX ORDER — HEPARIN SODIUM 5000 [USP'U]/ML
5000 INJECTION, SOLUTION INTRAVENOUS; SUBCUTANEOUS ONCE
Status: COMPLETED | OUTPATIENT
Start: 2018-01-30 | End: 2018-01-30

## 2018-01-30 RX ORDER — SIMVASTATIN 20 MG/1
40 TABLET, FILM COATED ORAL
Status: DISCONTINUED | OUTPATIENT
Start: 2018-01-30 | End: 2018-02-05 | Stop reason: HOSPADM

## 2018-01-30 RX ORDER — CEFAZOLIN SODIUM IN 0.9 % NACL 2 G/50 ML
2 INTRAVENOUS SOLUTION, PIGGYBACK (ML) INTRAVENOUS EVERY 8 HOURS
Status: DISCONTINUED | OUTPATIENT
Start: 2018-01-30 | End: 2018-01-31

## 2018-01-30 RX ORDER — DIPHENHYDRAMINE HCL 25 MG
25 CAPSULE ORAL
Status: DISCONTINUED | OUTPATIENT
Start: 2018-01-30 | End: 2018-02-05 | Stop reason: HOSPADM

## 2018-01-30 RX ORDER — LORAZEPAM 0.5 MG/1
0.5 TABLET ORAL
Status: DISCONTINUED | OUTPATIENT
Start: 2018-01-30 | End: 2018-02-05 | Stop reason: HOSPADM

## 2018-01-30 RX ORDER — CHOLECALCIFEROL (VITAMIN D3) 125 MCG
1 CAPSULE ORAL DAILY
Status: DISCONTINUED | OUTPATIENT
Start: 2018-01-31 | End: 2018-01-31

## 2018-01-30 RX ORDER — EZETIMIBE 10 MG/1
10 TABLET ORAL DAILY
Status: DISCONTINUED | OUTPATIENT
Start: 2018-01-31 | End: 2018-02-05 | Stop reason: HOSPADM

## 2018-01-30 RX ORDER — BUSPIRONE HYDROCHLORIDE 5 MG/1
10 TABLET ORAL 2 TIMES DAILY
Status: DISCONTINUED | OUTPATIENT
Start: 2018-01-31 | End: 2018-02-05 | Stop reason: HOSPADM

## 2018-01-30 RX ORDER — GABAPENTIN 300 MG/1
300 CAPSULE ORAL 3 TIMES DAILY
Status: DISCONTINUED | OUTPATIENT
Start: 2018-01-30 | End: 2018-02-05 | Stop reason: HOSPADM

## 2018-01-30 RX ORDER — NALOXONE HYDROCHLORIDE 0.4 MG/ML
0.2 INJECTION, SOLUTION INTRAMUSCULAR; INTRAVENOUS; SUBCUTANEOUS
Status: DISCONTINUED | OUTPATIENT
Start: 2018-01-30 | End: 2018-01-31 | Stop reason: SDUPTHER

## 2018-01-30 RX ORDER — ONDANSETRON 2 MG/ML
4 INJECTION INTRAMUSCULAR; INTRAVENOUS
Status: DISCONTINUED | OUTPATIENT
Start: 2018-01-30 | End: 2018-02-05 | Stop reason: HOSPADM

## 2018-01-30 RX ORDER — ACETAMINOPHEN 500 MG
500 TABLET ORAL
Status: DISCONTINUED | OUTPATIENT
Start: 2018-01-30 | End: 2018-02-05 | Stop reason: HOSPADM

## 2018-01-30 RX ADMIN — HYDROMORPHONE HYDROCHLORIDE 1 MG: 2 INJECTION INTRAMUSCULAR; INTRAVENOUS; SUBCUTANEOUS at 19:28

## 2018-01-30 RX ADMIN — SODIUM CHLORIDE 500 ML: 900 INJECTION, SOLUTION INTRAVENOUS at 17:40

## 2018-01-30 RX ADMIN — METOCLOPRAMIDE HYDROCHLORIDE 10 MG: 10 TABLET ORAL at 21:19

## 2018-01-30 RX ADMIN — TRAMADOL HYDROCHLORIDE 50 MG: 50 TABLET, FILM COATED ORAL at 22:26

## 2018-01-30 RX ADMIN — VANCOMYCIN HYDROCHLORIDE 1750 MG: 10 INJECTION, POWDER, LYOPHILIZED, FOR SOLUTION INTRAVENOUS at 20:45

## 2018-01-30 RX ADMIN — CEFAZOLIN 2 G: 1 INJECTION, POWDER, FOR SOLUTION INTRAMUSCULAR; INTRAVENOUS; PARENTERAL at 20:02

## 2018-01-30 RX ADMIN — FENTANYL CITRATE 50 MCG: 50 INJECTION, SOLUTION INTRAMUSCULAR; INTRAVENOUS at 17:39

## 2018-01-30 RX ADMIN — HYDROMORPHONE HYDROCHLORIDE 1 MG: 2 INJECTION INTRAMUSCULAR; INTRAVENOUS; SUBCUTANEOUS at 22:26

## 2018-01-30 RX ADMIN — IOPAMIDOL 80 ML: 755 INJECTION, SOLUTION INTRAVENOUS at 19:15

## 2018-01-30 RX ADMIN — HEPARIN SODIUM 5000 UNITS: 5000 INJECTION, SOLUTION INTRAVENOUS; SUBCUTANEOUS at 21:19

## 2018-01-30 RX ADMIN — GABAPENTIN 300 MG: 300 CAPSULE ORAL at 21:19

## 2018-01-30 RX ADMIN — SIMVASTATIN 40 MG: 20 TABLET, FILM COATED ORAL at 21:19

## 2018-01-30 RX ADMIN — MONTELUKAST SODIUM 10 MG: 10 TABLET, FILM COATED ORAL at 21:19

## 2018-01-30 RX ADMIN — SODIUM CHLORIDE 50 ML/HR: 900 INJECTION, SOLUTION INTRAVENOUS at 19:02

## 2018-01-30 RX ADMIN — FENTANYL CITRATE 50 MCG: 50 INJECTION, SOLUTION INTRAMUSCULAR; INTRAVENOUS at 15:59

## 2018-01-30 NOTE — H&P
Orthopedic PRE-OP Admission History and Physical        NAME: Joseph Boateng       :  1935       MRN:  [id number]      Subjective:     Patient is a 81 y.o.  male who presents with history of depression, hypertension, neuropathy, GERD, osteoarthritis, prostate cancer, s/p right MARYJANE, and insomnia who presented to the ER [facility] after experiencing severe right hip pain. He was seen by Dr. Jenn Lowe PA today in the office and when he arrived home he felt a pop in his right hip. He was unable to bear weight following this and was brought to [facility] via EMS. He had a right MARYJANE anterior approach done on  without complication. He was slated to do physical therapy and was apparently doing well following surgery. He was not complaining of pain prior to this episode today.   He has been having some episodes of SOB and his family states he was having trouble when he ambulates with increasing     Patient Active Problem List    Diagnosis Date Noted    Primary osteoarthritis of right hip 01/15/2018    S/P total hip arthroplasty 01/15/2018    Renal calculus     Hiatal hernia     Depression     Arthritis     Anxiety state     Osteoarthritis 10/20/2015    Primary osteoarthritis of left knee 10/20/2015    Primary localized osteoarthrosis, lower leg 10/20/2015    Hypertension 10/20/2015    Hyperlipidemia 10/20/2015    Status post joint replacement 2014    Right knee DJD 2014    Osteoarthrosis, unspecified whether generalized or localized, lower leg 2014    Meniscus tear 2013    Neuropathy, lower extremity 2011    Unspecified vitamin D deficiency 2011    Other dyspnea and respiratory abnormality 2010    Hypercholesteremia 2009    Insomnia 2009    GERD (gastroesophageal reflux disease) 2009    Allergic rhinitis 2009    Anxiety 2009    OA (osteoarthritis) 2009    [facility]    Cancer ([facility] 01/01/2001     Past Medical History:   Diagnosis Date    Anxiety state, unspecified     Arthritis     Cancer ([facility] 2001    prostate,     Depression     GERD (gastroesophageal reflux disease)     Hiatal hernia     Hypercholesteremia     Renal calculus     Seasonal allergic rhinitis       Past Surgical History:   Procedure Laterality Date    ENDOSCOPY, COLON, DIAGNOSTIC      HX APPENDECTOMY      HX CATARACT REMOVAL  2012    HX COLONOSCOPY      HX ENDOSCOPY      HX GI      colonoscopy    HX KNEE REPLACEMENT  02/18/14    right total knee    HX KNEE REPLACEMENT Left 10/2015    HX LITHOTRIPSY  11/2011    HX ORTHOPAEDIC  1992    rt. carpal tunnel    HX PROSTATECTOMY  2003    bowel incontinence since surgery    HX TONSILLECTOMY        Prior to Admission medications    Medication Sig Start Date End Date Taking? Authorizing Provider   aspirin delayed-release 325 mg tablet Take 1 Tab by mouth two (2) times a day. 1/15/18  Yes Mel Bonilla MD   oxyCODONE IR (ROXICODONE) 5 mg immediate release tablet Take 1-2 Tabs by mouth every four (4) hours as needed for Pain. Max Daily Amount: 60 mg. Indications: Pain 1/15/18  Yes Mel Bonilla MD   traMADol Bary Kocher) 50 mg tablet Take 1 Tab by mouth every six (6) hours as needed for Pain (Take for breakthrough pain if Oxycodone is not working). Max Daily Amount: 200 mg. Indications: Pain, Post-op Pain, Diagnosis Hip and Knee Arthritis ICD 10 - M16.9 1/15/18  Yes Mel Bonilla MD   acetaminophen (TYLENOL EXTRA STRENGTH) 500 mg tablet Take 1-2 Tabs by mouth every six (6) hours as needed for Pain. Not to exceed 4,000mg in any 24 hour period  Indications: Pain 1/15/18  Yes Mel Bonilla MD   busPIRone (BUSPAR) 10 mg tablet Take 10 mg by mouth daily. Yes Historical Provider   RABEprazole (ACIPHEX) 20 mg tablet Take 20 mg by mouth daily. Yes Historical Provider   montelukast (SINGULAIR) 10 mg tablet Take 10 mg by mouth every evening.    Yes Historical Provider gabapentin (NEURONTIN) 300 mg capsule Take 300 mg by mouth three (3) times daily. Yes Historical Provider   ezetimibe (ZETIA) 10 mg tablet Take 10 mg by mouth daily. Yes Mouna Roberson MD   fluoxetine (PROZAC) 20 mg capsule Take 1 capsule by mouth daily. 2/71/17  Yes Natasha Hummel MD   simvastatin (ZOCOR) 40 mg tablet Take 1 tablet by mouth nightly. 4/68/83  Yes Natasha Hummel MD   lorazepam (ATIVAN) 0.5 mg tablet Take 1 tablet by mouth two (2) times daily as needed for Anxiety. 0/59/21  Yes Natasha Hummel MD   loratadine (CLARITIN) 10 mg tablet Take 1 Tab by mouth daily. 2/18/02  Yes Natasha Hummel MD   cholecalciferol, vitamin D3, (VITAMIN D3) 2,000 unit Tab Take 1 Tab by mouth daily. 0/4/36  Yes Natasha Hummel MD   metoclopramide HCl (REGLAN) 10 mg tablet Take 1 Tab by mouth Before breakfast, lunch, dinner and at bedtime. 5/7/44  Yes Natasha Hummel MD   ondansetron (ZOFRAN ODT) 8 mg disintegrating tablet Take 0.5 Tabs by mouth every eight (8) hours as needed for Nausea. 1/15/18   Carlos Mendiola MD     Current Facility-Administered Medications   Medication Dose Route Frequency    sodium chloride 0.9 % bolus infusion 500 mL  500 mL IntraVENous ONCE     Current Outpatient Prescriptions   Medication Sig    aspirin delayed-release 325 mg tablet Take 1 Tab by mouth two (2) times a day.  oxyCODONE IR (ROXICODONE) 5 mg immediate release tablet Take 1-2 Tabs by mouth every four (4) hours as needed for Pain. Max Daily Amount: 60 mg. Indications: Pain    traMADol (ULTRAM) 50 mg tablet Take 1 Tab by mouth every six (6) hours as needed for Pain (Take for breakthrough pain if Oxycodone is not working). Max Daily Amount: 200 mg. Indications: Pain, Post-op Pain, Diagnosis Hip and Knee Arthritis ICD 10 - M16.9    acetaminophen (TYLENOL EXTRA STRENGTH) 500 mg tablet Take 1-2 Tabs by mouth every six (6) hours as needed for Pain.  Not to exceed 4,000mg in any 24 hour period  Indications: Pain    busPIRone (BUSPAR) 10 mg tablet Take 10 mg by mouth daily.  RABEprazole (ACIPHEX) 20 mg tablet Take 20 mg by mouth daily.  montelukast (SINGULAIR) 10 mg tablet Take 10 mg by mouth every evening.  gabapentin (NEURONTIN) 300 mg capsule Take 300 mg by mouth three (3) times daily.  ezetimibe (ZETIA) 10 mg tablet Take 10 mg by mouth daily.  fluoxetine (PROZAC) 20 mg capsule Take 1 capsule by mouth daily.  simvastatin (ZOCOR) 40 mg tablet Take 1 tablet by mouth nightly.  lorazepam (ATIVAN) 0.5 mg tablet Take 1 tablet by mouth two (2) times daily as needed for Anxiety.  loratadine (CLARITIN) 10 mg tablet Take 1 Tab by mouth daily.  cholecalciferol, vitamin D3, (VITAMIN D3) 2,000 unit Tab Take 1 Tab by mouth daily.  metoclopramide HCl (REGLAN) 10 mg tablet Take 1 Tab by mouth Before breakfast, lunch, dinner and at bedtime.  ondansetron (ZOFRAN ODT) 8 mg disintegrating tablet Take 0.5 Tabs by mouth every eight (8) hours as needed for Nausea. Allergies   Allergen Reactions    Codeine Nausea and Vomiting    Flu Vac 2015 (65 Up)-Mf59c(Pf) Nausea Only    Fluvirin [id number] Other (comments)     GI upset    Naprosyn [Naproxen] Nausea and Vomiting     Gas        Social History   Substance Use Topics    Smoking status: Never Smoker    Smokeless tobacco: Never Used    Alcohol use No      Family History   Problem Relation Age of Onset    Heart Disease Mother     Stroke Mother     Cancer Father      COLON    Arthritis-rheumatoid Neg Hx     Malignant Hyperthermia Neg Hx     Pseudocholinesterase Deficiency Neg Hx     Delayed Awakening Neg Hx     Post-op Nausea/Vomiting Neg Hx     Post-op Cognitive Dysfunction Neg Hx     Emergence Delirium Neg Hx         Review of Systems  A comprehensive review of systems was negative except for that written in the HPI.     Mental Status: no dementia    Objective:     Patient Vitals for the past 8 hrs:   BP Temp Pulse Resp SpO2 Height Weight   01/30/18 1815 164/75 - 92 21 (!) 82 % - -   18 1800 164/74 - 92 17 95 % - -   18 1745 176/68 - 83 17 90 % - -   18 1730 165/73 - - 21 93 % - -   18 1715 165/73 - 82 18 90 % - -   18 1700 167/69 - - 22 (!) 87 % - -   18 1645 154/65 - - 18 (!) 89 % - -   18 1600 150/74 - - - 91 % - -   18 1550 - - - - 95 % - -   18 1545 (!) 171/94 97.1 °F (36.2 °C) 91 18 (!) 84 % 5' 7\" (1.702 m) 94.3 kg (208 lb)     Temp (24hrs), Av.1 °F (36.2 °C), Min:97.1 °F (36.2 °C), Max:97.1 °F (36.2 °C)      Gen: Well-developed, obese, in no acute distress   HEENT: Pink conjunctivae, PERRL, hearing intact to voice, moist mucous membranes   Neck: Supple, without masses, thyroid non-tender   Resp: No accessory muscle use, clear breath sounds without wheezes rales or rhonchi   Card: No murmurs, normal S1, S2 without thrills, bruits or peripheral edema   Abd: Soft, non-tender, non-distended, normoactive bowel sounds are present, no palpable organomegaly and no detectable hernias   Lymph: No cervical or inguinal adenopathy   Musc: No cyanosis or clubbing   Skin:  Clean, dry, intact. No rash, ulcerations. Skin turgor good  Neuro: Cranial nerves are grossly intact, no focal motor weakness, follows commands appropriately   Psych: Good insight, oriented to person, place and time, alert                Imaging Review    Imaging Review:  IMPRESSION  IMPRESSION:  There is an acute fracture of the proximal shaft of the right femur  as described. .    Labs:   Recent Results (from the past 24 hour(s))   CBC WITH AUTOMATED DIFF    Collection Time: 18  4:32 PM   Result Value Ref Range    WBC 13.0 (H) 4.1 - 11.1 K/uL    RBC 4.33 4.10 - 5.70 M/uL    HGB 12.2 12.1 - 17.0 g/dL    HCT 38.7 36.6 - 50.3 %    MCV 89.4 80.0 - 99.0 FL    MCH 28.2 26.0 - 34.0 PG    MCHC 31.5 30.0 - 36.5 g/dL    RDW 14.3 11.5 - 14.5 %    PLATELET 191 637 - 824 K/uL    MPV 10.0 8.9 - 12.9 FL    NRBC 0.0 0  WBC    ABSOLUTE NRBC 0.00 0.00 - 0.01 K/uL NEUTROPHILS 82 (H) 32 - 75 %    LYMPHOCYTES 7 (L) 12 - 49 %    MONOCYTES 7 5 - 13 %    EOSINOPHILS 2 0 - 7 %    BASOPHILS 1 0 - 1 %    IMMATURE GRANULOCYTES 1 (H) 0.0 - 0.5 %    ABS. NEUTROPHILS 10.7 (H) 1.8 - 8.0 K/UL    ABS. LYMPHOCYTES 1.0 0.8 - 3.5 K/UL    ABS. MONOCYTES 0.9 0.0 - 1.0 K/UL    ABS. EOSINOPHILS 0.2 0.0 - 0.4 K/UL    ABS. BASOPHILS 0.1 0.0 - 0.1 K/UL    ABS. IMM.  GRANS. 0.1 (H) 0.00 - 0.04 K/UL    DF AUTOMATED     METABOLIC PANEL, BASIC    Collection Time: 01/30/18  4:32 PM   Result Value Ref Range    Sodium 139 136 - 145 mmol/L    Potassium 4.3 3.5 - 5.1 mmol/L    Chloride 102 97 - 108 mmol/L    CO2 29 21 - 32 mmol/L    Anion gap 8 5 - 15 mmol/L    Glucose 135 (H) 65 - 100 mg/dL    BUN 14 6 - 20 MG/DL    Creatinine 1.35 (H) 0.70 - 1.30 MG/DL    BUN/Creatinine ratio 10 (L) 12 - 20      GFR est AA >60 >60 ml/min/1.73m2    GFR est non-AA 51 (L) >60 ml/min/1.73m2    Calcium 9.4 8.5 - 10.1 MG/DL   URINALYSIS W/MICROSCOPIC    Collection Time: 01/30/18  5:44 PM   Result Value Ref Range    Color YELLOW/STRAW      Appearance CLEAR CLEAR      Specific gravity 1.018 1.003 - 1.030      pH (UA) 6.0 5.0 - 8.0      Protein NEGATIVE  NEG mg/dL    Glucose NEGATIVE  NEG mg/dL    Ketone NEGATIVE  NEG mg/dL    Bilirubin NEGATIVE  NEG      Blood NEGATIVE  NEG      Urobilinogen 0.2 0.2 - 1.0 EU/dL    Nitrites NEGATIVE  NEG      Leukocyte Esterase NEGATIVE  NEG      WBC 0-4 0 - 4 /hpf    RBC 0-5 0 - 5 /hpf    Epithelial cells FEW FEW /lpf    Bacteria NEGATIVE  NEG /hpf    Hyaline cast 0-2 0 - 5 /lpf   URINE CULTURE HOLD SAMPLE    Collection Time: 01/30/18  5:44 PM   Result Value Ref Range    Urine culture hold URINE ON HOLD IN MICROBIOLOGY DEPT FOR 3 DAYS         Assessment:     Patient Active Problem List    Diagnosis Date Noted    Primary osteoarthritis of right hip 01/15/2018    S/P total hip arthroplasty 01/15/2018    Renal calculus     Hiatal hernia     Depression     Arthritis     Anxiety state     Osteoarthritis 10/20/2015    Primary osteoarthritis of left knee 10/20/2015    Primary localized osteoarthrosis, lower leg 10/20/2015    Hypertension 10/20/2015    Hyperlipidemia 10/20/2015    Status post joint replacement 03/18/2014    Right knee DJD 02/18/2014    Osteoarthrosis, unspecified whether generalized or localized, lower leg 01/16/2014    Meniscus tear 04/26/2013    Neuropathy, lower extremity 12/26/2011    Unspecified vitamin D deficiency 04/26/2011    Other dyspnea and respiratory abnormality 09/28/2010    Hypercholesteremia 07/06/2009    Insomnia 07/06/2009    GERD (gastroesophageal reflux disease) 07/06/2009    Allergic rhinitis 07/06/2009    Anxiety 07/06/2009    OA (osteoarthritis) 07/06/2009    Prostate [facility] ([facility] 07/06/2009    Cancer ([facility] 01/01/2001         Plan:     Pt. NPO after midnight  IV analgesia ordered  IV antibiotics ordered  NWB right leg  Vitals per routine  OR tomorrow afternoon for ORIF of right femur  CT of right femur/pelvis  CM consult      NABEEL Rader   Orthopaedic Surgery PA  [address] [place] [address]

## 2018-01-30 NOTE — IP AVS SNAPSHOT
[address] 
[phone] Patient: Kelsy Dumont MRN: [id number] NJQ:[id number] A check pablo indicates which time of day the medication should be taken. My Medications START taking these medications Instructions Each Dose to Equal  
 Morning Noon Evening Bedtime  
 cefUROXime 500 mg tablet Commonly known as:  CEFTIN Take 1 Tab by mouth two (2) times a day. Indications: Skin and Skin Structure Infection 500 mg CHANGE how you take these medications Instructions Each Dose to Equal  
 Morning Noon Evening Bedtime * traMADol 50 mg tablet Commonly known as:  ULTRAM  
What changed:  Another medication with the same name was added. Make sure you understand how and when to take each. Your last dose was:  2/5/18  09:00 Take 1 Tab by mouth every six (6) hours as needed for Pain (Take for breakthrough pain if Oxycodone is not working). Max Daily Amount: 200 mg. Indications: Pain, Post-op Pain, Diagnosis Hip and Knee Arthritis ICD 10 - M16.9  
 50 mg  
    
   
   
   
  
 * traMADol 50 mg tablet Commonly known as:  ULTRAM  
What changed: You were already taking a medication with the same name, and this prescription was added. Make sure you understand how and when to take each. Take 1 Tab by mouth every six (6) hours as needed for Pain. Max Daily Amount: 200 mg.  
 50 mg  
    
   
   
   
  
 * Notice: This list has 2 medication(s) that are the same as other medications prescribed for you. Read the directions carefully, and ask your doctor or other care provider to review them with you. CONTINUE taking these medications Instructions Each Dose to Equal  
 Morning Noon Evening Bedtime  
 aspirin delayed-release 325 mg tablet Take 1 Tab by mouth two (2) times a day. 325 mg  
    
   
   
   
  
 busPIRone 10 mg tablet Commonly known as:  BUSPAR Your last dose was:  2/5/18  09:00 Take 10 mg by mouth two (2) times a day. 10 mg  
    
   
   
   
  
 ergocalciferol 50,000 unit capsule Commonly known as:  ERGOCALCIFEROL Take 50,000 Units by mouth Every Saturday. 51404 Units FLUoxetine 20 mg capsule Commonly known as:  PROzac Your last dose was:  2/5/18  09:00 Take 1 capsule by mouth daily. 20 mg  
    
   
   
   
  
 gabapentin 300 mg capsule Commonly known as:  NEURONTIN Your last dose was:  2/5/18  09;00 Take 300 mg by mouth three (3) times daily. 300 mg  
    
   
   
   
due LORazepam 0.5 mg tablet Commonly known as:  ATIVAN Take 1 tablet by mouth two (2) times daily as needed for Anxiety. 0.5 mg  
    
   
   
   
  
 metoclopramide HCl 10 mg tablet Commonly known as:  REGLAN Your last dose was:  2/5/18  11:40 Your next dose is:  Dinner the Bedtime Take 1 Tab by mouth Before breakfast, lunch, dinner and at bedtime. 10 mg  
    
   
   
  
   
  
  
 montelukast 10 mg tablet Commonly known as:  SINGULAIR Your last dose was:  2/4/18 Take 10 mg by mouth every evening. 10 mg  
    
   
   
   
  
  
 ondansetron 8 mg disintegrating tablet Commonly known as:  ZOFRAN ODT Take 0.5 Tabs by mouth every eight (8) hours as needed for Nausea. 4 mg RABEprazole 20 mg tablet Commonly known as:  ACIPHEX Take 20 mg by mouth daily. 20 mg  
    
   
   
   
  
 simvastatin 40 mg tablet Commonly known as:  ZOCOR Your last dose was:  2/4/18  PM  
   
 Take 1 tablet by mouth nightly. 40 mg  
    
   
   
   
  
  
 ZETIA 10 mg tablet Generic drug:  ezetimibe Your last dose was:  2/5/18  09:00 Take 10 mg by mouth daily. 10 mg  
    
   
   
   
  
  
STOP taking these medications   
 oxyCODONE IR 5 mg immediate release tablet Commonly known as:  Ethel Sandoval  
 Where to Get Your Medications Information on where to get these meds will be given to you by the nurse or doctor. ! Ask your nurse or doctor about these medications  
  cefUROXime 500 mg tablet  
 traMADol 50 mg tablet

## 2018-01-30 NOTE — ED PROVIDER NOTES
HPI Comments: 81 y.o. male with past medical history significant for GERD, HLD, prostate cancer, and right hip replacement by Dr. [place] Ga on 1/15/2018 who presents from home via EMS with chief complaint of right hip pain since approximately 1400 today. Per EMS, pt was transferring from car to wheelchair when family members heard a \"pop. \" Pt did not fall. Pt received 100 of fentanyl en route and rates current pain as 5/10. He denies any fever. His most recent PO intake was 1400 today. He is unsure what pain medications he takes at home. There are no other acute medical concerns at this time. Social hx: never smoker, no alcohol or drug use  PCP: Edison Pizano MD    Note written by Trevon Meadows. [facility] Center[facility]achelle, as dictated by Laurent Veronica MD 3:45 PM      The history is provided by the patient. No  was used.         Past Medical History:   Diagnosis Date    Anxiety state, unspecified     Arthritis     Cancer ([facility] [address] 2001    prostate,     Depression     GERD (gastroesophageal reflux disease)     Hiatal hernia     Hypercholesteremia     Renal calculus     Seasonal allergic rhinitis        Past Surgical History:   Procedure Laterality Date    ENDOSCOPY, COLON, DIAGNOSTIC      HX APPENDECTOMY      HX CATARACT REMOVAL  2012    HX COLONOSCOPY      HX ENDOSCOPY      HX GI      colonoscopy    HX KNEE REPLACEMENT  02/18/14    right total knee    HX KNEE REPLACEMENT Left 10/2015    HX LITHOTRIPSY  11/2011    HX ORTHOPAEDIC  1992    rt. carpal tunnel    HX PROSTATECTOMY  2003    bowel incontinence since surgery    HX TONSILLECTOMY           Family History:   Problem Relation Age of Onset    Heart Disease Mother     Stroke Mother     Cancer Father      COLON    Arthritis-rheumatoid Neg Hx     Malignant Hyperthermia Neg Hx     Pseudocholinesterase Deficiency Neg Hx     Delayed Awakening Neg Hx     Post-op Nausea/Vomiting Neg Hx     Post-op Cognitive Dysfunction Neg Hx     Emergence Delirium Neg Hx        Social History     Social History    Marital status:      Spouse name: N/A    Number of children: N/A    Years of education: N/A     Occupational History    Not on file. Social History Main Topics    Smoking status: Never Smoker    Smokeless tobacco: Never Used    Alcohol use No    Drug use: No    Sexual activity: Not Currently     Other Topics Concern    Not on file     Social History Narrative         ALLERGIES: Codeine; Flu vac 2015 (65 up)-[id number](pf); Fluvirin [id number]; and Naprosyn [naproxen]    Review of Systems   Constitutional: Negative for fever. HENT: Negative for sore throat. Respiratory: Negative for cough. Musculoskeletal:        Positive for right hip pain   Neurological: Negative for headaches. All other systems reviewed and are negative. There were no vitals filed for this visit. Physical Exam   Constitutional: He is oriented to person, place, and time. He appears well-developed and well-nourished. No distress. HENT:   Head: Normocephalic and atraumatic. Eyes: Conjunctivae are normal.   Neck: Normal range of motion. Cardiovascular: Normal rate, regular rhythm, normal heart sounds and intact distal pulses. Exam reveals no friction rub. No murmur heard. Pulmonary/Chest: Effort normal and breath sounds normal. No respiratory distress. He has no wheezes. He has no rales. He exhibits no tenderness. Abdominal: Soft. Bowel sounds are normal. He exhibits no distension. There is no tenderness. There is no rebound and no guarding. Musculoskeletal: He exhibits tenderness and deformity. He exhibits no edema. Right hip externally rotated- dopplerable dp and pt pulse; feet cold b/l   Neurological: He is alert and oriented to person, place, and time. Coordination normal.   Skin: Skin is warm and dry. He is not diaphoretic. No pallor. Psychiatric: He has a normal mood and affect.  His behavior is normal.   Nursing note and vitals reviewed. MDM  Number of Diagnoses or Management Options  Closed nondisplaced transverse fracture of shaft of right femur, initial encounter [facility]):   Diagnosis management comments: Hip fx vs dislocation       Amount and/or Complexity of Data Reviewed  Clinical lab tests: ordered and reviewed  Tests in the radiology section of CPT®: ordered and reviewed  Obtain history from someone other than the patient: yes (family)  Discuss the patient with other providers: yes (ortho)  Independent visualization of images, tracings, or specimens: yes (ekg)    Patient Progress  Patient progress: stable        ED Course       Procedures      EKG interpretation: (Preliminary)  Rhythm: normal sinus rhythm; and regular . Rate (approx.): 80; Axis: normal; P wave: normal; QRS interval: normal ; ST/T wave: non-specific changes  3:44 PM positive dp and pt pulses via doppler    4:24 PM Pt still has dopplerable dp pulse    CONSULT NOTE:  4:47 PM Juan M Thomas MD spoke with Kayley Tong. Discussed available diagnostic tests and clinical findings. He is in agreement with care plans as outlined. Also discussed case with Dr. Ralph Beltran. Ortho will admit patient. 6:16 PM  Ortho at bedside. Wife reports low oxygen before surgery however sats 80 with no oxygen. Though he denies sob will get vq for PE as his creatinine is mildly elevated. Ortho PA will follow up scan. Urine pending to look for uti    6:38 PM  Per ct spoke with dr Aroldo Teixeira and change in creatinine is not significant enough he wants cta not vq will change order.  Ortho aware

## 2018-01-30 NOTE — PERIOP NOTES
[facility] Chart Audit - Patient Encounter Reviewed

## 2018-01-30 NOTE — IP AVS SNAPSHOT
[facility] 
 
 
 [address] [place] [address] 
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 
[phone] Shantanu Newberry MD   [facility] [address] [place] [address] 
[phone] Pastor Gomez MD In 10 days For wound re-check [facility], [address] [place] [address] 
[phone] Your Scheduled Appointments Wednesday February 28, 2018 12:00 PM EST  
(Arrive by 11:30 AM) [facility] BONE DOSE with [facility] NM INJ RM 1 [facility] RAD NUC MED ([address]) [address] [place] [address]  
[phone] 1. Please bring any recent X-rays with you to the procedure. 2. You must bring a LIST or BAG of all current medication you are taking with you to your appointment. 3. There is no specific physical preparation for this procedure. 4. This is a TWO part study with 3 to 4 hours in between the inject and the scan. Patient can leave the facility if they wish to and return for the scan portion. 5. Materials for this procedure are ordered in advance and are time-sensitive. If you need to cancel or reschedule, you must call [phone] at least 24 hours prior to your appointment time. Registration Park in designated visitor/patient parking. Enter through the main entrance, which is just to the left of the fountain. Once inside, go around the corner to the left. You will register in Outpatient Registration. Wednesday February 28, 2018  1:30 PM EST  
CT ABD PELV W CONT with [facility] CT 2 [facility] RAD CT ([address]) [address]  
[phone] CONTRAST STUDY: 1. The patient should not eat solid food four hours before the appointment but should be encouraged to drink clear liquids. 2.  If you have to drink oral contrast, please pick it up any weekday prior to your appointment, if you cannot please check in 2 hrs before appt time. 3.  The patient will require IV access for contrast administration. 4.  The patient should not take Ibuprofen (Advil, Motrin, etc.) and Naproxen Sodium (Aleve, etc.)  on the day of the exam. Stopping non-steroidal anti-inflammatory agents (NSAIDs) like Ibuprofen decreases the risk of kidney damage from the x-ray contrast (dye). 5.  Bring any non [facility] facility films/images pertaining to the area of interest with you on the day of appointment. 6.  Bring current lab work if available (within last 90 days CMP) ***If scheduled at [facility], iSTAT is not available, labs will need to be done before appointment*** 7. Check in at registration at least 30 minutes before appt time unless you were instructed to do otherwise. Park in designated visitor/patient parking. Enter through the main entrance, which is just to the left of the fountain. Once inside, go around the corner to the left. You will register in Outpatient Registration. Wednesday February 28, 2018  3:00 PM EST  
(Arrive by 2:30 PM) [facility] NM BONE SCAN [address] BODY with [facility] NM RM 1 [facility] RAD NUC MED ([address]) [address]  
[phone] 1. Please bring any recent X-rays with you to the procedure. 2. You must bring a LIST or BAG of all current medication you are taking with you to your appointment. 3. There is no specific physical preparation for this procedure. 4. This is a TWO part study with 3 to 4 hours in between the inject and the scan. Patient can leave the facility if they wish to and return for the scan portion. 5. Materials for this procedure are ordered in advance and are time-sensitive. If you need to cancel or reschedule, you must call [phone] at least 24 hours prior to your appointment time. Registration Park in designated visitor/patient parking. Enter through the main entrance, which is just to the left of the fountain. Once inside, go around the corner to the left. You will register in Outpatient Registration. Discharge Orders None A check pablo indicates which time of day the medication should be taken. My Medications START taking these medications Instructions Each Dose to Equal  
 Morning Noon Evening Bedtime  
 cefUROXime 500 mg tablet Commonly known as:  CEFTIN Take 1 Tab by mouth two (2) times a day. Indications: Skin and Skin Structure Infection 500 mg CHANGE how you take these medications Instructions Each Dose to Equal  
 Morning Noon Evening Bedtime * traMADol 50 mg tablet Commonly known as:  ULTRAM  
What changed:  Another medication with the same name was added. Make sure you understand how and when to take each. Your last dose was:  2/5/18  09:00 Take 1 Tab by mouth every six (6) hours as needed for Pain (Take for breakthrough pain if Oxycodone is not working). Max Daily Amount: 200 mg. Indications: Pain, Post-op Pain, Diagnosis Hip and Knee Arthritis ICD 10 - M16.9  
 50 mg  
    
   
   
   
  
 * traMADol 50 mg tablet Commonly known as:  Keaton Escalona  
 What changed: You were already taking a medication with the same name, and this prescription was added. Make sure you understand how and when to take each. Take 1 Tab by mouth every six (6) hours as needed for Pain. Max Daily Amount: 200 mg.  
 50 mg  
    
   
   
   
  
 * Notice: This list has 2 medication(s) that are the same as other medications prescribed for you. Read the directions carefully, and ask your doctor or other care provider to review them with you. CONTINUE taking these medications Instructions Each Dose to Equal  
 Morning Noon Evening Bedtime  
 aspirin delayed-release 325 mg tablet Take 1 Tab by mouth two (2) times a day. 325 mg  
    
   
   
   
  
 busPIRone 10 mg tablet Commonly known as:  BUSPAR Your last dose was:  2/5/18  09:00 Take 10 mg by mouth two (2) times a day. 10 mg  
    
   
   
   
  
 ergocalciferol 50,000 unit capsule Commonly known as:  ERGOCALCIFEROL Take 50,000 Units by mouth Every Saturday. 62871 Units FLUoxetine 20 mg capsule Commonly known as:  PROzac Your last dose was:  2/5/18  09:00 Take 1 capsule by mouth daily. 20 mg  
    
   
   
   
  
 gabapentin 300 mg capsule Commonly known as:  NEURONTIN Your last dose was:  2/5/18  09;00 Take 300 mg by mouth three (3) times daily. 300 mg  
    
   
   
   
due LORazepam 0.5 mg tablet Commonly known as:  ATIVAN Take 1 tablet by mouth two (2) times daily as needed for Anxiety. 0.5 mg  
    
   
   
   
  
 metoclopramide HCl 10 mg tablet Commonly known as:  REGLAN Your last dose was:  2/5/18  11:40 Your next dose is:  Dinner the Bedtime Take 1 Tab by mouth Before breakfast, lunch, dinner and at bedtime. 10 mg  
    
   
   
  
   
  
  
 montelukast 10 mg tablet Commonly known as:  SINGULAIR Your last dose was:  2/4/18 Take 10 mg by mouth every evening.   
 10 mg  
    
   
 ondansetron 8 mg disintegrating tablet Commonly known as:  ZOFRAN ODT Take 0.5 Tabs by mouth every eight (8) hours as needed for Nausea. 4 mg RABEprazole 20 mg tablet Commonly known as:  ACIPHEX Take 20 mg by mouth daily. 20 mg  
    
   
   
   
  
 simvastatin 40 mg tablet Commonly known as:  ZOCOR Your last dose was:  2/4/18  PM  
   
 Take 1 tablet by mouth nightly. 40 mg  
    
   
   
   
  
  
 ZETIA 10 mg tablet Generic drug:  ezetimibe Your last dose was:  2/5/18  09:00 Take 10 mg by mouth daily. 10 mg  
    
   
   
   
  
  
STOP taking these medications   
 oxyCODONE IR 5 mg immediate release tablet Commonly known as:  Lucero Mariscal Where to Get Your Medications Information on where to get these meds will be given to you by the nurse or doctor. ! Ask your nurse or doctor about these medications  
  cefUROXime 500 mg tablet  
 traMADol 50 mg tablet Discharge Instructions Nutrition Recommendations for Discharge:          
 
Continue Oral Nutrition Supplements at discharge: Ensure Active High Protein for Muscle Health 1-2 times per day 
for 30 days unless otherwise directed by your Primary Care Physician. This product can be purchased at your local grocery store or drug store and online. Ara Martinez, VALERIE 
  
 
 
 
REVISION TOTAL HIP DISCHARGE INSTRUCTIONS Patient: Samuel Simpson MRN: [id number]  SSN: [id number] Please take the time to review the following instructions before you leave the hospital and use them as guidelines during your recovery from surgery. If you have any questions you may contact my office at [phone]. After hours or during the weekend you may reach me personally at [phone] if there is an emergency.  
 
SPECIAL INSTRUCTIONS :  
 1. Do not bend greater than 90 degrees at the hip for 4 weeks following your discharge 2. Avoid exercises or activities which bring the leg out or away from the mid-line of the body. The surgical repair involves this muscle and it will require 4 weeks to heal.   
3. You may walk as tolerated and are encouraged to work daily on progressing your activities with a walker initially. Wound Care/ Dressing Changes: DRESSING :  
 
Aquacel Dressing : This may be removed by home health 7 days after the date of your surgery. If there is no drainage, then a simple dressing may be used or no dressing at all. Other dressing options can be purchased over the counter at a local pharmacy or medical supply vendor. A porous adhesive dressing such as pictured above can be purchased at a local [employer] or [employer]. You only need to keep the incision covered for 7 days after showers. A dressing may be used for longer if there are issues with clothing clinging to the incision. Showering/ Bathing: You may shower with the aquacel dressing in place. This is left in place for 7 days following discharge from the hospital. If your incision is dry without drainage you may shower following your discharge home. After 7 days your aquacel dressing should be removed for showering. It is fine to have water run over the incision. Do not vigorously scrub your incision. Apply a clean, dry dressing after you have dried your incision. Do not take a bath or get into a swimming pool / [employer] until you follow up with Dr. Brian Pulliam. Do not soak your incision under water. If there is continued drainage or you are concerned contact Dr Emeli Menendez office prior to showering [phone] . Diet: 
You may advance to your regular diet as tolerated. Increase your clear liquid intake for the next 2-3 days. Medication: 
 
 
1.  You will be given prescriptions for pain medication when you are discharged from the hospital. The side effects of these medications can be substantial and the narcotic medications are not mandatory. You may substitute these medications with Tylenol or Alleve / Motrin. 2.  Please use the medications as prescribed. Pain medications may cause constipation- Colace twice daily and Miralax one scoop daily while taking the narcotic medication should help prevent constipation. Please discuss with your local pharmacist regarding increasing this dosage if constipation persists. Other possible side effects of pain medication are dizziness, headache, nausea, vomiting, and urinary retention. Discontinue the pain medication if you develop itching, rash, shortness of breath, or difficulties swallowing. If these symptoms become severe or are not relieved by discontinuing the medication, you should seek immediate medical attention. 3. Refills of pain medication are authorized during office hours only (8 AM- 5 PM  Monday thru Friday). Many of these medication will require you or a family member to pick-up a physical prescription at the office. 4. Medications other than antiinflammatories will not be called into the pharmacy after business hours. 5. Do not take Tylenol/Acetaminophen in addition to your pain medication as most pain medications already contain this ingredient. Do not exceed 4000mg of Tylenol/Acetaminophen per day. 6. You may resume the medication(s) you were taking prior to your surgery. Narcotics may change the effects of some antidepressant medication(s). If you have any questions about possible interactions between your regular medications and the pain medication, you should ask the pharmacist or contact the prescribing physician. 7. You will be discharged with prescriptions for additional pain medications (Tramadol or Toradol) and a medication for nausea and vomiting (Phenergan).  You only need to fill these prescriptions if the primary pain medication is not working or you experiencing post-op nausea. 8. If you have constipation which is not improved by oral stool softeners then a Ducolax suppository should be purchased over the counter. 9. Continue the blood thinner (Aspirin or Lovenox) for a total of 30 days following surgery. Follow up appointment: 
 
Please call our office at [phone] for your follow up appointment. This should be scheduled 10-14 days following the date of surgery. Physical Therapy / Nursing: 
 
Physical Therapy following surgery will be arranged at home along with at home nursing care. They have specific instructions for rehab and wound care. .  
 
Returning to work: 
 
Normal return to work is 3-12 weeks following revision total hip replacement. Depending on your progression following surgery and specific job duties you may take longer for a full return to work. DRIVING You should not return to driving until you are off all narcotic pain medications and able to safely and quickly apply the brakes. This is normally 3-6 weeks for left hips and 4-8 weeks for right hip. Important Signs and Symptoms: 
 
If any of the following signs or symptoms occur, you should contact Dr. Lala Wakefield office. Please be advised if a problem arises which you feel requires immediate medical attention or you are unable to contact Dr. Lala Wakefield office you should seek immediate medical attention at the ER or other health care facility you have access to. 
 
1. A sudden increase in swelling and/or redness or warmth at the area your surgery was performed which isnt relieved by rest, ice, and elevation. 2. Oral temperature greater than 101 degrees for 12 hours or more which isnt relieved by an increase in fluid intake and taking 2 Tylenol every 4-6 hours. 3. Excessive drainage from your incisions, or drainage which hasnt stopped by 72 hours after your surgery. 4. Fever, chills, shortness of breath, chest pain, nausea, vomiting or other signs and symptoms which are of concern to you. frequently asked questions ? What should I take for pain? 
o In general you will be discharged with three medications for pain (Extra Strength Tylenol, Oxycodone 5mg and Tramadol). This may vary slightly depending on what you were taking in the hospital.  
? 1st Line  Extra Strength Tylenol 1-2 tablets (500-1000mg) every 4-6 hrs. 
? After 2 days this dose should not exceed 8 tablets per day ? This is the first and only medication you need to take. Initially you may need 2 tablets every 4 hours, but as your pain subsides, this will taper to 1 tablet every 6 hours. ? 2nd Line - Tramadol 50mg (1 tablet) every 8 hours ? 3rd Line  Oxycodone 1 (5mg) - 2 (10mg) every 4 hours (Or as directed), take these between Percocet doses if your pain is not below 4 / 10. This may be needed only for several days following your discharge. ? 4th Line  Add Alleve 220mg every 12 hours or Motrin 400mg (200mg x 2) every 8 hours ? When should I call for advice regarding my pain? 
o After 12 hours on the above regimen, if nothing is working call the office [phone] or [phone]) or call my cell after hours [phone]. 
? Can I get refills? 
o Narcotic refills are provided for the first 6 weeks following surgery. ? I will generally try to taper down to a single narcotic medication by your two week appointment. o Try Tylenol 650mg along with Alleve 220mg or Motrin 200mg during the majority of the day. ? Save the narcotic pain medications for physical therapy (1 hour prior) and before sleeping at night. ? Keep in mind you need to discontinue these medications prior to returning to driving. ? Is swelling normal? 
o Almost everyone has some degree of swelling following surgery. o Following hip and knee replacement surgery, swelling can be normal below the incision for the first 1-2 weeks. ? This swelling peaks around 5-7 days after surgery. ? You may have some bruising around the back of the thigh, calf and even into the foot. ? What should I do for the swelling? 
o Keep the limb elevated. o Apply compression socks (knee high for total knees and up to the mid-thigh for total hips.  
o Heat or ice may be applied, choose the modality that makes you the most comfortable. ? How long should I remain on blood thinners following surgery? 
o Thirty days ? When can I drive? 
o Once you have stopped using regular narcotic pain medications (Percocet, Lortab, etc.) and can safely apply the brakes without hesitation. ? When can I shower? o 72hours following surgery if the incision is dry. 
o No submersion of the incision, bathing or swimming for 14 days following surgery or until cleared by Dr Cherrie Rangel. ? What do I do with the dressing when I shower? 
o The dressing can be removed. o The incision is sealed with Dermabond (Biologic glue) and except for wounds which are draining should be watertight. ? How active should I be following surgery? o Progress activities in moderation at your own pace.  
o Walk each day and set progressive goals with small increments (1st week  ½ block of walking, 2nd week  1 block, 3rd week  2 blocks, etc.) Please do not hesitate to call me at [phone] (cell phone) for questions following surgery - MD Sailaja Rdz MD 
Cell [phone] José Miguel Buenrostro PA-C Cell [phone] Medical Staff : Luke Edouard Office : [phone] [employer] Announcement We are excited to announce that we are making your provider's discharge notes available to you in [employer]. You will see these notes when they are completed and signed by the physician that discharged you from your recent hospital stay.   If you have any questions or concerns about any information you see in [employer]arks, please call the Health Information Department where you were seen or reach out to your Primary Care Provider for more information about your plan of care. Introducing [facility]! [facility] introduces Hypemarks patient portal. Now you can access parts of your medical record, email your doctor's office, and request medication refills online. 1. In your internet browser, go to [email or web address] 2. Click on the First Time User? Click Here link in the Sign In box. You will see the New Member Sign Up page. 3. Enter your Hypemarks Access Code exactly as it appears below. You will not need to use this code after youve completed the sign-up process. If you do not sign up before the expiration date, you must request a new code. · Hypemarks Access Code: [email or web address] Expires: 4/4/2018 10:22 AM 
 
4. Enter the last four digits of your Social Security Number (xxxx) and Date of Birth (mm/dd/yyyy) as indicated and click Submit. You will be taken to the next sign-up page. 5. Create a Hypemarks ID. This will be your Hypemarks login ID and cannot be changed, so think of one that is secure and easy to remember. 6. Create a Hypemarks password. You can change your password at any time. 7. Enter your Password Reset Question and Answer. This can be used at a later time if you forget your password. 8. Enter your e-mail address. You will receive e-mail notification when new information is available in [address] 9. Click Sign Up. You can now view and download portions of your medical record. 10. Click the Download Summary menu link to download a portable copy of your medical information. If you have questions, please visit the Frequently Asked Questions section of the Hypemarks website. Remember, Hypemarks is NOT to be used for urgent needs. For medical emergencies, dial 911. Now available from your iPhone and Android! Unresulted Labs-Please follow up with your PCP about these lab tests Order Current Status CULTURE, ANAEROBIC Preliminary result CULTURE, BODY FLUID W GRAM STAIN Preliminary result Providers Seen During Your Hospitalization Provider Specialty Primary office phone Jess Alcantara MD Emergency Medicine [phone] Radha Gil MD Orthopedic Surgery [phone] Your Primary Care Physician (PCP) Primary Care Physician Office Phone Office Fax MORATAYA, Para Flash [phone] You are allergic to the following Allergen Reactions Codeine Nausea and Vomiting Flu Vac 2015 (65 Up)-Mf59c(Pf) Nausea Only Fluvirin [phone] Other (comments) GI upset Naprosyn (Naproxen) Nausea and Vomiting Gas Recent Documentation Height Weight BMI Smoking Status 1.702 m 94.3 kg 32.56 kg/m2 Never Smoker Emergency Contacts Name Discharge Info Relation Home Work Mobile C/Col[employer] 10 CAREGIVER [3] Daughter [21]   [phone] Patient Belongings The following personal items are in your possession at time of discharge: 
  Dental Appliances: Uppers (removed and placed in belongings bag)  Visual Aid: Glasses, With patient   Hearing Aids/Status: Right  Home Medications: None   Jewelry: None  Clothing: At bedside    Other Valuables: None Please provide this summary of care documentation to your next provider. Signatures-by signing, you are acknowledging that this After Visit Summary has been reviewed with you and you have received a copy. Patient Signature:  ____________________________________________________________ Date:  ____________________________________________________________  
  
Antionette Keto Provider Signature:  ____________________________________________________________ Date:  ____________________________________________________________

## 2018-01-30 NOTE — PROGRESS NOTES
1/30/2018   CARE MANAGEMENT NOTE:  CM is following in the ER for READMISSION. EMR reviewed. Per chart hx, pt was hospitalized at [facility] from 1/15/18 - 1/18/18 with dx of osteoarthritis of right hip. Pt discharged home with [employer]. [place] was contacted to assess pt for any applicable community resources. A referral was also made to [facility] because pt reported that he is only fed one meal per day. Initial Assessment:  Reportedly, pt resides with his dtr without any steps. PTA, pt was ambulatory with a rollator but also uses a w/c. Pt has prescription drug coverage, and prefers [facility] DR MERA WILLARD in [place]. Pt currently receives homecare from [facility]  [employer] [facility]. DME includes a rolling walker. PCP is Dr. Krishan Liriano. Readmission Assessment:  Pt presents to the ER today and was diagnosed with a fracture. She will be readmitted for further medical management. Action Plan:  CM will follow pt's current hospital course and will assist with any post discharge needs as appropriate. If pt returns home with continued homecare from [address] [place] [address], a resumption order will be required.   [place]

## 2018-01-30 NOTE — CONSULTS
ORTHOPEDIC CONSULT    Subjective:     Date of Consultation:  January 30, 2018    Referring Physician:  Dr. Radha Gaspar is a 81 y.o. male who presented to the ER [facility] after experiencing severe right hip pain. He was seen by Dr. Linh LEES today in the office and when he arrived home he felt a pop in his right hip. He was unable to bear weight following this and was brought to [facility] via EMS. He had a right MARYJANE anterior approach done on January 36NC without complication. He was slated to do physical therapy and was apparently doing well following surgery. He was not complaining of pain prior to this episode today. He has been having some episodes of SOB and his family states he was having trouble when he ambulates with increasing dyspnea.       Patient Active Problem List    Diagnosis Date Noted    Primary osteoarthritis of right hip 01/15/2018    S/P total hip arthroplasty 01/15/2018    Renal calculus     Hiatal hernia     Depression     Arthritis     Anxiety state     Osteoarthritis 10/20/2015    Primary osteoarthritis of left knee 10/20/2015    Primary localized osteoarthrosis, lower leg 10/20/2015    Hypertension 10/20/2015    Hyperlipidemia 10/20/2015    Status post joint replacement 03/18/2014    Right knee DJD 02/18/2014    Osteoarthrosis, unspecified whether generalized or localized, lower leg 01/16/2014    Meniscus tear 04/26/2013    Neuropathy, lower extremity 12/26/2011    Unspecified vitamin D deficiency 04/26/2011    Other dyspnea and respiratory abnormality 09/28/2010    Hypercholesteremia 07/06/2009    Insomnia 07/06/2009    GERD (gastroesophageal reflux disease) 07/06/2009    Allergic rhinitis 07/06/2009    Anxiety 07/06/2009    OA (osteoarthritis) 07/06/2009    Prostate cancer ([facility] [address] 07/06/2009    Cancer ([facility] [address] 01/01/2001     Family History   Problem Relation Age of Onset    Heart Disease Mother     Stroke Mother     Cancer Father      COLON    Arthritis-rheumatoid Neg Hx     Malignant Hyperthermia Neg Hx     Pseudocholinesterase Deficiency Neg Hx     Delayed Awakening Neg Hx     Post-op Nausea/Vomiting Neg Hx     Post-op Cognitive Dysfunction Neg Hx     Emergence Delirium Neg Hx       Social History   Substance Use Topics    Smoking status: Never Smoker    Smokeless tobacco: Never Used    Alcohol use No     Past Medical History:   Diagnosis Date    Anxiety state, unspecified     Arthritis     Cancer ([facility] [address] 2001    prostate,     Depression     GERD (gastroesophageal reflux disease)     Hiatal hernia     Hypercholesteremia     Renal calculus     Seasonal allergic rhinitis       Past Surgical History:   Procedure Laterality Date    ENDOSCOPY, COLON, DIAGNOSTIC      HX APPENDECTOMY      HX CATARACT REMOVAL  2012    HX COLONOSCOPY      HX ENDOSCOPY      HX GI      colonoscopy    HX KNEE REPLACEMENT  02/18/14    right total knee    HX KNEE REPLACEMENT Left 10/2015    HX LITHOTRIPSY  11/2011    HX ORTHOPAEDIC  1992    rt. carpal tunnel    HX PROSTATECTOMY  2003    bowel incontinence since surgery    HX TONSILLECTOMY        Prior to Admission medications    Medication Sig Start Date End Date Taking? Authorizing Provider   aspirin delayed-release 325 mg tablet Take 1 Tab by mouth two (2) times a day. 1/15/18   Pastor Gomez MD   oxyCODONE IR (ROXICODONE) 5 mg immediate release tablet Take 1-2 Tabs by mouth every four (4) hours as needed for Pain. Max Daily Amount: 60 mg. Indications: Pain 1/15/18   Pastor Gomez MD   traMADol Linda Mech) 50 mg tablet Take 1 Tab by mouth every six (6) hours as needed for Pain (Take for breakthrough pain if Oxycodone is not working). Max Daily Amount: 200 mg. Indications: Pain, Post-op Pain, Diagnosis Hip and Knee Arthritis ICD 10 - M16.9 1/15/18   Pastor Gomez MD   ondansetron (ZOFRAN ODT) 8 mg disintegrating tablet Take 0.5 Tabs by mouth every eight (8) hours as needed for Nausea.  1/15/18   Pastor Gomez MD   acetaminophen (TYLENOL EXTRA STRENGTH) 500 mg tablet Take 1-2 Tabs by mouth every six (6) hours as needed for Pain. Not to exceed 4,000mg in any 24 hour period  Indications: Pain 1/15/18   Katerin Birmingham MD   busPIRone (BUSPAR) 10 mg tablet Take 10 mg by mouth daily. Historical Provider   RABEprazole (ACIPHEX) 20 mg tablet Take 20 mg by mouth daily. Historical Provider   montelukast (SINGULAIR) 10 mg tablet Take 10 mg by mouth every evening. Historical Provider   gabapentin (NEURONTIN) 300 mg capsule Take 300 mg by mouth three (3) times daily. Historical Provider   ezetimibe (ZETIA) 10 mg tablet Take 10 mg by mouth daily. Phys Other, MD   fluoxetine (PROZAC) 20 mg capsule Take 1 capsule by mouth daily. 2/45/51   Armani Francisco MD   simvastatin (ZOCOR) 40 mg tablet Take 1 tablet by mouth nightly. 6/01/15   Armani Francisco MD   lorazepam (ATIVAN) 0.5 mg tablet Take 1 tablet by mouth two (2) times daily as needed for Anxiety. 3/59/72   Armani Francisco MD   loratadine (CLARITIN) 10 mg tablet Take 1 Tab by mouth daily. 2/29/47   Armani Francisco MD   cholecalciferol, vitamin D3, (VITAMIN D3) 2,000 unit Tab Take 1 Tab by mouth daily. 6/6/85   Armani Francisco MD   metoclopramide HCl (REGLAN) 10 mg tablet Take 1 Tab by mouth Before breakfast, lunch, dinner and at bedtime. 8/1/03   Armani Francisco MD     Current Facility-Administered Medications   Medication Dose Route Frequency    sodium chloride 0.9 % bolus infusion 500 mL  500 mL IntraVENous ONCE     Current Outpatient Prescriptions   Medication Sig    aspirin delayed-release 325 mg tablet Take 1 Tab by mouth two (2) times a day.  oxyCODONE IR (ROXICODONE) 5 mg immediate release tablet Take 1-2 Tabs by mouth every four (4) hours as needed for Pain. Max Daily Amount: 60 mg. Indications: Pain    traMADol (ULTRAM) 50 mg tablet Take 1 Tab by mouth every six (6) hours as needed for Pain (Take for breakthrough pain if Oxycodone is not working).  Max Daily Amount: 200 mg. Indications: Pain, Post-op Pain, Diagnosis Hip and Knee Arthritis ICD 10 - M16.9    ondansetron (ZOFRAN ODT) 8 mg disintegrating tablet Take 0.5 Tabs by mouth every eight (8) hours as needed for Nausea.  acetaminophen (TYLENOL EXTRA STRENGTH) 500 mg tablet Take 1-2 Tabs by mouth every six (6) hours as needed for Pain. Not to exceed 4,000mg in any 24 hour period  Indications: Pain    busPIRone (BUSPAR) 10 mg tablet Take 10 mg by mouth daily.  RABEprazole (ACIPHEX) 20 mg tablet Take 20 mg by mouth daily.  montelukast (SINGULAIR) 10 mg tablet Take 10 mg by mouth every evening.  gabapentin (NEURONTIN) 300 mg capsule Take 300 mg by mouth three (3) times daily.  ezetimibe (ZETIA) 10 mg tablet Take 10 mg by mouth daily.  fluoxetine (PROZAC) 20 mg capsule Take 1 capsule by mouth daily.  simvastatin (ZOCOR) 40 mg tablet Take 1 tablet by mouth nightly.  lorazepam (ATIVAN) 0.5 mg tablet Take 1 tablet by mouth two (2) times daily as needed for Anxiety.  loratadine (CLARITIN) 10 mg tablet Take 1 Tab by mouth daily.  cholecalciferol, vitamin D3, (VITAMIN D3) 2,000 unit Tab Take 1 Tab by mouth daily.  metoclopramide HCl (REGLAN) 10 mg tablet Take 1 Tab by mouth Before breakfast, lunch, dinner and at bedtime. Allergies   Allergen Reactions    Codeine Nausea and Vomiting    Flu Vac 2015 (65 Up)-Mf59c(Pf) Nausea Only    Fluvirin 1266-4619 Other (comments)     GI upset    Naprosyn [Naproxen] Nausea and Vomiting     Gas          Review of Systems:  Pertinent items are noted in HPI.     Objective:     Patient Vitals for the past 8 hrs:   BP Temp Pulse Resp SpO2 Height Weight   01/30/18 1800 164/74 - 92 17 95 % - -   01/30/18 1745 176/68 - 83 17 90 % - -   01/30/18 1730 165/73 - (!) 113 21 93 % - -   01/30/18 1715 165/73 - 82 18 90 % - -   01/30/18 1700 167/69 - (!) 111 22 (!) 87 % - -   01/30/18 1645 154/65 - (!) 142 18 (!) 89 % - -   01/30/18 1600 150/74 - - - 91 % - - 18 1550 - - - - 95 % - -   18 1545 (!) 171/94 97.1 °F (36.2 °C) 91 18 (!) 84 % 5' 7\" (1.702 m) 94.3 kg (208 lb)     Temp (24hrs), Av.1 °F (36.2 °C), Min:97.1 °F (36.2 °C), Max:97.1 °F (36.2 °C)        EXAM: GEN: Well developed and well nurtured  MUSC: Right leg with an anterior incision. Mild erythema around the incision. There is no significant deformity present. There is pain present to palpation of the anterior and mid femur. There is no ecchymosis. There is barely palpable PT pulse distally. There is a dopplerable DP and PT distally. FINDINGS: An AP view of the pelvis and a frogleg lateral view of the right hip  demonstrate total hip replacement of the right hip. There is a fracture of the  cortex of the proximal shaft of the right femur medially. This is nondisplaced. There is no dislocation of the hip. Bony pelvis is unremarkable. Surgical clips  seen in the lower pelvis. .     IMPRESSION  IMPRESSION:  There is an acute fracture of the proximal shaft of the right femur  as described. .    Data Review   Recent Results (from the past 24 hour(s))   CBC WITH AUTOMATED DIFF    Collection Time: 18  4:32 PM   Result Value Ref Range    WBC 13.0 (H) 4.1 - 11.1 K/uL    RBC 4.33 4.10 - 5.70 M/uL    HGB 12.2 12.1 - 17.0 g/dL    HCT 38.7 36.6 - 50.3 %    MCV 89.4 80.0 - 99.0 FL    MCH 28.2 26.0 - 34.0 PG    MCHC 31.5 30.0 - 36.5 g/dL    RDW 14.3 11.5 - 14.5 %    PLATELET 792 964 - 181 K/uL    MPV 10.0 8.9 - 12.9 FL    NRBC 0.0 0  WBC    ABSOLUTE NRBC 0.00 0.00 - 0.01 K/uL    NEUTROPHILS 82 (H) 32 - 75 %    LYMPHOCYTES 7 (L) 12 - 49 %    MONOCYTES 7 5 - 13 %    EOSINOPHILS 2 0 - 7 %    BASOPHILS 1 0 - 1 %    IMMATURE GRANULOCYTES 1 (H) 0.0 - 0.5 %    ABS. NEUTROPHILS 10.7 (H) 1.8 - 8.0 K/UL    ABS. LYMPHOCYTES 1.0 0.8 - 3.5 K/UL    ABS. MONOCYTES 0.9 0.0 - 1.0 K/UL    ABS. EOSINOPHILS 0.2 0.0 - 0.4 K/UL    ABS. BASOPHILS 0.1 0.0 - 0.1 K/UL    ABS. IMM.  GRANS. 0.1 (H) 0.00 - 0.04 K/UL DF AUTOMATED     METABOLIC PANEL, BASIC    Collection Time: 01/30/18  4:32 PM   Result Value Ref Range    Sodium 139 136 - 145 mmol/L    Potassium 4.3 3.5 - 5.1 mmol/L    Chloride 102 97 - 108 mmol/L    CO2 29 21 - 32 mmol/L    Anion gap 8 5 - 15 mmol/L    Glucose 135 (H) 65 - 100 mg/dL    BUN 14 6 - 20 MG/DL    Creatinine 1.35 (H) 0.70 - 1.30 MG/DL    BUN/Creatinine ratio 10 (L) 12 - 20      GFR est AA >60 >60 ml/min/1.73m2    GFR est non-AA 51 (L) >60 ml/min/1.73m2    Calcium 9.4 8.5 - 10.1 MG/DL   URINALYSIS W/MICROSCOPIC    Collection Time: 01/30/18  5:44 PM   Result Value Ref Range    Color YELLOW/STRAW      Appearance CLEAR CLEAR      Specific gravity 1.018 1.003 - 1.030      pH (UA) 6.0 5.0 - 8.0      Protein NEGATIVE  NEG mg/dL    Glucose NEGATIVE  NEG mg/dL    Ketone NEGATIVE  NEG mg/dL    Bilirubin NEGATIVE  NEG      Blood NEGATIVE  NEG      Urobilinogen 0.2 0.2 - 1.0 EU/dL    Nitrites NEGATIVE  NEG      Leukocyte Esterase NEGATIVE  NEG      WBC 0-4 0 - 4 /hpf    RBC 0-5 0 - 5 /hpf    Epithelial cells FEW FEW /lpf    Bacteria NEGATIVE  NEG /hpf    Hyaline cast 0-2 0 - 5 /lpf   URINE CULTURE HOLD SAMPLE    Collection Time: 01/30/18  5:44 PM   Result Value Ref Range    Urine culture hold URINE ON HOLD IN MICROBIOLOGY DEPT FOR 3 DAYS           Assessment/Plan:     A: Right femur periprosthetic fracture    P: 1. Admit to orthopedics under Dr. Fartun Griffin       2. NWB RLE. 3. Consent for ORIF of right femur tomorrow afternoon by Dr. Fartun Griffin       4. NPO after midnight       5. Vancomycin 1 gram q 24hrs and Ancef 2 g q 6hrs        6. Dyspnea: on 2 L NC now. Patient with worsening SOB and Dyspnea- CTA versus V/Q scan depending on CrCl    Discussed case with Dr. Fartun Griffin who agrees with plan.         Kim Oliva [place]   Orthopaedic Surgery [address]

## 2018-01-31 ENCOUNTER — ANESTHESIA (OUTPATIENT)
Dept: SURGERY | Age: 83
DRG: 467 | End: 2018-01-31
Payer: MEDICARE

## 2018-01-31 ENCOUNTER — ANESTHESIA EVENT (OUTPATIENT)
Dept: SURGERY | Age: 83
DRG: 467 | End: 2018-01-31
Payer: MEDICARE

## 2018-01-31 ENCOUNTER — APPOINTMENT (OUTPATIENT)
Dept: GENERAL RADIOLOGY | Age: 83
DRG: 467 | End: 2018-01-31
Attending: ORTHOPAEDIC SURGERY
Payer: MEDICARE

## 2018-01-31 PROBLEM — M97.9XXA PERIPROSTHETIC FRACTURE AROUND INTERNAL PROSTHETIC JOINT: Status: ACTIVE | Noted: 2018-01-31

## 2018-01-31 LAB
ATRIAL RATE: 82 BPM
CALCULATED P AXIS, ECG09: 1 DEGREES
CALCULATED R AXIS, ECG10: -25 DEGREES
CALCULATED T AXIS, ECG11: 37 DEGREES
CREAT SERPL-MCNC: 1.23 MG/DL (ref 0.7–1.3)
DIAGNOSIS, 93000: NORMAL
P-R INTERVAL, ECG05: 148 MS
Q-T INTERVAL, ECG07: 380 MS
QRS DURATION, ECG06: 104 MS
QTC CALCULATION (BEZET), ECG08: 443 MS
VENTRICULAR RATE, ECG03: 82 BPM

## 2018-01-31 PROCEDURE — 74011250636 HC RX REV CODE- 250/636

## 2018-01-31 PROCEDURE — 77030019880 HC ABD PLLW HIP DJOR -B: Performed by: ORTHOPAEDIC SURGERY

## 2018-01-31 PROCEDURE — 96361 HYDRATE IV INFUSION ADD-ON: CPT

## 2018-01-31 PROCEDURE — 77030002933 HC SUT MCRYL J&J -A: Performed by: ORTHOPAEDIC SURGERY

## 2018-01-31 PROCEDURE — 99218 HC RM OBSERVATION: CPT

## 2018-01-31 PROCEDURE — 76210000006 HC OR PH I REC 0.5 TO 1 HR: Performed by: ORTHOPAEDIC SURGERY

## 2018-01-31 PROCEDURE — 77030012935 HC DRSG AQUACEL BMS -B: Performed by: ORTHOPAEDIC SURGERY

## 2018-01-31 PROCEDURE — C1776 JOINT DEVICE (IMPLANTABLE): HCPCS | Performed by: ORTHOPAEDIC SURGERY

## 2018-01-31 PROCEDURE — 65660000000 HC RM CCU STEPDOWN

## 2018-01-31 PROCEDURE — 65270000029 HC RM PRIVATE

## 2018-01-31 PROCEDURE — 77030020788: Performed by: ORTHOPAEDIC SURGERY

## 2018-01-31 PROCEDURE — 77030026438 HC STYL ET INTUB CARD -A: Performed by: ANESTHESIOLOGY

## 2018-01-31 PROCEDURE — 0QS80ZZ REPOSITION RIGHT FEMORAL SHAFT, OPEN APPROACH: ICD-10-PCS | Performed by: ORTHOPAEDIC SURGERY

## 2018-01-31 PROCEDURE — 77030015817 HC CBL BEAD SS STRY -F: Performed by: ORTHOPAEDIC SURGERY

## 2018-01-31 PROCEDURE — 74011250636 HC RX REV CODE- 250/636: Performed by: ANESTHESIOLOGY

## 2018-01-31 PROCEDURE — 74011250637 HC RX REV CODE- 250/637: Performed by: PHYSICIAN ASSISTANT

## 2018-01-31 PROCEDURE — 74011000250 HC RX REV CODE- 250

## 2018-01-31 PROCEDURE — 74011250636 HC RX REV CODE- 250/636: Performed by: ORTHOPAEDIC SURGERY

## 2018-01-31 PROCEDURE — 77030018836 HC SOL IRR NACL ICUM -A: Performed by: ORTHOPAEDIC SURGERY

## 2018-01-31 PROCEDURE — 77030013708 HC HNDPC SUC IRR PULS STRY –B: Performed by: ORTHOPAEDIC SURGERY

## 2018-01-31 PROCEDURE — 74011000250 HC RX REV CODE- 250: Performed by: ORTHOPAEDIC SURGERY

## 2018-01-31 PROCEDURE — 76010000172 HC OR TIME 2.5 TO 3 HR INTENSV-TIER 1: Performed by: ORTHOPAEDIC SURGERY

## 2018-01-31 PROCEDURE — 77030013079 HC BLNKT BAIR HGGR 3M -A: Performed by: ANESTHESIOLOGY

## 2018-01-31 PROCEDURE — 87077 CULTURE AEROBIC IDENTIFY: CPT | Performed by: EMERGENCY MEDICINE

## 2018-01-31 PROCEDURE — 87186 SC STD MICRODIL/AGAR DIL: CPT | Performed by: EMERGENCY MEDICINE

## 2018-01-31 PROCEDURE — 77030033067 HC SUT PDO STRATFX SPIR J&J -B: Performed by: ORTHOPAEDIC SURGERY

## 2018-01-31 PROCEDURE — 72170 X-RAY EXAM OF PELVIS: CPT

## 2018-01-31 PROCEDURE — 87075 CULTR BACTERIA EXCEPT BLOOD: CPT | Performed by: EMERGENCY MEDICINE

## 2018-01-31 PROCEDURE — 74011250636 HC RX REV CODE- 250/636: Performed by: PHYSICIAN ASSISTANT

## 2018-01-31 PROCEDURE — 96376 TX/PRO/DX INJ SAME DRUG ADON: CPT

## 2018-01-31 PROCEDURE — 77030010507 HC ADH SKN DERMBND J&J -B: Performed by: ORTHOPAEDIC SURGERY

## 2018-01-31 PROCEDURE — 96366 THER/PROPH/DIAG IV INF ADDON: CPT

## 2018-01-31 PROCEDURE — 77030020782 HC GWN BAIR PAWS FLX 3M -B

## 2018-01-31 PROCEDURE — 77030008684 HC TU ET CUF COVD -B: Performed by: ANESTHESIOLOGY

## 2018-01-31 PROCEDURE — 0SRR01Z REPLACEMENT OF RIGHT HIP JOINT, FEMORAL SURFACE WITH METAL SYNTHETIC SUBSTITUTE, OPEN APPROACH: ICD-10-PCS | Performed by: ORTHOPAEDIC SURGERY

## 2018-01-31 PROCEDURE — 77030011640 HC PAD GRND REM COVD -A: Performed by: ORTHOPAEDIC SURGERY

## 2018-01-31 PROCEDURE — 76060000036 HC ANESTHESIA 2.5 TO 3 HR: Performed by: ORTHOPAEDIC SURGERY

## 2018-01-31 PROCEDURE — 77030031139 HC SUT VCRL2 J&J -A: Performed by: ORTHOPAEDIC SURGERY

## 2018-01-31 PROCEDURE — 0SPR0JZ REMOVAL OF SYNTHETIC SUBSTITUTE FROM RIGHT HIP JOINT, FEMORAL SURFACE, OPEN APPROACH: ICD-10-PCS | Performed by: ORTHOPAEDIC SURGERY

## 2018-01-31 PROCEDURE — 74011000272 HC RX REV CODE- 272: Performed by: ORTHOPAEDIC SURGERY

## 2018-01-31 PROCEDURE — 36415 COLL VENOUS BLD VENIPUNCTURE: CPT | Performed by: PHYSICIAN ASSISTANT

## 2018-01-31 PROCEDURE — 82565 ASSAY OF CREATININE: CPT | Performed by: PHYSICIAN ASSISTANT

## 2018-01-31 PROCEDURE — 87205 SMEAR GRAM STAIN: CPT | Performed by: EMERGENCY MEDICINE

## 2018-01-31 DEVICE — HEAD FEM DELT V40 -5MM NK 36MM -- V40 BIOLOX: Type: IMPLANTABLE DEVICE | Site: HIP | Status: FUNCTIONAL

## 2018-01-31 DEVICE — CABLE/SLEEVE SET BEAD 2MM CBL --: Type: IMPLANTABLE DEVICE | Site: HIP | Status: FUNCTIONAL

## 2018-01-31 DEVICE — BODY FEM DIA23MM +0MM OFFSET STD HIP TI CONE MOD REV RESTR: Type: IMPLANTABLE DEVICE | Site: HIP | Status: FUNCTIONAL

## 2018-01-31 RX ORDER — MIDAZOLAM HYDROCHLORIDE 1 MG/ML
INJECTION, SOLUTION INTRAMUSCULAR; INTRAVENOUS AS NEEDED
Status: DISCONTINUED | OUTPATIENT
Start: 2018-01-31 | End: 2018-01-31 | Stop reason: HOSPADM

## 2018-01-31 RX ORDER — MIDAZOLAM HYDROCHLORIDE 1 MG/ML
2 INJECTION, SOLUTION INTRAMUSCULAR; INTRAVENOUS
Status: DISCONTINUED | OUTPATIENT
Start: 2018-01-31 | End: 2018-01-31 | Stop reason: HOSPADM

## 2018-01-31 RX ORDER — NALOXONE HYDROCHLORIDE 0.4 MG/ML
0.2 INJECTION, SOLUTION INTRAMUSCULAR; INTRAVENOUS; SUBCUTANEOUS
Status: DISCONTINUED | OUTPATIENT
Start: 2018-01-31 | End: 2018-02-01

## 2018-01-31 RX ORDER — DIPHENHYDRAMINE HYDROCHLORIDE 50 MG/ML
12.5 INJECTION, SOLUTION INTRAMUSCULAR; INTRAVENOUS
Status: ACTIVE | OUTPATIENT
Start: 2018-01-31 | End: 2018-02-01

## 2018-01-31 RX ORDER — PROPOFOL 10 MG/ML
INJECTION, EMULSION INTRAVENOUS AS NEEDED
Status: DISCONTINUED | OUTPATIENT
Start: 2018-01-31 | End: 2018-01-31 | Stop reason: HOSPADM

## 2018-01-31 RX ORDER — AMOXICILLIN 250 MG
1 CAPSULE ORAL 2 TIMES DAILY
Status: DISCONTINUED | OUTPATIENT
Start: 2018-01-31 | End: 2018-02-05 | Stop reason: HOSPADM

## 2018-01-31 RX ORDER — ROCURONIUM BROMIDE 10 MG/ML
INJECTION, SOLUTION INTRAVENOUS AS NEEDED
Status: DISCONTINUED | OUTPATIENT
Start: 2018-01-31 | End: 2018-01-31 | Stop reason: HOSPADM

## 2018-01-31 RX ORDER — LIDOCAINE HYDROCHLORIDE 20 MG/ML
INJECTION, SOLUTION EPIDURAL; INFILTRATION; INTRACAUDAL; PERINEURAL AS NEEDED
Status: DISCONTINUED | OUTPATIENT
Start: 2018-01-31 | End: 2018-01-31 | Stop reason: HOSPADM

## 2018-01-31 RX ORDER — POVIDONE-IODINE 10 %
SOLUTION, NON-ORAL TOPICAL AS NEEDED
Status: DISCONTINUED | OUTPATIENT
Start: 2018-01-31 | End: 2018-01-31 | Stop reason: HOSPADM

## 2018-01-31 RX ORDER — SODIUM CHLORIDE 0.9 % (FLUSH) 0.9 %
5-10 SYRINGE (ML) INJECTION AS NEEDED
Status: DISCONTINUED | OUTPATIENT
Start: 2018-01-31 | End: 2018-02-05 | Stop reason: HOSPADM

## 2018-01-31 RX ORDER — CEFAZOLIN SODIUM IN 0.9 % NACL 2 G/50 ML
2 INTRAVENOUS SOLUTION, PIGGYBACK (ML) INTRAVENOUS EVERY 8 HOURS
Status: DISCONTINUED | OUTPATIENT
Start: 2018-02-01 | End: 2018-01-31

## 2018-01-31 RX ORDER — SUCCINYLCHOLINE CHLORIDE 20 MG/ML
INJECTION INTRAMUSCULAR; INTRAVENOUS AS NEEDED
Status: DISCONTINUED | OUTPATIENT
Start: 2018-01-31 | End: 2018-01-31 | Stop reason: HOSPADM

## 2018-01-31 RX ORDER — FAMOTIDINE 20 MG/1
20 TABLET, FILM COATED ORAL 2 TIMES DAILY
Status: DISCONTINUED | OUTPATIENT
Start: 2018-01-31 | End: 2018-02-05 | Stop reason: HOSPADM

## 2018-01-31 RX ORDER — SODIUM CHLORIDE 0.9 % (FLUSH) 0.9 %
5-10 SYRINGE (ML) INJECTION EVERY 8 HOURS
Status: DISCONTINUED | OUTPATIENT
Start: 2018-02-01 | End: 2018-02-05 | Stop reason: HOSPADM

## 2018-01-31 RX ORDER — ONDANSETRON 2 MG/ML
INJECTION INTRAMUSCULAR; INTRAVENOUS AS NEEDED
Status: DISCONTINUED | OUTPATIENT
Start: 2018-01-31 | End: 2018-01-31 | Stop reason: HOSPADM

## 2018-01-31 RX ORDER — POLYETHYLENE GLYCOL 3350 17 G/17G
17 POWDER, FOR SOLUTION ORAL DAILY
Status: DISCONTINUED | OUTPATIENT
Start: 2018-02-01 | End: 2018-02-05 | Stop reason: HOSPADM

## 2018-01-31 RX ORDER — CEFAZOLIN SODIUM IN 0.9 % NACL 2 G/50 ML
2 INTRAVENOUS SOLUTION, PIGGYBACK (ML) INTRAVENOUS EVERY 8 HOURS
Status: DISCONTINUED | OUTPATIENT
Start: 2018-02-01 | End: 2018-02-01

## 2018-01-31 RX ORDER — LIDOCAINE HYDROCHLORIDE 10 MG/ML
0.1 INJECTION, SOLUTION EPIDURAL; INFILTRATION; INTRACAUDAL; PERINEURAL AS NEEDED
Status: DISCONTINUED | OUTPATIENT
Start: 2018-01-31 | End: 2018-02-01

## 2018-01-31 RX ORDER — HYDROMORPHONE HYDROCHLORIDE 2 MG/ML
.25-1 INJECTION, SOLUTION INTRAMUSCULAR; INTRAVENOUS; SUBCUTANEOUS
Status: DISCONTINUED | OUTPATIENT
Start: 2018-01-31 | End: 2018-01-31 | Stop reason: HOSPADM

## 2018-01-31 RX ORDER — BUPIVACAINE HYDROCHLORIDE AND EPINEPHRINE 5; 5 MG/ML; UG/ML
INJECTION, SOLUTION EPIDURAL; INTRACAUDAL; PERINEURAL AS NEEDED
Status: DISCONTINUED | OUTPATIENT
Start: 2018-01-31 | End: 2018-01-31 | Stop reason: HOSPADM

## 2018-01-31 RX ORDER — SODIUM CHLORIDE, SODIUM LACTATE, POTASSIUM CHLORIDE, CALCIUM CHLORIDE 600; 310; 30; 20 MG/100ML; MG/100ML; MG/100ML; MG/100ML
125 INJECTION, SOLUTION INTRAVENOUS CONTINUOUS
Status: DISCONTINUED | OUTPATIENT
Start: 2018-01-31 | End: 2018-02-01

## 2018-01-31 RX ORDER — DIPHENHYDRAMINE HYDROCHLORIDE 50 MG/ML
12.5 INJECTION, SOLUTION INTRAMUSCULAR; INTRAVENOUS AS NEEDED
Status: DISCONTINUED | OUTPATIENT
Start: 2018-01-31 | End: 2018-01-31 | Stop reason: HOSPADM

## 2018-01-31 RX ORDER — FLUMAZENIL 0.1 MG/ML
0.2 INJECTION INTRAVENOUS
Status: DISCONTINUED | OUTPATIENT
Start: 2018-01-31 | End: 2018-02-01

## 2018-01-31 RX ORDER — CEFAZOLIN SODIUM IN 0.9 % NACL 2 G/100 ML
PLASTIC BAG, INJECTION (ML) INTRAVENOUS AS NEEDED
Status: DISCONTINUED | OUTPATIENT
Start: 2018-01-31 | End: 2018-01-31 | Stop reason: HOSPADM

## 2018-01-31 RX ORDER — ONDANSETRON 2 MG/ML
4 INJECTION INTRAMUSCULAR; INTRAVENOUS
Status: DISPENSED | OUTPATIENT
Start: 2018-01-31 | End: 2018-02-01

## 2018-01-31 RX ORDER — VANCOMYCIN/0.9 % SOD CHLORIDE 1.5G/250ML
1500 PLASTIC BAG, INJECTION (ML) INTRAVENOUS EVERY 12 HOURS
Status: COMPLETED | OUTPATIENT
Start: 2018-02-01 | End: 2018-02-02

## 2018-01-31 RX ORDER — FENTANYL CITRATE 50 UG/ML
INJECTION, SOLUTION INTRAMUSCULAR; INTRAVENOUS AS NEEDED
Status: DISCONTINUED | OUTPATIENT
Start: 2018-01-31 | End: 2018-01-31 | Stop reason: HOSPADM

## 2018-01-31 RX ORDER — SODIUM CHLORIDE, SODIUM LACTATE, POTASSIUM CHLORIDE, CALCIUM CHLORIDE 600; 310; 30; 20 MG/100ML; MG/100ML; MG/100ML; MG/100ML
125 INJECTION, SOLUTION INTRAVENOUS CONTINUOUS
Status: DISCONTINUED | OUTPATIENT
Start: 2018-01-31 | End: 2018-01-31 | Stop reason: HOSPADM

## 2018-01-31 RX ORDER — SODIUM CHLORIDE 9 MG/ML
125 INJECTION, SOLUTION INTRAVENOUS CONTINUOUS
Status: DISPENSED | OUTPATIENT
Start: 2018-01-31 | End: 2018-02-01

## 2018-01-31 RX ORDER — FACIAL-BODY WIPES
10 EACH TOPICAL DAILY PRN
Status: DISCONTINUED | OUTPATIENT
Start: 2018-02-02 | End: 2018-02-05 | Stop reason: HOSPADM

## 2018-01-31 RX ORDER — ENOXAPARIN SODIUM 100 MG/ML
40 INJECTION SUBCUTANEOUS DAILY
Status: DISCONTINUED | OUTPATIENT
Start: 2018-02-01 | End: 2018-02-05 | Stop reason: HOSPADM

## 2018-01-31 RX ADMIN — SODIUM CHLORIDE, POTASSIUM CHLORIDE, SODIUM LACTATE AND CALCIUM CHLORIDE: 600; 310; 30; 20 INJECTION, SOLUTION INTRAVENOUS at 15:54

## 2018-01-31 RX ADMIN — TRAMADOL HYDROCHLORIDE 50 MG: 50 TABLET, FILM COATED ORAL at 05:54

## 2018-01-31 RX ADMIN — FENTANYL CITRATE 50 MCG: 50 INJECTION, SOLUTION INTRAMUSCULAR; INTRAVENOUS at 16:17

## 2018-01-31 RX ADMIN — HYDROMORPHONE HYDROCHLORIDE 1 MG: 2 INJECTION INTRAMUSCULAR; INTRAVENOUS; SUBCUTANEOUS at 05:54

## 2018-01-31 RX ADMIN — FLUOXETINE 20 MG: 20 CAPSULE ORAL at 09:19

## 2018-01-31 RX ADMIN — SODIUM CHLORIDE 500 ML: 900 INJECTION, SOLUTION INTRAVENOUS at 00:27

## 2018-01-31 RX ADMIN — ROCURONIUM BROMIDE 5 MG: 10 INJECTION, SOLUTION INTRAVENOUS at 16:20

## 2018-01-31 RX ADMIN — LIDOCAINE HYDROCHLORIDE 60 MG: 20 INJECTION, SOLUTION EPIDURAL; INFILTRATION; INTRACAUDAL; PERINEURAL at 16:21

## 2018-01-31 RX ADMIN — SODIUM CHLORIDE, POTASSIUM CHLORIDE, SODIUM LACTATE AND CALCIUM CHLORIDE: 600; 310; 30; 20 INJECTION, SOLUTION INTRAVENOUS at 18:56

## 2018-01-31 RX ADMIN — GABAPENTIN 300 MG: 300 CAPSULE ORAL at 09:19

## 2018-01-31 RX ADMIN — METOCLOPRAMIDE HYDROCHLORIDE 10 MG: 10 TABLET ORAL at 05:55

## 2018-01-31 RX ADMIN — SODIUM CHLORIDE 125 ML/HR: 900 INJECTION, SOLUTION INTRAVENOUS at 23:43

## 2018-01-31 RX ADMIN — CEFAZOLIN 2 G: 1 INJECTION, POWDER, FOR SOLUTION INTRAMUSCULAR; INTRAVENOUS; PARENTERAL at 03:54

## 2018-01-31 RX ADMIN — CEFAZOLIN 2 G: 1 INJECTION, POWDER, FOR SOLUTION INTRAMUSCULAR; INTRAVENOUS; PARENTERAL at 12:05

## 2018-01-31 RX ADMIN — ROCURONIUM BROMIDE 20 MG: 10 INJECTION, SOLUTION INTRAVENOUS at 16:26

## 2018-01-31 RX ADMIN — PROPOFOL 150 MG: 10 INJECTION, EMULSION INTRAVENOUS at 16:21

## 2018-01-31 RX ADMIN — HYDROMORPHONE HYDROCHLORIDE 1 MG: 2 INJECTION INTRAMUSCULAR; INTRAVENOUS; SUBCUTANEOUS at 02:06

## 2018-01-31 RX ADMIN — METOCLOPRAMIDE HYDROCHLORIDE 10 MG: 10 TABLET ORAL at 12:05

## 2018-01-31 RX ADMIN — Medication 2 G: at 16:21

## 2018-01-31 RX ADMIN — HYDROMORPHONE HYDROCHLORIDE 1 MG: 2 INJECTION INTRAMUSCULAR; INTRAVENOUS; SUBCUTANEOUS at 09:19

## 2018-01-31 RX ADMIN — FENTANYL CITRATE 50 MCG: 50 INJECTION, SOLUTION INTRAMUSCULAR; INTRAVENOUS at 16:21

## 2018-01-31 RX ADMIN — BUSPIRONE HYDROCHLORIDE 10 MG: 5 TABLET ORAL at 09:19

## 2018-01-31 RX ADMIN — FENTANYL CITRATE 50 MCG: 50 INJECTION, SOLUTION INTRAMUSCULAR; INTRAVENOUS at 19:02

## 2018-01-31 RX ADMIN — EZETIMIBE 10 MG: 10 TABLET ORAL at 09:19

## 2018-01-31 RX ADMIN — ONDANSETRON 4 MG: 2 INJECTION INTRAMUSCULAR; INTRAVENOUS at 18:46

## 2018-01-31 RX ADMIN — MIDAZOLAM HYDROCHLORIDE 2 MG: 1 INJECTION, SOLUTION INTRAMUSCULAR; INTRAVENOUS at 16:17

## 2018-01-31 RX ADMIN — ACETAMINOPHEN 1000 MG: 500 TABLET ORAL at 02:06

## 2018-01-31 RX ADMIN — FENTANYL CITRATE 50 MCG: 50 INJECTION, SOLUTION INTRAMUSCULAR; INTRAVENOUS at 18:51

## 2018-01-31 RX ADMIN — SUCCINYLCHOLINE CHLORIDE 100 MG: 20 INJECTION INTRAMUSCULAR; INTRAVENOUS at 16:22

## 2018-01-31 RX ADMIN — SODIUM CHLORIDE 50 ML/HR: 900 INJECTION, SOLUTION INTRAVENOUS at 03:54

## 2018-01-31 NOTE — PROGRESS NOTES
[facility]: MED RECONCILIATION    Comments/Recommendations:    Patient only had morning medication today. Medication(s) ADDED to PTA list:  1. Ergocalciferol 50,000 units every Saturday    Medication(s) REMOVED from PTA list:  1. APAP prn  2. Cholecalcierfol 2000 units  3. Loratidine 10 mg daily    Medication(s) ADJUSTED on PTA list:  1. Buspirone frequency changed to \"BID\" (from daily entry)      Information obtained from: Daughter Lennie Patel) read information over phone    Allergies: Codeine; Flu vac 2015 (65 up)-mf59c(pf); Fluvirin 6722-8438; and Naprosyn [naproxen]    Prior to Admission Medications:     Prior to Admission Medications   Prescriptions Last Dose Informant Patient Reported? Taking? RABEprazole (ACIPHEX) 20 mg tablet 1/30/2018 at Unknown time Self Yes Yes   Sig: Take 20 mg by mouth daily. aspirin delayed-release 325 mg tablet 1/30/2018 at Unknown time  No Yes   Sig: Take 1 Tab by mouth two (2) times a day. busPIRone (BUSPAR) 10 mg tablet 1/30/2018 at am Self Yes Yes   Sig: Take 10 mg by mouth two (2) times a day. ergocalciferol (ERGOCALCIFEROL) 50,000 unit capsule 1/27/2018  Yes Yes   Sig: Take 50,000 Units by mouth Every Saturday. ezetimibe (ZETIA) 10 mg tablet 1/30/2018 at Unknown time Self Yes Yes   Sig: Take 10 mg by mouth daily. fluoxetine (PROZAC) 20 mg capsule 1/30/2018 at Unknown time Self No Yes   Sig: Take 1 capsule by mouth daily. gabapentin (NEURONTIN) 300 mg capsule 1/30/2018 at am Self Yes Yes   Sig: Take 300 mg by mouth three (3) times daily. lorazepam (ATIVAN) 0.5 mg tablet  Self No Yes   Sig: Take 1 tablet by mouth two (2) times daily as needed for Anxiety. metoclopramide HCl (REGLAN) 10 mg tablet 1/30/2018 at Unknown time Self No Yes   Sig: Take 1 Tab by mouth Before breakfast, lunch, dinner and at bedtime. montelukast (SINGULAIR) 10 mg tablet 1/29/2018 at pm Self Yes Yes   Sig: Take 10 mg by mouth every evening.    ondansetron (ZOFRAN ODT) 8 mg disintegrating tablet   No Yes   Sig: Take 0.5 Tabs by mouth every eight (8) hours as needed for Nausea. oxyCODONE IR (ROXICODONE) 5 mg immediate release tablet 1/30/2018 at Unknown time  No Yes   Sig: Take 1-2 Tabs by mouth every four (4) hours as needed for Pain. Max Daily Amount: 60 mg. Indications: Pain   simvastatin (ZOCOR) 40 mg tablet 1/29/2018 at pm Self No Yes   Sig: Take 1 tablet by mouth nightly. traMADol (ULTRAM) 50 mg tablet   No Yes   Sig: Take 1 Tab by mouth every six (6) hours as needed for Pain (Take for breakthrough pain if Oxycodone is not working). Max Daily Amount: 200 mg.  Indications: Pain, Post-op Pain, Diagnosis Hip and Knee Arthritis ICD 10 - M16.9      Facility-Administered Medications: None            Juan Sepulveda PHARMD   Contact: [phone]

## 2018-01-31 NOTE — PROGRESS NOTES
SUBJECTIVE:     Jewels Carty is a 81 y.o. male  evaluated for a right hip pain and fracture. The patient does not have dementia and they are able to verbally converse during the exam. Their mechanism of injury was sitting back in a wheelchair. The patient localizes their pain to right hip. Intensity is noted to be moderate to severe. This injury occured yesterday. Other concerns include none. Past Medical History :   Past Medical History:   Diagnosis Date    Anxiety state, unspecified     Arthritis     Cancer ([address] 2001    prostate,     Depression     GERD (gastroesophageal reflux disease)     Hiatal hernia     Hypercholesteremia     Renal calculus     Seasonal allergic rhinitis        Past Surgical History :   Past Surgical History:   Procedure Laterality Date    ENDOSCOPY, COLON, DIAGNOSTIC      HX APPENDECTOMY      HX CATARACT REMOVAL  2012    HX COLONOSCOPY      HX ENDOSCOPY      HX GI      colonoscopy    HX KNEE REPLACEMENT  02/18/14    right total knee    HX KNEE REPLACEMENT Left 10/2015    HX LITHOTRIPSY  11/2011    HX ORTHOPAEDIC  1992    rt. carpal tunnel    HX PROSTATECTOMY  2003    bowel incontinence since surgery    HX TONSILLECTOMY          Medications :  See med reconciliation    Allergies :   Codeine; Flu vac 2015 (65 up)-mf59c(pf); Fluvirin 2587-1728; and Naprosyn [naproxen]        Objective  Objective:     Vitals :  Patient Vitals for the past 12 hrs:   BP Temp Pulse Resp SpO2   01/31/18 0359 135/62 98 °F (36.7 °C) 66 16 97 %   01/30/18 2331 133/67 97.8 °F (36.6 °C) 71 17 95 %   01/30/18 2200 150/70 - - - 96 %   01/30/18 2047 158/74 - 79 - 95 %       Alert and Oriented x 3, Dementia is not present, hard of hearing. No Acute Respiratory Distress  Heart and Lung exam normal    Focused Physical Exam :   TTP of the hip, motor-sensory intact.    No Lymphadenopathy   Palpable pulses  No skin trophic changes    Labs :   Recent Labs      01/31/18   0101  01/30/18   1632 HGB   --   12.2   HCT   --   38.7   NA   --   139   K   --   4.3   CL   --   102   CO2   --   29   BUN   --   14   CREA  1.23  1.35*   GLU   --   135*       X-rays :   CT of the hip - medial calcar fracture. Subsidence of the stem. ASSESSMENT :  Allison-prosthetic fracture with stem subsidence     1. Therapy / Weight Bearing Recommendations following surgery : WBAT with walker. 2.    DVT Prophylaxis (Following surgery) : Mechanical and Lovenox  3. Anticipated Disposition : SNF  4. Surgery Recommendations : ORIF of the femur today. Thank you for allowing to participate in this patients care. Please do not hesitate to contact my office or contact me with any questions or concerns. I will continue to follow the patient in the hospital and arrange for follow-up after the surgery.               Sailaja Alvarado MD  Cell [phone]  José Miguel Buenrostro PA-C  Cell [phone]  Medical Staff : Luke Edouard  Office : [phone]

## 2018-01-31 NOTE — PROGRESS NOTES
[facility] Dosing Services: 1/31=0/18    Automatic Dosing Consult for Vancomycin by NABEEL Samuels  Pharmacist reviewed antibiotic appropriateness for  81y.o. year old ,male for indication of periprosthetic infection s/p MARYJANE 1/15/18. Day of Therapy 1    Current Antimicrobial Therapy:  Vancomycin 1.75gm IVPB x1 then 1500mg ivpb q24h. Previous Dosing Regimen N/a   Other Current Antibiotics Cefazolin 2gm ivpb q8h   Serum Creatinine/ BUN Lab Results   Component Value Date/Time    Creatinine 1.35 01/30/2018 04:32 PM    Creatinine (POC) 1.5 09/11/2014 06:48 PM       Creatinine Clearance Estimated Creatinine Clearance: 47 mL/min (based on Cr of 1.35). Temp 97.1 °F (36.2 °C)    WBC Lab Results   Component Value Date/Time    WBC 13.0 01/30/2018 04:32 PM      H/H Lab Results   Component Value Date/Time    HGB 12.2 01/30/2018 04:32 PM      Platelets Lab Results   Component Value Date/Time    PLATELET 883 00/91/6038 04:32 PM        Significant Cultures: none  Recommendations regarding antibiotic therapy: surgery tomorrow afternoon for ORIF of right femur.     Pharmacist THEO VasquezD  Contact information: [phone]

## 2018-01-31 NOTE — PHYSICIAN ADVISORY
Letter of Status Determination:   Recommend hospitalization status upgraded from   OBSERVATION  to INPATIENT  Status     Pt Name:  Maryellen Becker   MR#   [address] [facility] # [id number] /  [id number]   [facility]#  [id number]   [address] [place] [address]  VH72/04  @ [address] [address] [facility]   Hospitalization date  1/30/2018  3:35 PM   Current Attending Physician  No att. providers found   Principal diagnosis  <principal problem not specified>   \"Periprosthetic fracture of femur following total replacement of hip\"    Clinicals  81 y.o. y.o  male hospitalized with above diagnosis   This pt is going for ABBY of the fractured femur. He suffers from complex morbidities and remains at high risk of deterioration      Milliman (MCG) criteria   Does  NOT apply    STATUS DETERMINATION  This patient is at high risk of adverse events and deterioration based on documented clinical data, comorbid conditions and current acute care course. Mr. Maryellen Becker is expected to meet Inpatient Admission status criteria in accordance with CMS regulation Section 43 .3. Specifically, due to medical necessity the patient's stay is expected to exceed Two Midnights. It is our recommendation that this patient's hospitalization status should be upgraded from  OBSERVATION to INPATIENT status. The final decision of the patient's hospitalization status depends on the attending physician's judgment. Additional comments     Payor: Benigno Zhong / Plan: [address] / Product Type: Medicare /         Lilli Romo MD MPH FACP     Physician Advisor    [address]   [facility], [address] [place] [address]    Cell  [phone]        [phone]    .

## 2018-01-31 NOTE — ED NOTES
Bedside shift change report given to [address] [facility] (oncoming nurse) by Mignon Rousseau RN (offgoing nurse). Report included the following information SBAR, Kardex, ED Summary, Procedure Summary, Intake/Output, MAR and Recent Results.

## 2018-01-31 NOTE — PROGRESS NOTES
TRANSFER - IN REPORT:    Verbal report received from Felicia Tolentino RN on Steffany Mehta  being received from ER Room 21for ordered procedure      Report consisted of patients Situation, Background, Assessment and   Recommendations(SBAR). Information from the following report(s) SBAR was reviewed with the receiving nurse. Opportunity for questions and clarification was provided. Assessment completed upon patients arrival to unit and care assumed.

## 2018-01-31 NOTE — ANESTHESIA PREPROCEDURE EVALUATION
Anesthetic History   No history of anesthetic complications            Review of Systems / Medical History  Patient summary reviewed, nursing notes reviewed and pertinent labs reviewed    Pulmonary  Within defined limits                 Neuro/Psych   Within defined limits           Cardiovascular  Within defined limits  Hypertension              Exercise tolerance: >4 METS     GI/Hepatic/Renal  Within defined limits   GERD           Endo/Other  Within defined limits      Obesity and arthritis     Other Findings   Comments: Hard of hearing           Physical Exam    Airway  Mallampati: II    Neck ROM: normal range of motion   Mouth opening: Normal     Cardiovascular  Regular rate and rhythm,  S1 and S2 normal,  no murmur, click, rub, or gallop  Rhythm: regular  Rate: normal         Dental    Dentition: Full upper dentures and Full lower dentures     Pulmonary  Breath sounds clear to auscultation               Abdominal  GI exam deferred       Other Findings            Anesthetic Plan    ASA: 3  Anesthesia type: general          Induction: Intravenous  Anesthetic plan and risks discussed with: Patient      Discussed GA vs. Spinal - pt and family prefer GA today.

## 2018-01-31 NOTE — PROGRESS NOTES
[facility] Dosing Services: 1/30/18    Pharmacist Renal Dosing Progress Note for NABEEL Samuels      The following medication: cefazolin was automatically dose-adjusted per [facility] P&T Committee Protocol, with respect to renal function. Pt Weight:   Wt Readings from Last 1 Encounters:   01/30/18 94.3 kg (208 lb)         Previous Regimen  2gm ivpb q6h   Serum Creatinine Lab Results   Component Value Date/Time    Creatinine 1.35 01/30/2018 04:32 PM    Creatinine (POC) 1.5 09/11/2014 06:48 PM       Creatinine Clearance Estimated Creatinine Clearance: 47 mL/min (based on Cr of 1.35). BUN Lab Results   Component Value Date/Time    BUN 14 01/30/2018 04:32 PM    BUN (POC) 13 09/11/2014 06:48 PM       Dosage changed to:  2gm ivpb q 8h      Pharmacy to continue to monitor patient daily. Will make dosage adjustments based upon changing renal function.   Tonya Espino, PHARMD. Contact information:  [phone]

## 2018-01-31 NOTE — OP NOTES
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, initial encounter  Left Allison Prosthetic fracture  Periprosthetic fracture around internal prosthetic joint    Date of Procedure: 1/31/2018   Preoperative Diagnosis: Left Allison Prosthetic fracture  Postoperative Diagnosis: Left Allison Prosthetic fracture    Procedure: Procedure(s): FEMUR OPEN REDUCTION INTERNAL FIXATION WITH STEM EXCHANGE RIGHT  Surgeon: Baron Darling MD  Assistant(s): Galileo Mcbride PA-C  Anesthesia: General   Estimated Blood Loss: 20cc  Specimens:   ID Type Source Tests Collected by Time Destination   1 : Right Hip Synovial Fluid Wound  ANAEROBIC/AEROBIC/GRAM STAIN Baron Darling MD 1/31/2018 1647 Microbiology      Complications: None    INDICATIONS:    Steffany Mehta is a patient who sustained a periprosthetic hip fracture around an unstable implant and was indicated for revision surgery. We discussed risks, alternatives and expectations prior to surgery. The patient was seen for medical clearance prior to the procedure. DESCRIPTION OF PROCEDURE: The patient was taken to the operating room   suite. The patient then underwent general endotracheal anesthesia and were transferred   to the operating room table in the right lateral decubitus position. Prior to the start of the procedure, the appropriate pause for safety or time out was performed. The limb was prepped and draped in a sterile fashion. The incision was then made over the previous incision. This was   carried down through skin and subcutaneous tissue until the fascia   overlying the gluteus muscles could be identified as well as the distal   iliotibial band. This was sharply incised and repairable layer were mobilized both anteriorly and posteriorly. The hip was dislocated and the previous stem identified. This was carefully removed and the fracture identified.  Following this, the remainder of the base of the fracture site was identified and a single cerclage wire was placed distal to this. Sequential reaming was then performed up to the appropriate size reamer. The final distal stem was then brought up to the field and impacted. Following this, sequential   reaming was performed for the conical body up to the appropriate size, which had very good fill of the proximal portion. Additional cables were passed around the fracture Fragment as needed in preparation for cabling. A trial reduction was performed, and appropriate version was obtained. The hip was noted to be very stable with flexion, internal rotation as well as extension. There was no shuck noted. Leg lengths appeared to be completely neutral.      Subsequently, the distal stem was copiously irrigated, and a cone was placed over this. It was tightened in place utilizing the appropriate tensioning device and appropriate version was set. Another trial reduction was performed the hip was noted to be very stable. Subsequently, the final implants were impacted in place, and the hip was reduced. The previously passed cables were tensioned. Utilizing lobster claw reduction clamps, this is reduced back to near anatomic position. Following this, the wound again was copiously irrigated with normal saline. The tag suture was brought up through the   abductor muscles, and the abductor muscles were repaired back down to the trochanter with #1 Vicryl suture. The vastus lateralis was repaired with 0 Vicryl suture. Following this, several 0 Vicryl sutures were placed in the iliotibial band and #2 Quill suture was used to close the iliotibial band and #2 Marta Staples was used for deep subcutaneous layer closure as well. A 2-0   Vicryl was used to close the skin and staples were applied. A sterile dressing was applied, and the patient was extubated and taken to recovery room in stable condition. IMPLANTS :     Implant Name Type Inv.  Item Serial No.  Lot No. LRB No. Used Action   CABLE/SLEEVE SET BEAD 2MM CBL --  - SNA  CABLE/SLEEVE SET BEAD 2MM CBL --  NA [employer] HOWM [id number] Right 1 Implanted   CABLE/SLEEVE SET BEAD 2MM CBL --  - SNA  CABLE/SLEEVE SET BEAD 2MM CBL --  NA [employer] HOWM [id number] Right 1 Implanted   RESTORATION MODULAR HIP SYSTEM 195MM, 18MM Other  NA [employer] [id number] Right 1 Implanted   HEAD FEM DELT V40 -2.5MM 36MM -- V40 BIOLOX - SNA  HEAD FEM DELT V40 -2.5MM 36MM -- V40 BIOLOX NA [employer] HOWM [id number] Right 1 Explanted   STEM FEM SZ 6 132D [id number] -- ACCOLADE II V40 - SNA  STEM FEM SZ 6 132D [id number] -- ACCOLADE II V40 NA [employer] HOWM [id number] Right 1 Explanted   BODY FEM CONIC REST +0MM 23MM --  - SNA  BODY FEM CONIC REST +0MM 23MM --  NA [employer] HOW [id number] Right 1 Implanted   HEAD FEM DELT V40 -5MM NK 36MM -- V40 BIOLOX - [id number]   HEAD FEM DELT V40 -5MM NK 36MM -- V40 BIOLOX   [employer] HOW   Right 1 Implanted       JUSTIFICATION FOR SURGICAL ASSISTANT:   Surgical Assistant, was requried and necessary in this case, to help with soft tissue retraction, extremity positioning, equiment management, implant management, and wound closure.     Chance Ledezma MD

## 2018-01-31 NOTE — PROGRESS NOTES
Bedside shift change report given to Cleve Gil RN (oncoming nurse) by Tom Callahan RN (offgoing nurse). Report included the following information SBAR, Kardex and MAR.

## 2018-02-01 LAB
ANION GAP SERPL CALC-SCNC: 6 MMOL/L (ref 5–15)
BUN SERPL-MCNC: 9 MG/DL (ref 6–20)
BUN/CREAT SERPL: 8 (ref 12–20)
CALCIUM SERPL-MCNC: 8.3 MG/DL (ref 8.5–10.1)
CHLORIDE SERPL-SCNC: 103 MMOL/L (ref 97–108)
CO2 SERPL-SCNC: 29 MMOL/L (ref 21–32)
CREAT SERPL-MCNC: 1.07 MG/DL (ref 0.7–1.3)
GLUCOSE SERPL-MCNC: 114 MG/DL (ref 65–100)
HGB BLD-MCNC: 9.7 G/DL (ref 12.1–17)
POTASSIUM SERPL-SCNC: 4.4 MMOL/L (ref 3.5–5.1)
SODIUM SERPL-SCNC: 138 MMOL/L (ref 136–145)

## 2018-02-01 PROCEDURE — 74011250636 HC RX REV CODE- 250/636: Performed by: PHYSICIAN ASSISTANT

## 2018-02-01 PROCEDURE — 65270000029 HC RM PRIVATE

## 2018-02-01 PROCEDURE — 74011250637 HC RX REV CODE- 250/637: Performed by: PHYSICIAN ASSISTANT

## 2018-02-01 PROCEDURE — 74011250637 HC RX REV CODE- 250/637: Performed by: ORTHOPAEDIC SURGERY

## 2018-02-01 PROCEDURE — 97162 PT EVAL MOD COMPLEX 30 MIN: CPT

## 2018-02-01 PROCEDURE — 80048 BASIC METABOLIC PNL TOTAL CA: CPT | Performed by: ORTHOPAEDIC SURGERY

## 2018-02-01 PROCEDURE — 65660000000 HC RM CCU STEPDOWN

## 2018-02-01 PROCEDURE — 74011250636 HC RX REV CODE- 250/636: Performed by: ORTHOPAEDIC SURGERY

## 2018-02-01 PROCEDURE — 36415 COLL VENOUS BLD VENIPUNCTURE: CPT | Performed by: ORTHOPAEDIC SURGERY

## 2018-02-01 PROCEDURE — 97165 OT EVAL LOW COMPLEX 30 MIN: CPT | Performed by: OCCUPATIONAL THERAPIST

## 2018-02-01 PROCEDURE — 97530 THERAPEUTIC ACTIVITIES: CPT

## 2018-02-01 PROCEDURE — 85018 HEMOGLOBIN: CPT | Performed by: ORTHOPAEDIC SURGERY

## 2018-02-01 PROCEDURE — 97535 SELF CARE MNGMENT TRAINING: CPT | Performed by: OCCUPATIONAL THERAPIST

## 2018-02-01 RX ORDER — CEFAZOLIN SODIUM IN 0.9 % NACL 2 G/50 ML
2 INTRAVENOUS SOLUTION, PIGGYBACK (ML) INTRAVENOUS EVERY 8 HOURS
Status: COMPLETED | OUTPATIENT
Start: 2018-02-01 | End: 2018-02-02

## 2018-02-01 RX ADMIN — DOCUSATE SODIUM AND SENNOSIDES 1 TABLET: 8.6; 5 TABLET, FILM COATED ORAL at 09:00

## 2018-02-01 RX ADMIN — SIMVASTATIN 40 MG: 20 TABLET, FILM COATED ORAL at 22:12

## 2018-02-01 RX ADMIN — TRAMADOL HYDROCHLORIDE 50 MG: 50 TABLET, FILM COATED ORAL at 08:55

## 2018-02-01 RX ADMIN — Medication 10 ML: at 14:13

## 2018-02-01 RX ADMIN — CEFAZOLIN 2 G: 1 INJECTION, POWDER, FOR SOLUTION INTRAMUSCULAR; INTRAVENOUS; PARENTERAL at 16:57

## 2018-02-01 RX ADMIN — DOCUSATE SODIUM AND SENNOSIDES 1 TABLET: 8.6; 5 TABLET, FILM COATED ORAL at 16:57

## 2018-02-01 RX ADMIN — ENOXAPARIN SODIUM 40 MG: 40 INJECTION SUBCUTANEOUS at 08:55

## 2018-02-01 RX ADMIN — FAMOTIDINE 20 MG: 20 TABLET, FILM COATED ORAL at 16:57

## 2018-02-01 RX ADMIN — CEFAZOLIN 2 G: 1 INJECTION, POWDER, FOR SOLUTION INTRAMUSCULAR; INTRAVENOUS; PARENTERAL at 08:55

## 2018-02-01 RX ADMIN — METOCLOPRAMIDE HYDROCHLORIDE 10 MG: 10 TABLET ORAL at 16:57

## 2018-02-01 RX ADMIN — ACETAMINOPHEN 500 MG: 500 TABLET ORAL at 16:57

## 2018-02-01 RX ADMIN — HYDROMORPHONE HYDROCHLORIDE 1 MG: 2 INJECTION INTRAMUSCULAR; INTRAVENOUS; SUBCUTANEOUS at 14:31

## 2018-02-01 RX ADMIN — BUSPIRONE HYDROCHLORIDE 10 MG: 5 TABLET ORAL at 16:58

## 2018-02-01 RX ADMIN — Medication 10 ML: at 05:00

## 2018-02-01 RX ADMIN — HYDROMORPHONE HYDROCHLORIDE 1 MG: 2 INJECTION INTRAMUSCULAR; INTRAVENOUS; SUBCUTANEOUS at 04:59

## 2018-02-01 RX ADMIN — FAMOTIDINE 20 MG: 20 TABLET, FILM COATED ORAL at 08:55

## 2018-02-01 RX ADMIN — VANCOMYCIN HYDROCHLORIDE 1500 MG: 10 INJECTION, POWDER, LYOPHILIZED, FOR SOLUTION INTRAVENOUS at 02:21

## 2018-02-01 RX ADMIN — VANCOMYCIN HYDROCHLORIDE 1500 MG: 10 INJECTION, POWDER, LYOPHILIZED, FOR SOLUTION INTRAVENOUS at 14:11

## 2018-02-01 RX ADMIN — POLYETHYLENE GLYCOL 3350 17 G: 17 POWDER, FOR SOLUTION ORAL at 08:55

## 2018-02-01 RX ADMIN — MONTELUKAST SODIUM 10 MG: 10 TABLET, FILM COATED ORAL at 22:12

## 2018-02-01 RX ADMIN — GABAPENTIN 300 MG: 300 CAPSULE ORAL at 08:55

## 2018-02-01 RX ADMIN — METOCLOPRAMIDE HYDROCHLORIDE 10 MG: 10 TABLET ORAL at 08:55

## 2018-02-01 RX ADMIN — SODIUM CHLORIDE 125 ML/HR: 900 INJECTION, SOLUTION INTRAVENOUS at 10:43

## 2018-02-01 RX ADMIN — ONDANSETRON 4 MG: 2 INJECTION INTRAMUSCULAR; INTRAVENOUS at 05:00

## 2018-02-01 RX ADMIN — METOCLOPRAMIDE HYDROCHLORIDE 10 MG: 10 TABLET ORAL at 10:41

## 2018-02-01 RX ADMIN — EZETIMIBE 10 MG: 10 TABLET ORAL at 08:55

## 2018-02-01 RX ADMIN — SODIUM CHLORIDE 50 ML/HR: 900 INJECTION, SOLUTION INTRAVENOUS at 22:10

## 2018-02-01 RX ADMIN — CEFAZOLIN 2 G: 1 INJECTION, POWDER, FOR SOLUTION INTRAMUSCULAR; INTRAVENOUS; PARENTERAL at 02:21

## 2018-02-01 RX ADMIN — GABAPENTIN 300 MG: 300 CAPSULE ORAL at 16:57

## 2018-02-01 RX ADMIN — Medication 10 ML: at 22:13

## 2018-02-01 RX ADMIN — METOCLOPRAMIDE HYDROCHLORIDE 10 MG: 10 TABLET ORAL at 22:13

## 2018-02-01 RX ADMIN — HYDROMORPHONE HYDROCHLORIDE 1 MG: 2 INJECTION INTRAMUSCULAR; INTRAVENOUS; SUBCUTANEOUS at 10:41

## 2018-02-01 RX ADMIN — GABAPENTIN 300 MG: 300 CAPSULE ORAL at 22:13

## 2018-02-01 RX ADMIN — FLUOXETINE 20 MG: 20 CAPSULE ORAL at 08:55

## 2018-02-01 RX ADMIN — BUSPIRONE HYDROCHLORIDE 10 MG: 5 TABLET ORAL at 08:55

## 2018-02-01 RX ADMIN — TRAMADOL HYDROCHLORIDE 50 MG: 50 TABLET, FILM COATED ORAL at 18:44

## 2018-02-01 NOTE — PROGRESS NOTES
Bedside shift change report given to Ivone (oncoming nurse) by [employer]** (offgoing nurse). Report included the following information SBAR, Kardex, Procedure Summary, Intake/Output, MAR, Recent Results, Med Rec Status and Cardiac Rhythm NSR.

## 2018-02-01 NOTE — ROUTINE PROCESS
TRANSFER - OUT REPORT:    Verbal report given to DAISHA De Leon on [employer]  being transferred to [facility] for routine post - op       Report consisted of patients Situation, Background, Assessment and   Recommendations(SBAR). Information from the following report(s) SBAR, OR Summary and MAR was reviewed with the receiving nurse. Lines:   Peripheral IV 01/30/18 Right Antecubital (Active)   Site Assessment Clean, dry, & intact 1/31/2018  8:00 PM   Phlebitis Assessment 0 1/31/2018  8:00 PM   Infiltration Assessment 0 1/31/2018  8:00 PM   Dressing Status Clean, dry, & intact 1/31/2018  8:00 PM   Dressing Type Transparent;Tape 1/31/2018  8:00 PM   Hub Color/Line Status Patent; Infusing;Pink 1/31/2018  8:00 PM   Alcohol Cap Used Yes 1/31/2018  8:00 PM        Opportunity for questions and clarification was provided.       Patient transported with:   O2 @ 3 liters  Registered Nurse

## 2018-02-01 NOTE — PROGRESS NOTES
Problem: Falls - Risk of  Goal: *Absence of Falls  Document Khris Fall Risk and appropriate interventions in the flowsheet.    Outcome: Progressing Towards Goal  Fall Risk Interventions:       Mentation Interventions: Bed/chair exit alarm, Door open when patient unattended, Room close to nurse's station, Reorient patient    Medication Interventions: Bed/chair exit alarm, Patient to call before getting OOB    Elimination Interventions: Call light in reach, Bed/chair exit alarm, Patient to call for help with toileting needs    History of Falls Interventions: Bed/chair exit alarm, Room close to nurse's station

## 2018-02-01 NOTE — PROGRESS NOTES
Problem: Patient Education: Go to Patient Education Activity  Goal: Patient/Family Education  physical Therapy TREATMENT  Patient: Roselia Carbajal (34 y.o. male)  Date: 2/1/2018  Diagnosis: Periprosthetic fracture of femur following total replacement of hip, initial encounter  Left Allison Prosthetic fracture  Periprosthetic fracture around internal prosthetic joint <principal problem not specified>  Procedure(s) (LRB):  FEMUR OPEN REDUCTION INTERNAL FIXATION WITH STEM EXCHANGE RIGHT (Right) 1 Day Post-Op  Precautions:    Chart, physical therapy assessment, plan of care and goals were reviewed. ASSESSMENT:  Pt performed LE exercise to surgical leg with mod assist.Pt to sit with max assist of 2. Pt to stand with max assist of 2. Pt only able to stand briefly and was not able to advance feet. Pt max of 2 back to bed. Pt progress slow. Continue goals. Progression toward goals:  []      Improving appropriately and progressing toward goals  []      Improving slowly and progressing toward goals  []      Not making progress toward goals and plan of care will be adjusted     PLAN:  Patient continues to benefit from skilled intervention to address the above impairments. Continue treatment per established plan of care. Discharge Recommendations:  Rehab and [facility]  Further Equipment Recommendations for Discharge:  rolling walker     SUBJECTIVE:       OBJECTIVE DATA SUMMARY:   Critical Behavior:  Neurologic State: Drowsy  Orientation Level: Oriented X4  Cognition: Follows commands, Appropriate safety awareness  Safety/Judgement: Awareness of environment, Fall prevention, Home safety  Functional Mobility Training:  Bed Mobility:  Rolling: Total assistance  Supine to Sit: Max assist of 2. Sit to Supine: Max assist of 2           Transfers:  Sit to Stand: max of 2  Stand to Sit: max of 2                            Balance:  Sitting: Intact; Without support  Standing: Impaired  Standing - Static: Fair;Constant support  Standing - Dynamic : Poor  Ambulation/Gait Training:                    Right Side Weight Bearing: As tolerated  Left Side Weight Bearing: Full    Therapeutic Exercises:   Heel slides ankle pumps hip abd/add  Pain:                    Activity Tolerance:   Pt tolerated treatment fair   Please refer to the flowsheet for vital signs taken during this treatment.   After treatment:   [] Patient left in no apparent distress sitting up in chair  [x] Patient left in no apparent distress in bed  [] Call bell left within reach  [] Nursing notified  [] Caregiver present  [] Bed alarm activated    COMMUNICATION/COLLABORATION:   The patients plan of care was discussed with: Physical Therapist    Blane Pablo PTA   Time Calculation: 23 mins

## 2018-02-01 NOTE — PROGRESS NOTES
Problem: Mobility Impaired (Adult and Pediatric)  Goal: *Acute Goals and Plan of Care (Insert Text)  Physical Therapy Goals  Initiated 2/1/2018    1. Patient will move from supine to sit and sit to supine  in bed with supervision/set-up within 4 days. 2. Patient will perform sit to stand with minimal contact guard assist within 4 days. 3. Patient will ambulate with minimal assistance/contact guard assist for 150 feet with the least restrictive device within 4 days. 4. Patient will ascend/descend 3 stairs with 1 handrail(s) with minimal assistance/contact guard assist within 4 days. 5. Patient will verbalize and demonstrate understanding of 3/3 precautions per protocol within 4 days. 6. Patient will perform home exercise program per protocol with independence within 4 days. physical Therapy EVALUATION  Patient: Ashley Meyers (21 y.o. male)  Date: 2/1/2018  Primary Diagnosis: Periprosthetic fracture of femur following total replacement of hip, initial encounter  Left Allison Prosthetic fracture  Periprosthetic fracture around internal prosthetic joint  Procedure(s) (LRB):  FEMUR OPEN REDUCTION INTERNAL FIXATION WITH STEM EXCHANGE RIGHT (Right) 1 Day Post-Op   Precautions: WBAT RLE       ASSESSMENT :  Based on the objective data described below, the patient presents with decreased functional mobility tolerance and independence 2/2 impaired balance, gait dysfunction, LE weakness, and pain s/p R ORIF of femur fx POD 1. Patient recently underwent R MARYJANE with Dr. Jered Olivarez but had discharged home with his daughter. Patient reports he was primarily using a w/c in the home but did ambulate short distances with a RW. Patient reports he fell on the steps coming into the home. Today, patient cleared by RN for OOB activity, received supine. Patient transferred supine to sit with MOD A x 2, primarily for LE management with good pain tolerance noted to movement in the R hip. No c/o dizziness or nausea EOB.   MAX A x 2 to transfer to standing with patient having difficulty shifting weight to his RLE, excess posterior and lateral lean to the left that required MOD A then MIN A for PT to maintain. Repeated three trials of standing with diminishing ability, increased fatigue. Patient returned to bed as unable to safely get to chair at this time. Positioned in comfort with all needs in reach. Encouraged getting patient into bed position later in the day as tolerated and will work in pm session on getting to chair as appropriate. Would recommend rehab upon d/c as patient is significantly below his baseline level and required two persons to assist with mobility. Patient will benefit from skilled intervention to address the above impairments. Patients rehabilitation potential is considered to be Good  Factors which may influence rehabilitation potential include:   []         None noted  []         Mental ability/status  [x]         Medical condition  []         Home/family situation and support systems  []         Safety awareness  [x]         Pain tolerance/management  []         Other:      PLAN :  Recommendations and Planned Interventions:  [x]           Bed Mobility Training             [x]    Neuromuscular Re-Education  [x]           Transfer Training                   []    Orthotic/Prosthetic Training  [x]           Gait Training                         []    Modalities  [x]           Therapeutic Exercises           []    Edema Management/Control  [x]           Therapeutic Activities            [x]    Patient and Family Training/Education  []           Other (comment):    Frequency/Duration: Patient will be followed by physical therapy  twice daily to address goals. Discharge Recommendations: Rehab  Further Equipment Recommendations for Discharge: TBD     SUBJECTIVE:   Patient stated I am just so weak.     OBJECTIVE DATA SUMMARY:   HISTORY:    Past Medical History:   Diagnosis Date    Anxiety state, unspecified     Arthritis     Cancer ([facility] [address] 2001    prostate,     Depression     GERD (gastroesophageal reflux disease)     Hiatal hernia     Hypercholesteremia     Renal calculus     Seasonal allergic rhinitis      Past Surgical History:   Procedure Laterality Date    ENDOSCOPY, COLON, DIAGNOSTIC      HX APPENDECTOMY      HX CATARACT REMOVAL  2012    HX COLONOSCOPY      HX ENDOSCOPY      HX GI      colonoscopy    HX KNEE REPLACEMENT  02/18/14    right total knee    HX KNEE REPLACEMENT Left 10/2015    HX LITHOTRIPSY  11/2011    HX ORTHOPAEDIC  1992    rt. carpal tunnel    HX PROSTATECTOMY  2003    bowel incontinence since surgery    HX TONSILLECTOMY       Prior Level of Function/Home Situation: Lives with daughter and patient states she and JUAN are home most of the day. Personal factors and/or comorbidities impacting plan of care:     Home Situation  Home Environment: Private residence  # Steps to Enter: 3  One/Two Story Residence: One story  Living Alone: No  Support Systems: Child(key), Family member(s)  Patient Expects to be Discharged to[de-identified] Private residence  Current DME Used/Available at Home: Walker, rolling    EXAMINATION/PRESENTATION/DECISION MAKING:   Critical Behavior:  Neurologic State: Alert  Orientation Level: Oriented X4  Cognition: Follows commands, Appropriate safety awareness     Hearing: Auditory  Auditory Impairment: None, Hard of hearing, bilateral    Range Of Motion:  AROM: Generally decreased, functional (R hip/knee NT)                       Strength:    Strength: Generally decreased, functional (R hip/knee NT)                    Tone & Sensation:   Tone: Normal                              Coordination:  Coordination: Generally decreased, functional  Vision:      Functional Mobility:  Bed Mobility:     Supine to Sit: Moderate assistance;Assist x2  Sit to Supine: Moderate assistance;Assist x2  Scooting: Contact guard assistance; Additional time (EOB)  Transfers:  Sit to Stand: Maximum assistance;Assist x2  Stand to Sit: Moderate assistance;Assist x2                       Balance:   Sitting: Intact; Without support  Standing: Impaired  Standing - Static: Fair;Constant support  Standing - Dynamic : Poor  Ambulation/Gait Training:                    Right Side Weight Bearing: As tolerated  Left Side Weight Bearing: Full                                 Stairs: Therapeutic Exercises:   Reviewed QS, ankle pumps and heel slides but patient required AAROM for all      Functional Measure:  Timed up and go:    Timed Get Up And Go Test: 0     Timed Up and Go and G-code impairment scale:  Percentage of Impairment CH    0%   CI    1-19% CJ    20-39% CK    40-59% CL    60-79% CM    80-99% CN     100%   Timed   Score 0-56 10 11-12 13-14 15-16 17-18 19 20       < than 10 seconds=Normal  Greater then 13.5 seconds (in elderly)=Increased fall risk   Brenda Tellez [place] M. Predicting the probability for falls in community dwelling older adults using the Timed Up and Go Test. Phys Ther. 2000;80:896-903. G codes: In compliance with CMSs Claims Based Outcome Reporting, the following G-code set was chosen for this patient based on their primary functional limitation being treated: The outcome measure chosen to determine the severity of the functional limitation was the TUG with a score of unable which was correlated with the impairment scale.     ? Mobility - Walking and Moving Around:     - CURRENT STATUS: CN - 100% impaired, limited or restricted    - GOAL STATUS: CJ - 20%-39% impaired, limited or restricted    - D/C STATUS:  ---------------To be determined---------------      Physical Therapy Evaluation Charge Determination   History Examination Presentation Decision-Making   HIGH Complexity :3+ comorbidities / personal factors will impact the outcome/ POC  MEDIUM Complexity : 3 Standardized tests and measures addressing body structure, function, activity limitation and / or participation in recreation  MEDIUM Complexity : Evolving with changing characteristics  Other outcome measures TUG 0  HIGH       Based on the above components, the patient evaluation is determined to be of the following complexity level: MEDIUM    Pain:                    Activity Tolerance:   Low, standing x 15 sec only    Please refer to the flowsheet for vital signs taken during this treatment. After treatment:   []         Patient left in no apparent distress sitting up in chair  [x]         Patient left in no apparent distress in bed  [x]         Call bell left within reach  [x]         Nursing notified  []         Caregiver present  []         Bed alarm activated    COMMUNICATION/EDUCATION:   The patients plan of care was discussed with: Registered Nurse. [x]         Fall prevention education was provided and the patient/caregiver indicated understanding. []         Patient/family have participated as able in goal setting and plan of care. [x]         Patient/family agree to work toward stated goals and plan of care. []         Patient understands intent and goals of therapy, but is neutral about his/her participation. []         Patient is unable to participate in goal setting and plan of care.     Thank you for this referral.  Mohan Steele, PT   Time Calculation: 30 mins

## 2018-02-01 NOTE — PROGRESS NOTES
Nutrition Assessment:    RECOMMENDATIONS/INTERVENTION(S):   1. Advance diet to Regular to promote PO    2.  Start Ensure High Protein as PM Snack once diet allows    3. Will monitor PO intake & tolerance, labs, skin integrity, wt  ASSESSMENT:   2/1:  Consult received for general nutrition management & supplements. 81 yom admitted for periprosthetic femur fx. S/p FEMUR OPEN REDUCTION INTERNAL FIXATION WITH STEM EXCHANGE RIGHT. Surgical wound to R hip, no edema noted. Labs/meds reviewed. A1c 5.2%. NS IVF. Last BM unknown. On bowel regimen. Overwt per advanced age. Wt stable per wt hx. Diet advanced to Clears. Attempted visit w/ pt, w/ therapy services. Spoke w/ RN - pt w/ reflux this am, Pepcid given. Noted pt was seen by RD & ortho post-op nutrition recs were provided 1/16/18. Balanced meals & emphasis on meeting increased protein needs was relayed. Ensure HP ordered. Will f/u for diet concerns or questions. SUBJECTIVE/OBJECTIVE:     Diet Order: Clear liquids  % Eaten:  No data found. Pertinent Medications: [x] Reviewed  Past Medical History:   Diagnosis Date    Anxiety state, unspecified     Arthritis     Cancer ([facility] [address] 2001    prostate,     Depression     GERD (gastroesophageal reflux disease)     Hiatal hernia     Hypercholesteremia     Renal calculus     Seasonal allergic rhinitis        Chemistries:  Lab Results   Component Value Date/Time    Sodium 138 02/01/2018 02:46 AM    Potassium 4.4 02/01/2018 02:46 AM    Chloride 103 02/01/2018 02:46 AM    CO2 29 02/01/2018 02:46 AM    Anion gap 6 02/01/2018 02:46 AM    Glucose 114 02/01/2018 02:46 AM    BUN 9 02/01/2018 02:46 AM    Creatinine 1.07 02/01/2018 02:46 AM    BUN/Creatinine ratio 8 02/01/2018 02:46 AM    GFR est AA >60 02/01/2018 02:46 AM    GFR est non-AA >60 02/01/2018 02:46 AM    Calcium 8.3 02/01/2018 02:46 AM    AST (SGOT) 18 01/04/2018 11:28 AM    Alk.  phosphatase 117 01/04/2018 11:28 AM    Protein, total 7.0 01/04/2018 11:28 AM    Albumin 3.3 01/04/2018 11:28 AM    Globulin 3.7 01/04/2018 11:28 AM    A-G Ratio 0.9 01/04/2018 11:28 AM    ALT (SGPT) 21 01/04/2018 11:28 AM      Anthropometrics: Height: 5' 7\" (170.2 cm) Weight: 94.3 kg (207 lb 14.3 oz)    IBW (%IBW): 74 kg (163 lb 2.3 oz) (127.43 %) UBW (%UBW):   (  %)    BMI: Body mass index is 32.56 kg/(m^2). This BMI is indicative of:   [] Underweight    [] Normal    [x] Overweight - per advanced age   []  Obesity    []  Extreme Obesity (BMI>40)  Estimated Nutrition Needs (Based on): 6035 Kcals/day (-2108 (BMR (1622) x 1.3 AF (-250)) , 88 g (-103 (1.2-1.4 g/kg x IBW)) Protein  Carbohydrate: At Least 130 g/day  Fluids: 9829-6945 mL/day    Last BM: ?, abd WDL   []Active     []Hyperactive  []Hypoactive       [] Absent   BS - not documented  Skin:    [] Intact   [x] Incision - R hip  [] Breakdown   [] DTI   [] Tears/Excoriation/Abrasion  []Edema [] Other:      Wt Readings from Last 30 Encounters:   02/01/18 94.3 kg (207 lb 14.3 oz)   01/16/18 94.8 kg (208 lb 15.9 oz)   01/04/18 99.7 kg (219 lb 12.8 oz)   10/20/15 98.9 kg (218 lb)   10/13/15 98.9 kg (218 lb)   09/17/14 97.5 kg (215 lb)   09/11/14 98 kg (216 lb)   09/05/14 98.2 kg (216 lb 6.4 oz)   07/21/14 98.2 kg (216 lb 6.4 oz)   06/02/14 96.4 kg (212 lb 9.6 oz)   04/02/14 91 kg (200 lb 9.6 oz)   03/18/14 93.3 kg (205 lb 9.6 oz)   02/18/14 94.3 kg (208 lb)   02/05/14 94.3 kg (208 lb)   01/16/14 97.5 kg (215 lb)   10/31/13 94.6 kg (208 lb 9.6 oz)   10/14/13 98 kg (216 lb)   09/26/13 93 kg (205 lb)   07/05/13 96.3 kg (212 lb 3.2 oz)   07/02/13 95.4 kg (210 lb 6.4 oz)   04/26/13 97.5 kg (215 lb)   04/23/13 97.5 kg (215 lb)   03/05/13 93.5 kg (206 lb 3.2 oz)   02/18/13 95 kg (209 lb 6.4 oz)   02/12/13 93.7 kg (206 lb 9.6 oz)   12/31/12 94.3 kg (208 lb)   09/28/12 93.2 kg (205 lb 6.4 oz)   09/14/12 93.3 kg (205 lb 9.6 oz)   09/05/12 92.7 kg (204 lb 6 oz)   08/23/12 94.8 kg (209 lb)      NUTRITION DIAGNOSES:   Problem:  Increased nutrient needs     Etiology: related to increased need for protein     Signs/Symptoms: as evidenced by impaired skin integrity - surgical wound to R hip      NUTRITION INTERVENTIONS:  Meals/Snacks: General/healthful diet   Supplements: Commercial supplement              GOAL:   Consume/tolerate >50% of meals, ONS in the next 2-4 days    Cultural, Buddhist, or Ethnic Dietary Needs: None     EDUCATION & DISCHARGE NEEDS:    [x] None Identified - pt received ortho post-op diet recs 2 wks ago   [] Identified and Education Provided/Documented   [] Identified and Pt declined/was not appropriate      [x] Interdisciplinary Care Plan Reviewed/Documented    [x] Discharge Needs:    See D/C note   [] No Nutrition Related Discharge Needs    NUTRITION RISK:   Pt Is At Nutrition Risk  [x]     No Nutrition Risk Identified  []       PT SEEN FOR:    [x]  MD Consult: []Calorie Count      []Diabetic Diet Education        []Diet Education     []Electrolyte Management     [x]General Nutrition Management and Supplements     []Management of Tube Feeding     []TPN Recommendations    []  RN Referral:  []MST score >=2     []Enteral/Parenteral Nutrition PTA     []Pregnant: Gestational DM or Multigestation                 [] Pressure Ulcer    []  Low BMI      []  Length of Stay       [] Dysphagia Diet         [] Ventilator  []  Follow-up     Previous Recommendations:   [] Implemented          [] Not Implemented          [x] Not Applicable    Previous Goal:   [] Met              [] Progressing Towards Goal              [] Not Progressing Towards Goal   [x] Not Applicable            Janneth Duong, [address] [place] [address]  Pager [phone]

## 2018-02-01 NOTE — PROGRESS NOTES
2/1/2018 5:05 PM [facility] will not have a bed available until Sunday. Spoke with pt's daughters, Joe Encarnacion and JEANINE([phone]) regarding discharge and pt's denial for [facility]. Pt's daughters were understanding but requested [facility] reconsider their denial. CM has escalated this to CM management to discuss with [facility]. When discussing denial with pt's daughter Joe Encarnacion she was agreeable to CM looking into [facility] for possible placement. CM sent referral to [facility] via [employer]. Spoke with GregJuanis in admissions at [facility] who reported she will follow up with CM on 2/2 regarding admission decision. CM will follow. 2/1/2018  12:28 PM Spoke with Alexis Álvarez in admissions at [facility] they have denied pt. Alexis Álvarez reported pt's wife is currently Medicaid pending at their facility and their  will not accept pt for short term rehab. CM explained pt will not need long term placement, Alexis Álvarez reported she has discussed with their  and their  and pt is still declined. Met with pt and discussed denial from [id number] [facility]. Pt was understanding, discussed [place] again with pt, pt declined due to distance. Pt selected [facility] as preference. Referral sent to [facility] via [employer]. CM called and discussed pt with Gumaro Thibodeaux in admissions at [address] [facility]. CM called pt's daughter, Joe Encarnacion again, no answer. CM will follow up. 2/1/2018 11:38 AM Notified Kimo Story, pt could possibly be ready for discharge today. Kimo Story reported referral is still being reviewed and she will follow up. 2/1/2018 11:09 AM [facility] RN notified of plan for [facility]. 2/1/2018 10:58 AM Case management consult for SNF placement received. Met with pt to discuss options, relayed Dr. Jennifer Berumen preference of [place](in note from today).  Pt reported his wife is at [id number] [facility] and this would be his preference. Referral sent via [employer]. CM called and spoke with Vanesa Else in admissions at [facility] and notified of referral.   CM also called and lvm with pt's daughter, Christina Kurtz at [phone]. CM will follow.  GAVIN Greer

## 2018-02-01 NOTE — PROGRESS NOTES
TOTAL HIP ARTHROPLASTY DAILY NOTE     ASSESSMENT / PLAN :   1. Pain Control : Acceptable - Some pain at times, but the patient does not wish to increase their medications  2. Overnight Issues : Hard of hearing and mild dementia  3. Wound or incisional issue : Healing incision with no visible drainage  4. Therapy / Weight Bearing Recommendations : Weight bear as tolerated with use of a walker and two person assist while mobilizing, hip precautions  5. DVT Prophylaxis : Mechanical and Lovenox and mechanical lower extremity compression device  6. Disposition : [facility] with daily rehab, would like to avoid prolonged stay in rehab. [place] should also be a consideration. 7. Medical Concerns : Stable, hard of hearing, dementia  8. Comments : Stable this am s/p revision for laura-prosthetic fracture       POD  1 Day Post-Op s/p Procedure(s): FEMUR OPEN REDUCTION INTERNAL FIXATION WITH STEM EXCHANGE RIGHT     SUBJECTIVE :     Concerns : Stable, none. OBJECTIVE :     Vitals:    01/31/18 2232 01/31/18 2314 01/31/18 2322 02/01/18 0255   BP: 146/67  135/67 123/57   Pulse: 98 90 99 92   Resp: 16  16 16   Temp: 98.2 °F (36.8 °C)  98.8 °F (37.1 °C) 98.8 °F (37.1 °C)   SpO2: 95%  95% 91%   Weight:       Height:           Alert and oriented x3. right exam of the hip reveals that the dressing is clean, dry and intact. The patient is able to fire the quadriceps / flex at the hip  Sensation is intact to light touch. No calf pain. Labs:  Recent Labs      02/01/18   0246   01/30/18   1632   HGB  9.7*   --   12.2   HCT   --    --   38.7   NA  138   --   139   K  4.4   --   4.3   CL  103   --   102   CO2  29   --   29   BUN  9   --   14   CREA  1.07   < >  1.35*   GLU  114*   --   135*    < > = values in this interval not displayed.         Patient mobility           Edelmira Douglas MD  Cell [phone]  Dallas Herndon PA-C  Cell [phone]  Medical Staff : Kelly Talbert  Office : [phone]

## 2018-02-01 NOTE — PROGRESS NOTES
Problem: Self Care Deficits Care Plan (Adult)  Goal: *Acute Goals and Plan of Care (Insert Text)  Occupational Therapy Goals  Initiated 2/1/2018  1. Patient will perform self-feeding with modified independence within 7 day(s). 2.  Patient will perform static sitting edge of bed > or = 10 minutes with supervision and minimal complaint of pain within 7 day(s). 3.  Patient will perform sit to stand with minimal assist x's 2 and maintain > or = 1 minute within 7 day(s). 4.  Patient will perform bilateral UE strengthening exercises with min cues for > or = 10 minutes within 7 day(s). 5.  Patient will perform UE dressing seated edge of bed with CGA within 7 day(s). Occupational Therapy EVALUATION  Patient: Jose Hamilton (87 y.o. male)  Date: 2/1/2018  Primary Diagnosis: Periprosthetic fracture of femur following total replacement of hip, initial encounter  Left Allison Prosthetic fracture  Periprosthetic fracture around internal prosthetic joint  Procedure(s) (LRB):  FEMUR OPEN REDUCTION INTERNAL FIXATION WITH STEM EXCHANGE RIGHT (Right) 1 Day Post-Op   Precautions: Fall, skin, WBAT       ASSESSMENT :  Based on the objective data described below, the patient presents with decreased arousal after pain med, however continued pain. Patient overall total assist with all LE ADL's, max assist of 2 for bed mobility and unable to tolerate standing at this time. Family present and verbalized frustration over course of events. Patient limited by pain,      increased demand on UE's due to decreased LE AROM and strength. Recommend rehab at discharge as family unable to care for him at this level. Patient will benefit from skilled intervention to address the above impairments.   Patients rehabilitation potential is considered to be Fair  Factors which may influence rehabilitation potential include:   []             None noted  []             Mental ability/status  [x]             Medical condition  []             Home/family situation and support systems  []             Safety awareness  [x]             Pain tolerance/management  [x]             Other: weight bearing status     PLAN :  Recommendations and Planned Interventions:  [x]               Self Care Training                  [x]        Therapeutic Activities  [x]               Functional Mobility Training    []        Cognitive Retraining  [x]               Therapeutic Exercises           [x]        Endurance Activities  [x]               Balance Training                   []        Neuromuscular Re-Education  []               Visual/Perceptual Training     [x]   Home Safety Training  [x]               Patient Education                 [x]        Family Training/Education  []               Other (comment):    Frequency/Duration: Patient will be followed by occupational therapy 5 times a week to address goals. Discharge Recommendations: [facility]  Further Equipment Recommendations for Discharge: to be determined     SUBJECTIVE:   Patient stated the left side of my leg. re: numbness in right LE    OBJECTIVE DATA SUMMARY:   HISTORY:   Past Medical History:   Diagnosis Date    Anxiety state, unspecified     Arthritis     Cancer ([facility] [address] 2001    prostate,     Depression     GERD (gastroesophageal reflux disease)     Hiatal hernia     Hypercholesteremia     Renal calculus     Seasonal allergic rhinitis      Past Surgical History:   Procedure Laterality Date    ENDOSCOPY, COLON, DIAGNOSTIC      HX APPENDECTOMY      HX CATARACT REMOVAL  2012    HX COLONOSCOPY      HX ENDOSCOPY      HX GI      colonoscopy    HX KNEE REPLACEMENT  02/18/14    right total knee    HX KNEE REPLACEMENT Left 10/2015    HX LITHOTRIPSY  11/2011    HX ORTHOPAEDIC  1992    rt. carpal tunnel    HX PROSTATECTOMY  2003    bowel incontinence since surgery    HX TONSILLECTOMY         Prior Level of Function/Environment/Context: right THR anterior approach 1/15/18, home with his daughter who reports he needed physical assist for sit to stand and ADL mobility at home, assist for all LE ADL's, patient gradually required increased assist and felt his hip \"wasn't right' went to [facility] and had x-ray, upon returning home family reports he had a pop during mobility into the house and unable to walk since. Home Situation  Home Environment: Private residence  # Steps to Enter: 3  One/Two Story Residence: One story  Living Alone: No  Support Systems: Child(key), Family member(s)  Patient Expects to be Discharged to[de-identified] Private residence  Current DME Used/Available at Home: [employer]  []  Right hand dominant   []  Left hand dominant    EXAMINATION OF PERFORMANCE DEFICITS:  Cognitive/Behavioral Status:  Neurologic State: Drowsy  Orientation Level: Oriented X4  Cognition: Follows commands; Appropriate safety awareness  Perception: Appears intact  Perseveration: No perseveration noted  Safety/Judgement: Awareness of environment; Fall prevention;Home safety    Skin: intact    Edema: right LE    Hearing: Auditory  Auditory Impairment: Hard of hearing, bilateral, Hearing aid(s)  Hearing Aids/Status: Right    Vision/Perceptual:                                Corrective Lenses: Glasses    Range of Motion:  AROM: Generally decreased, functional (R hip/knee NT)                         Strength:  Strength: Generally decreased, functional (R hip/knee NT)                Coordination:  Coordination: Generally decreased, functional            Tone & Sensation:  Tone: Normal   Sensation: reports numbness left side of right LE     Balance:  Sitting: Intact; Without support  Standing: Impaired  Standing - Static: Fair;Constant support  Standing - Dynamic : Poor    Functional Mobility and Transfers for ADLs:  Bed Mobility:  Rolling: Total assistance  Supine to Sit: Moderate assistance;Assist x2  Sit to Supine: Moderate assistance;Assist x2  Scooting: Contact guard assistance; Additional time (EOB)    Transfers:  Sit to Stand: Total assistance  Stand to Sit: Total assistance  Bed to Chair: Total assistance  Toilet Transfer : Total assistance    ADL Assessment:  Feeding: Setup;Minimum assistance (decreased arousal and confusion after meds)    Oral Facial Hygiene/Grooming: Minimum assistance    Bathing: Total assistance    Upper Body Dressing: Maximum assistance    Lower Body Dressing: Total assistance    Toileting: Total assistance                ADL Intervention and task modifications:     Educated patient and family on positioning in supine, role of OT, precautions and need for assist x's 2 at this time       Cognitive Retraining  Safety/Judgement: Awareness of environment; Fall prevention;Home safety    Therapeutic Exercise: Instructed to continue ankle pumps   Functional Measure:  Barthel Index:    Bathin  Bladder: 0  Bowels: 5  Groomin  Dressin  Feedin  Mobility: 0  Stairs: 0  Toilet Use: 0  Transfer (Bed to Chair and Back): 0  Total: 5       Barthel and G-code impairment scale:  Percentage of impairment CH  0% CI  1-19% CJ  20-39% CK  40-59% CL  60-79% CM  80-99% CN  100%   Barthel Score 0-100 100 99-80 79-60 59-40 20-39 1-19   0   Barthel Score 0-20 20 17-19 13-16 9-12 5-8 1-4 0      The Barthel ADL Index: Guidelines  1. The index should be used as a record of what a patient does, not as a record of what a patient could do. 2. The main aim is to establish degree of independence from any help, physical or verbal, however minor and for whatever reason. 3. The need for supervision renders the patient not independent. 4. A patient's performance should be established using the best available evidence. Asking the patient, friends/relatives and nurses are the usual sources, but direct observation and common sense are also important. However direct testing is not needed. 5. Usually the patient's performance over the preceding 24-48 hours is important, but occasionally longer periods will be relevant.   6. Middle categories imply that the patient supplies over 50 per cent of the effort. 7. Use of aids to be independent is allowed. Mary Anne Arechiga., Barthel, D.W. (2344). Functional evaluation: the Barthel Index. [address] (14)2. TAMICA Bailey [place]., Michael Nobles., [place], [address] (1999). Measuring the change indisability after inpatient rehabilitation; comparison of the responsiveness of the Barthel Index and Functional [place] Measure. Journal of Neurology, Neurosurgery, and Psychiatry, 66(4), 626-928. Elena Bright, NMILAN.A, MARCEL Encarnacion, & Laurie Toney M.A. (2004.) Assessment of post-stroke quality of life in cost-effectiveness studies: The usefulness of the Barthel Index and the EuroQoL-5D. Quality of Life Research, 13, 255-31         G codes: In compliance with CMSs Claims Based Outcome Reporting, the following G-code set was chosen for this patient based on their primary functional limitation being treated: The outcome measure chosen to determine the severity of the functional limitation was the Barthel Index with a score of 5/100 which was correlated with the impairment scale. ?  Self Care:     - CURRENT STATUS: CM - 80%-99% impaired, limited or restricted    - GOAL STATUS: CL - 60%-79% impaired, limited or restricted    - D/C STATUS:  ---------------To be determined---------------     Occupational Therapy Evaluation Charge Determination   History Examination Decision-Making   LOW Complexity : Brief history review  LOW Complexity : 1-3 performance deficits relating to physical, cognitive , or psychosocial skils that result in activity limitations and / or participation restrictions  LOW Complexity : No comorbidities that affect functional and no verbal or physical assistance needed to complete eval tasks       Based on the above components, the patient evaluation is determined to be of the following complexity level: LOW   Pain:   right hip, repositioned, pain meds given         Activity Tolerance:   Poor, due to pain  Please refer to the flowsheet for vital signs taken during this treatment. After treatment:   [] Patient left in no apparent distress sitting up in chair  [x] Patient left in no apparent distress in bed  [x] Call bell left within reach  [x] Nursing notified  [x] Caregiver present  [] Bed alarm activated    COMMUNICATION/EDUCATION:   The patients plan of care was discussed with: Physical Therapist and Registered Nurse. [x] Home safety education was provided and the patient/caregiver indicated understanding. [x] Patient/family have participated as able in goal setting and plan of care. [] Patient/family agree to work toward stated goals and plan of care. [] Patient understands intent and goals of therapy, but is neutral about his/her participation. [] Patient is unable to participate in goal setting and plan of care. This patients plan of care is appropriate for delegation to hospitals.     Thank you for this referral.  Elinor Finney OTR/DIXON  Time Calculation: 22 mins

## 2018-02-01 NOTE — PROGRESS NOTES
Bedside and Verbal shift change report given to Laurent Bahena RN (oncoming nurse) by Cuca Guzmán RN (offgoing nurse). Report included the following information SBAR, Kardex, Procedure Summary, Intake/Output and MAR.

## 2018-02-02 LAB
CREAT SERPL-MCNC: 1.02 MG/DL (ref 0.7–1.3)
HGB BLD-MCNC: 7.6 G/DL (ref 12.1–17)

## 2018-02-02 PROCEDURE — 74011250636 HC RX REV CODE- 250/636: Performed by: ORTHOPAEDIC SURGERY

## 2018-02-02 PROCEDURE — 97116 GAIT TRAINING THERAPY: CPT

## 2018-02-02 PROCEDURE — 82565 ASSAY OF CREATININE: CPT | Performed by: ORTHOPAEDIC SURGERY

## 2018-02-02 PROCEDURE — 97530 THERAPEUTIC ACTIVITIES: CPT

## 2018-02-02 PROCEDURE — 77010033678 HC OXYGEN DAILY

## 2018-02-02 PROCEDURE — 74011250637 HC RX REV CODE- 250/637: Performed by: ORTHOPAEDIC SURGERY

## 2018-02-02 PROCEDURE — 97535 SELF CARE MNGMENT TRAINING: CPT

## 2018-02-02 PROCEDURE — 36415 COLL VENOUS BLD VENIPUNCTURE: CPT | Performed by: ORTHOPAEDIC SURGERY

## 2018-02-02 PROCEDURE — 74011250636 HC RX REV CODE- 250/636: Performed by: PHYSICIAN ASSISTANT

## 2018-02-02 PROCEDURE — 74011250637 HC RX REV CODE- 250/637: Performed by: PHYSICIAN ASSISTANT

## 2018-02-02 PROCEDURE — 65660000000 HC RM CCU STEPDOWN

## 2018-02-02 PROCEDURE — 85018 HEMOGLOBIN: CPT | Performed by: ORTHOPAEDIC SURGERY

## 2018-02-02 RX ADMIN — METOCLOPRAMIDE HYDROCHLORIDE 10 MG: 10 TABLET ORAL at 06:33

## 2018-02-02 RX ADMIN — Medication 10 ML: at 21:09

## 2018-02-02 RX ADMIN — POLYETHYLENE GLYCOL 3350 17 G: 17 POWDER, FOR SOLUTION ORAL at 08:26

## 2018-02-02 RX ADMIN — BUSPIRONE HYDROCHLORIDE 10 MG: 5 TABLET ORAL at 08:25

## 2018-02-02 RX ADMIN — SODIUM CHLORIDE 50 ML/HR: 900 INJECTION, SOLUTION INTRAVENOUS at 16:17

## 2018-02-02 RX ADMIN — Medication 10 ML: at 06:33

## 2018-02-02 RX ADMIN — Medication 10 ML: at 14:00

## 2018-02-02 RX ADMIN — METOCLOPRAMIDE HYDROCHLORIDE 10 MG: 10 TABLET ORAL at 21:08

## 2018-02-02 RX ADMIN — EZETIMIBE 10 MG: 10 TABLET ORAL at 08:25

## 2018-02-02 RX ADMIN — ACETAMINOPHEN 1000 MG: 500 TABLET ORAL at 04:50

## 2018-02-02 RX ADMIN — TRAMADOL HYDROCHLORIDE 50 MG: 50 TABLET, FILM COATED ORAL at 13:25

## 2018-02-02 RX ADMIN — SIMVASTATIN 40 MG: 20 TABLET, FILM COATED ORAL at 21:08

## 2018-02-02 RX ADMIN — MONTELUKAST SODIUM 10 MG: 10 TABLET, FILM COATED ORAL at 21:08

## 2018-02-02 RX ADMIN — FLUOXETINE 20 MG: 20 CAPSULE ORAL at 08:27

## 2018-02-02 RX ADMIN — GABAPENTIN 300 MG: 300 CAPSULE ORAL at 21:08

## 2018-02-02 RX ADMIN — TRAMADOL HYDROCHLORIDE 50 MG: 50 TABLET, FILM COATED ORAL at 00:32

## 2018-02-02 RX ADMIN — ENOXAPARIN SODIUM 40 MG: 40 INJECTION SUBCUTANEOUS at 08:25

## 2018-02-02 RX ADMIN — GABAPENTIN 300 MG: 300 CAPSULE ORAL at 16:17

## 2018-02-02 RX ADMIN — FAMOTIDINE 20 MG: 20 TABLET, FILM COATED ORAL at 18:00

## 2018-02-02 RX ADMIN — GABAPENTIN 300 MG: 300 CAPSULE ORAL at 08:26

## 2018-02-02 RX ADMIN — BUSPIRONE HYDROCHLORIDE 10 MG: 5 TABLET ORAL at 18:00

## 2018-02-02 RX ADMIN — METOCLOPRAMIDE HYDROCHLORIDE 10 MG: 10 TABLET ORAL at 16:17

## 2018-02-02 RX ADMIN — DOCUSATE SODIUM AND SENNOSIDES 1 TABLET: 8.6; 5 TABLET, FILM COATED ORAL at 08:25

## 2018-02-02 RX ADMIN — FAMOTIDINE 20 MG: 20 TABLET, FILM COATED ORAL at 08:27

## 2018-02-02 RX ADMIN — HYDROMORPHONE HYDROCHLORIDE 1 MG: 2 INJECTION INTRAMUSCULAR; INTRAVENOUS; SUBCUTANEOUS at 16:17

## 2018-02-02 RX ADMIN — HYDROMORPHONE HYDROCHLORIDE 1 MG: 2 INJECTION INTRAMUSCULAR; INTRAVENOUS; SUBCUTANEOUS at 08:26

## 2018-02-02 RX ADMIN — METOCLOPRAMIDE HYDROCHLORIDE 10 MG: 10 TABLET ORAL at 12:30

## 2018-02-02 NOTE — PROGRESS NOTES
Bedside and Verbal shift change report given to Julio Tesfaye RN (oncoming nurse) by Koby Infante RN (offgoing nurse). Report included the following information SBAR, Kardex, Procedure Summary, Intake/Output and MAR.

## 2018-02-02 NOTE — PROGRESS NOTES
TOTAL HIP REVISION ARTHROPLASTY DAILY NOTE     ASSESSMENT / PLAN :   1. Pain Control : Adequate  2. Overnight Issues : mild dementia ([place])  3. Wound or incisional issue : Healing well - no visible drainage  4. Therapy / Weight Bearing Recommendations : Weight bear as tolerated with use of a walker and two person assist while mobilizing, hip precautions  5. DVT Prophylaxis : Mechanical and lovenox  6. Disposition : SNF  7. Medical Concerns : stable - [place],Dementia  8. Comments : Anticipate transfer to SNF once authorization and placement - status pending     POD  2 Days Post-Op s/p Procedure(s): FEMUR OPEN REDUCTION INTERNAL FIXATION WITH STEM EXCHANGE RIGHT     SUBJECTIVE :     Patient notes the following issues : \"They aren't feeding me\". OBJECTIVE :     Patient Vitals for the past 12 hrs:   BP Temp Pulse Resp SpO2   02/02/18 0818 118/55 98 °F (36.7 °C) 85 16 91 %   02/02/18 0443 154/56 99.9 °F (37.7 °C) 91 16 99 %       Alert and oriented x3. Examination of the left Hip reveals that the dressing is clean, dry and intact. Motor Exam is intact and normal  Sensation is intact to light touch. No calf pain. Labs:  Recent Labs      02/02/18   0449  02/01/18   0246   01/30/18   1632   HGB  7.6*  9.7*   --   12.2   HCT   --    --    --   38.7   NA   --   138   --   139   K   --   4.4   --   4.3   CL   --   103   --   102   CO2   --   29   --   29   BUN   --   9   --   14   CREA  1.02  1.07   < >  1.35*   GLU   --   114*   --   135*    < > = values in this interval not displayed.        CULTURES :  Lab Results   Component Value Date/Time    Specimen Description: URINE 02/05/2014 01:41 PM    Specimen Description: NARES 02/05/2014 01:00 PM     Lab Results   Component Value Date/Time    Culture result: Culture performed on Fluid swab specimen 01/31/2018 04:48 PM    Culture result: NO GROWTH ON SOLID MEDIA THUS FAR, HOLDING 14 DAYS 01/31/2018 04:48 PM    Culture result:  01/31/2018 04:48 PM     POSSIBLE STREPTOCOCCUS SPECIES ISOLATED FROM THIO BROTH ONLY    Culture result: No growth thus far, holding 14 days. 01/31/2018 04:48 PM    Culture result: MRSA PRESENT 01/04/2018 11:04 AM    Culture result:  01/04/2018 11:04 AM     CALLED TO AND READ BACK BY   NABEEL BARRON ON 1/7/17 AT 0930      Culture result:  01/04/2018 11:04 AM         Screening of patient nares for MRSA is for surveillance purposes and, if positive, to facilitate isolation considerations in high risk settings. It is not intended for automatic decolonization interventions per se as regimens are not sufficiently effective to warrant routine use.           Patient mobility           Imelda Olmedo MD  Cell [phone]  Edinson King PA-C  Cell [phone]  Medical Staff : [place]  Office : [phone]

## 2018-02-02 NOTE — PROGRESS NOTES
2/2/2018 3:51 PM POSSIBLE WEEKEND TRANSPORT:    [place] [address] [facility] [place]. Nursing please call Via Kenneth Quan in admissions at [facility] at [phone] and notify her of discharge. Please call report to [phone]. Please call pt's daughterTyra at [phone] to notify of discharge. Please arrange ambulance transport through [address] [place] [address] at [phone]. Please add pt's avs, mars, scripts, discharge summary, and most recent progress notes to pt's ambulance packet started on hard chart. 2/2/2018 3:42 PM Per Dr. Carol Waller pt will not discharge today. Dr. Carol Waller will make discharge decision over the weekend. CM called and spoke with Via Kenneth Frost [address] in admissions at [facility] who confirmed they can accept pt over the weekend if ordered. CM called and spoke with pt's daughter, Tyra Lazar and relayed pt will not discharge today. CM called and lvm with pt's daughterJudge Frankel notifying pt will not discharge today. Called and spoke with Chapis at [address] [place] [address] and cancelled transport for today. 2/2/2018  12:34 PM [facility] has accepted pt for admission today per Via Kenneth Frost 81 in admissions. Pt's bed will not be available until 5PM.   2 requirements for admission are having a UAI completed and documented conversation with pt's daughter pt will not be able to be [place] [address] [facility] [place]. CM called and spoke with pt's daughter, Tyra Lazar and relayed [facility] has accepted pt. CM relayed pt will not be able to have LTC at [address] [place] [address]. Pt's daughter was understanding and agreeable. Pt's daughter was agreeable to pt's discharge to [facility] today. CM called and lvm with pt's daughterTaya([id number]) and notified of pt's discharge to [facility] today. UAI completed, ref #  [id number]. CM arranged ambulance transport for 5PM today through [facility]. Nursing please call report [phone]. CM will follow.  GAVIN Nance

## 2018-02-02 NOTE — PROGRESS NOTES
Problem: Mobility Impaired (Adult and Pediatric)  Goal: *Acute Goals and Plan of Care (Insert Text)  Physical Therapy Goals  Initiated 2/1/2018    1. Patient will move from supine to sit and sit to supine  in bed with supervision/set-up within 4 days. 2. Patient will perform sit to stand with minimal contact guard assist within 4 days. 3. Patient will ambulate with minimal assistance/contact guard assist for 150 feet with the least restrictive device within 4 days. 4. Patient will ascend/descend 3 stairs with 1 handrail(s) with minimal assistance/contact guard assist within 4 days. 5. Patient will verbalize and demonstrate understanding of 3/3 precautions per protocol within 4 days. 6. Patient will perform home exercise program per protocol with independence within 4 days. physical Therapy TREATMENT  Patient: Nicolás Staples (89 y.o. 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 ASSESSMENT:  Pt comes to sit with mod to max assist of 2. Pt to stand with mod assist of 2. Pt was able to take three steps forward and backwards with RW mod assist of 2. Pt back to bed with max assist of 2. Pt was more awake and  alert today. Pt progressing with mobility. Continue goals. Progression toward goals:  []      Improving appropriately and progressing toward goals  [x]      Improving slowly and progressing toward goals  []      Not making progress toward goals and plan of care will be adjusted     PLAN:  Patient continues to benefit from skilled intervention to address the above impairments.   Continue treatment per established plan of care. Discharge Recommendations:  [facility]  Further Equipment Recommendations for Discharge:       SUBJECTIVE:       OBJECTIVE DATA SUMMARY:   Critical Behavior:  Neurologic State: Alert  Orientation Level: Oriented to person, Oriented to place, Oriented to situation  Cognition: Follows commands (hard of hearing)  Safety/Judgement: Awareness of environment, Fall prevention, Home safety  Functional Mobility Training:  Bed Mobility:     Supine to Sit: Moderate assistance;Maximum assistance;Assist x2  Sit to Supine: Maximum assistance;Assist x2            Transfers:  Sit to Stand: Moderate assistance;Assist x2  Stand to Sit: Minimum assistance;Assist x2                             Balance:  Sitting: High guard  Standing: With support  Standing - Static: Fair  Ambulation/Gait Training:  Distance (ft): 5 Feet (ft)  Assistive Device: Walker, rolling;Gait belt  Ambulation - Level of Assistance: Moderate assistance;Assist x2        Gait Abnormalities: Decreased step clearance        Base of Support: Narrowed     Speed/[place]yenne: Pace decreased (<100 feet/min)  Step Length: Left shortened;Right shortened                Pain:  Pain Scale 1: Visual  Pain Intensity 1: 0              Activity Tolerance:   Pt tolerated treatment fairly well. Please refer to the flowsheet for vital signs taken during this treatment.   After treatment:   [] Patient left in no apparent distress sitting up in chair  [x] Patient left in no apparent distress in bed  [] Call bell left within reach  [] Nursing notified  [] Caregiver present  [] Bed alarm activated    COMMUNICATION/COLLABORATION:   The patients plan of care was discussed with: Physical Therapist    Petey More PTA   Time Calculation: 23 mins

## 2018-02-02 NOTE — ROUTINE PROCESS
Bedside and Verbal shift change report given to Norma Casanova  (oncoming nurse) by Anju Polanco  (offgoing nurse). Report included the following information SBAR, Kardex, Procedure Summary, Intake/Output, MAR and Recent Results.

## 2018-02-02 NOTE — PROGRESS NOTES
Problem: Self Care Deficits Care Plan (Adult)  Goal: *Acute Goals and Plan of Care (Insert Text)  Occupational Therapy Goals  Initiated 2/1/2018  1. Patient will perform self-feeding with modified independence within 7 day(s). 2.  Patient will perform static sitting edge of bed > or = 10 minutes with supervision and minimal complaint of pain within 7 day(s). 3.  Patient will perform sit to stand with minimal assist x's 2 and maintain > or = 1 minute within 7 day(s). 4.  Patient will perform bilateral UE strengthening exercises with min cues for > or = 10 minutes within 7 day(s). 5.  Patient will perform UE dressing seated edge of bed with CGA within 7 day(s). Occupational Therapy TREATMENT  Patient: Ian Delcid (72 y.o. 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 ASSESSMENT:  Pt able to tolerate sitting edge of bed for 10 minutes to wash his face and bathe anterior UB, min assist to wash his back. Pt doffed/donned hospital gown with contact guard. No complaints of pain with task. Pt was able to verbalize 1/3 precautions and verbal cueing for other 2. Pt was able to scoot to head of bed with min assist. Pt set up for eating his lunch. Recommend SNF for rehab. Progression toward goals:  []          Improving appropriately and progressing toward goals  [x]          Improving slowly and progressing toward goals  []          Not making progress toward goals and plan of care will be adjusted     PLAN:  Patient continues to benefit from skilled intervention to address the above impairments.   Continue treatment per established plan of care. Discharge Recommendations:  SNF for rehab  Further Equipment Recommendations for Discharge: None     SUBJECTIVE:   Patient stated This feels good.     OBJECTIVE DATA SUMMARY:   Cognitive/Behavioral Status:  Neurologic State: Alert  Orientation Level: Oriented to person;Oriented to place;Oriented to situation  Cognition: Follows commands (hard of hearing)           Functional Mobility and Transfers for ADLs:   Bed Mobility:      Mod assist x 2           Transfers:   Not tested       Balance: Intact static sitting edge of bed     ADL Intervention:       Grooming  Washing Face: Supervision/set-up (sitting edge of bed)    Upper Body Bathing  Bathing Assistance: Minimum assistance  Position Performed: Seated edge of bed  Cues: Physical assistance         Upper Body Dressing Assistance  Dressing Assistance: Contact guard assistance  Hospital Gown: Contact guard assistance  Cues: Verbal cues provided    Lower Body Dressing Assistance  Dressing Assistance: Maximum assistance         Pain:  Pain Scale 1: Visual  Pain Intensity 1: 0  Pain Location 1: Hip  Pain Orientation 1: Right  Pain Description 1: Aching  Pain Intervention(s) 1: Medication (see MAR)    Activity Tolerance:    Fair  Please refer to the flowsheet for vital signs taken during this treatment.   After treatment:   []  Patient left in no apparent distress sitting up in chair  [x]  Patient left in no apparent distress in bed  [x]  Call bell left within reach  [x]  Nursing notified  []  Caregiver present  []  Bed alarm activated    COMMUNICATION/COLLABORATION:   The patients plan of care was discussed with: Occupational Therapist and Registered Nurse    DANYEL Santos  Time Calculation: 36 mins

## 2018-02-03 PROBLEM — M97.01XA PERIPROSTHETIC FRACTURE AROUND INTERNAL PROSTHETIC RIGHT HIP JOINT (HCC): Status: ACTIVE | Noted: 2018-02-03

## 2018-02-03 LAB — CREAT SERPL-MCNC: 0.87 MG/DL (ref 0.7–1.3)

## 2018-02-03 PROCEDURE — 97116 GAIT TRAINING THERAPY: CPT

## 2018-02-03 PROCEDURE — 65270000029 HC RM PRIVATE

## 2018-02-03 PROCEDURE — 74011250637 HC RX REV CODE- 250/637: Performed by: ORTHOPAEDIC SURGERY

## 2018-02-03 PROCEDURE — 97530 THERAPEUTIC ACTIVITIES: CPT

## 2018-02-03 PROCEDURE — 97535 SELF CARE MNGMENT TRAINING: CPT

## 2018-02-03 PROCEDURE — 36415 COLL VENOUS BLD VENIPUNCTURE: CPT | Performed by: ORTHOPAEDIC SURGERY

## 2018-02-03 PROCEDURE — 82565 ASSAY OF CREATININE: CPT | Performed by: ORTHOPAEDIC SURGERY

## 2018-02-03 PROCEDURE — 74011250636 HC RX REV CODE- 250/636: Performed by: ORTHOPAEDIC SURGERY

## 2018-02-03 PROCEDURE — 74011250637 HC RX REV CODE- 250/637: Performed by: PHYSICIAN ASSISTANT

## 2018-02-03 PROCEDURE — 77010033678 HC OXYGEN DAILY

## 2018-02-03 RX ADMIN — METOCLOPRAMIDE HYDROCHLORIDE 10 MG: 10 TABLET ORAL at 10:57

## 2018-02-03 RX ADMIN — METOCLOPRAMIDE HYDROCHLORIDE 10 MG: 10 TABLET ORAL at 16:34

## 2018-02-03 RX ADMIN — GABAPENTIN 300 MG: 300 CAPSULE ORAL at 09:46

## 2018-02-03 RX ADMIN — Medication 10 ML: at 06:58

## 2018-02-03 RX ADMIN — GABAPENTIN 300 MG: 300 CAPSULE ORAL at 21:48

## 2018-02-03 RX ADMIN — DOCUSATE SODIUM AND SENNOSIDES 1 TABLET: 8.6; 5 TABLET, FILM COATED ORAL at 09:46

## 2018-02-03 RX ADMIN — EZETIMIBE 10 MG: 10 TABLET ORAL at 09:46

## 2018-02-03 RX ADMIN — METOCLOPRAMIDE HYDROCHLORIDE 10 MG: 10 TABLET ORAL at 21:47

## 2018-02-03 RX ADMIN — FAMOTIDINE 20 MG: 20 TABLET, FILM COATED ORAL at 09:46

## 2018-02-03 RX ADMIN — ENOXAPARIN SODIUM 40 MG: 40 INJECTION SUBCUTANEOUS at 09:46

## 2018-02-03 RX ADMIN — FAMOTIDINE 20 MG: 20 TABLET, FILM COATED ORAL at 18:21

## 2018-02-03 RX ADMIN — METOCLOPRAMIDE HYDROCHLORIDE 10 MG: 10 TABLET ORAL at 06:56

## 2018-02-03 RX ADMIN — DOCUSATE SODIUM AND SENNOSIDES 1 TABLET: 8.6; 5 TABLET, FILM COATED ORAL at 18:21

## 2018-02-03 RX ADMIN — SIMVASTATIN 40 MG: 20 TABLET, FILM COATED ORAL at 21:48

## 2018-02-03 RX ADMIN — BUSPIRONE HYDROCHLORIDE 10 MG: 5 TABLET ORAL at 09:46

## 2018-02-03 RX ADMIN — TRAMADOL HYDROCHLORIDE 50 MG: 50 TABLET, FILM COATED ORAL at 10:57

## 2018-02-03 RX ADMIN — GABAPENTIN 300 MG: 300 CAPSULE ORAL at 16:34

## 2018-02-03 RX ADMIN — MONTELUKAST SODIUM 10 MG: 10 TABLET, FILM COATED ORAL at 20:47

## 2018-02-03 RX ADMIN — POLYETHYLENE GLYCOL 3350 17 G: 17 POWDER, FOR SOLUTION ORAL at 09:46

## 2018-02-03 RX ADMIN — BUSPIRONE HYDROCHLORIDE 10 MG: 5 TABLET ORAL at 18:21

## 2018-02-03 RX ADMIN — TRAMADOL HYDROCHLORIDE 50 MG: 50 TABLET, FILM COATED ORAL at 16:34

## 2018-02-03 RX ADMIN — FLUOXETINE 20 MG: 20 CAPSULE ORAL at 09:46

## 2018-02-03 NOTE — PROGRESS NOTES
Problem: Mobility Impaired (Adult and Pediatric)  Goal: *Acute Goals and Plan of Care (Insert Text)  Physical Therapy Goals  Initiated 2/1/2018    1. Patient will move from supine to sit and sit to supine  in bed with supervision/set-up within 4 days. 2. Patient will perform sit to stand with minimal contact guard assist within 4 days. 3. Patient will ambulate with minimal assistance/contact guard assist for 150 feet with the least restrictive device within 4 days. 4. Patient will ascend/descend 3 stairs with 1 handrail(s) with minimal assistance/contact guard assist within 4 days. 5. Patient will verbalize and demonstrate understanding of 3/3 precautions per protocol within 4 days. 6. Patient will perform home exercise program per protocol with independence within 4 days. physical Therapy TREATMENT  Patient: Marni Hodgkins (85 y.o. 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 ASSESSMENT:  Pt Camryn Casper presents with slowly improving mobility and activity tolerance on POD 3 s/p R femur ORIF due to periprosthetic fracture. Pt oriented x 4 and offers good efforts with PT. He ambulates increased distance, demonstrates improving gait mechanics, and tolerates treatment without complaints.    Progression toward goals:  []    Improving appropriately and progressing toward goals  [x]    Improving slowly and progressing toward goals  []    Not making progress toward goals and plan of care will be adjusted     PLAN:  Patient continues to benefit from skilled intervention to address the above impairments. Continue treatment per established plan of care. Discharge Recommendations:  Rehab  Further Equipment Recommendations for Discharge:  Defer to rehab     SUBJECTIVE:   Patient stated I can't eat laying in the bed.  Pt eager to mobilize to chair to have breakfast.    OBJECTIVE DATA SUMMARY:   Pt received supine, agreeable to PT and cleared by RN, +4NCO2 with RN instruction to wean as tolerated. Critical Behavior:  Neurologic State: Alert  Orientation Level: Oriented X4  Cognition: Follows commands  Safety/Judgement: Awareness of environment, Fall prevention, Home safety        Functional Mobility Training:  Bed Mobility:     Supine to Sit: Maximum assistance;Assist x2; Additional time (one step cues for techniques)     Scooting: Maximum assistance        Transfers:  Sit to Stand: Assist x2; Additional time; Moderate assistance (one step cues for techniques)  Stand to Sit: Assist x2;Minimum assistance; Additional time                             Balance:  Sitting: Without support  Standing: With support (cues for trunk and neck to neutral)  Standing - Static: Fair  Standing - Dynamic : Fair  Ambulation/Gait Training:  Distance (ft): 8 Feet (ft)  Assistive Device: Walker, rolling;Gait belt  Ambulation - Level of Assistance: Moderate assistance        Gait Abnormalities: Antalgic;Decreased step clearance        Base of Support: Widened  Stance: Right decreased  Speed/[place]: Pace decreased (<100 feet/min)  Step Length: Left shortened                  Cues for direction and sequencing                Therapeutic Exercises: Ankle pumps x 10  Pain:  Pain Scale 1: Numeric (0 - 10)  Pain Intensity 1: 0              Activity Tolerance:   No pt complaints. SpO2 90% with activity using room air. Please refer to the flowsheet for vital signs taken during this treatment.   After treatment:   [x] Patient left in no apparent distress sitting up in chair, +cold pack to R incision with toweling, +2LNCO2 with SpO2 95% and RN aware.   []    Patient left in no apparent distress in bed  [x]    Call bell left within reach  [x]    Nursing notified  []    Caregiver present  [x]    Chair alarm activated    COMMUNICATION/COLLABORATION:   The patients plan of care was discussed with: Occupational Therapist and Registered Nurse    Titi Dickinson, PT, DPT   Time Calculation: 23 mins

## 2018-02-03 NOTE — PROGRESS NOTES
Problem: Self Care Deficits Care Plan (Adult)  Goal: *Acute Goals and Plan of Care (Insert Text)  Occupational Therapy Goals  Initiated 2/1/2018  1. Patient will perform self-feeding with modified independence within 7 day(s). 2.  Patient will perform static sitting edge of bed > or = 10 minutes with supervision and minimal complaint of pain within 7 day(s). 3.  Patient will perform sit to stand with minimal assist x's 2 and maintain > or = 1 minute within 7 day(s). 4.  Patient will perform bilateral UE strengthening exercises with min cues for > or = 10 minutes within 7 day(s). 5.  Patient will perform UE dressing seated edge of bed with CGA within 7 day(s). Occupational Therapy TREATMENT  Patient: Rachel Pablo (59 y.o. 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 ASSESSMENT:  Pt received seated in the chair agreeable to working with OT on goals of performing CHG bath and learning to use AE. Pt with well managed pain this session reporting levels up to 3/10 and oxygen saturations WNL on 2 L NC throughout. Able to complete CHG bath seated in the chair with overall MOD A with MOD A X 2 needed for sit to stand. Tolerated standing for dependent hygiene with MIN A X 1 and completed AE training for LB dressing with maximal assist.  MOD a x 2 for transfer to chair.     Progression toward goals:  [x]       Improving appropriately and progressing toward goals  []       Improving slowly and progressing toward goals  []       Not making progress toward goals and plan of care will be adjusted     PLAN:  Patient continues to benefit from skilled intervention to address the above impairments. Continue treatment per established plan of care. Discharge Recommendations:  [facility]  Further Equipment Recommendations for Discharge:  TBD by facility     SUBJECTIVE:   Patient stated My hip feels pretty good today.     OBJECTIVE DATA SUMMARY:   Cognitive/Behavioral Status:  Neurologic State: Alert; Appropriate for age  Orientation Level: Oriented X4  Cognition: Follows commands  Perception: Appears intact  Perseveration: No perseveration noted  Safety/Judgement: Fall prevention    Functional Mobility and Transfers for ADLs:  Bed Mobility:  Supine to Sit: Maximum assistance;Assist x2; Additional time (one step cues for techniques)  Sit to Supine: Maximum assistance;Assist x2  Scooting: Minimum assistance    Transfers:  Sit to Stand: Moderate assistance;Assist x2       Balance:  Sitting: Intact  Standing: Impaired; With support  Standing - Static: Good  Standing - Dynamic : Fair    ADL Intervention:      Grooming  Washing Face: Supervision/set-up (seated in the chair)    Upper Body Bathing  Bathing Assistance: Supervision/set-up  Position Performed: Seated in chair    Lower Body Bathing  Bathing Assistance: Maximum assistance  Perineal  : Total assistance (dependent)  Position Performed: Standing  Adaptive Equipment: Walker  Lower Body : Moderate assistance  Position Performed: Seated in chair    Upper [address]: Supervision/ set-up    Lower Body Dressing Assistance  Socks: Maximum assistance  Position Performed: Seated in chair  Adaptive Equipment Used: Reacher;Sock aid; Walker     Cognitive Retraining  Following Commands:  Follows one step commands/directions  Safety/Judgement: Fall prevention      Pain:  Pain Scale 1: Numeric (0 - 10)  Pain Intensity 1: 0     Activity Tolerance:   No s/s of distress;  O2 pre activity:  95% on 2 L HR 72  During activity:  97% on 2 L HR 83  Post activity: 96% on 2 L    HR 97  Please refer to the flowsheet for vital signs taken during this treatment.   After treatment:   [x] Patient left in no apparent distress sitting up in chair  [] Patient left in no apparent distress in bed  [x] Call bell left within reach  [x] Nursing notified  [] Caregiver present  [x] Bed alarm activated    COMMUNICATION/COLLABORATION:   The patients plan of care was discussed with: Physical Therapist and Certified Nursing Assistant/Patient Care Technician    Corinna Garcia OT  Time Calculation: 35 mins

## 2018-02-03 NOTE — PROGRESS NOTES
Problem: Falls - Risk of  Goal: *Absence of Falls  Document Khris Fall Risk and appropriate interventions in the flowsheet.    Outcome: Progressing Towards Goal  Fall Risk Interventions:  Mobility Interventions: Bed/chair exit alarm    Mentation Interventions: Bed/chair exit alarm    Medication Interventions: Bed/chair exit alarm    Elimination Interventions: Call light in reach    History of Falls Interventions: Bed/chair exit alarm

## 2018-02-03 NOTE — ROUTINE PROCESS
Bedside and Verbal shift change report given to [address] (oncoming nurse) by Daryl Chris (offgoing nurse). Report included the following information SBAR, Kardex and Intake/Output.

## 2018-02-03 NOTE — PROGRESS NOTES
TOTAL HIP REVISION ARTHROPLASTY DAILY NOTE     ASSESSMENT / PLAN :   1. Pain Control : Acceptable  2. Overnight Issues : none  3. Wound or incisional issue : Aquacel dressings intact - no visible drainage on posterior incision. Small amount of bloody drainage noted about the proximal margin of his anterior incision from primary MARYJANE  4. Therapy / Weight Bearing Recommendations : Weight bear as tolerated with use of a walker and two person assist while mobilizing, hip precautions  5. DVT Prophylaxis : Mechanical and lovenox  6. Disposition : SNF - [facility]  7. Medical Concerns : Monitor cultures through the weekend. Strep on thio broth only - see culture report below. 8. Comments : Transfer to SNF on Sunday vs Monday if solid media cultures are both negative     POD  3 Days Post-Op s/p Procedure(s): FEMUR OPEN REDUCTION INTERNAL FIXATION WITH STEM EXCHANGE RIGHT     SUBJECTIVE :     Patient notes the following issues : none reported. OBJECTIVE :     Patient Vitals for the past 12 hrs:   BP Temp Pulse Resp SpO2   02/03/18 0847 - - 89 - 96 %   02/03/18 0723 145/67 99.1 °F (37.3 °C) 83 18 94 %   02/03/18 0651 - - 80 - -   02/03/18 0344 123/80 99 °F (37.2 °C) 82 17 98 %   02/03/18 0024 126/60 98 °F (36.7 °C) 82 16 92 %   02/03/18 0013 - - 80 - -       Alert and oriented x3. Examination of the right. Hip reveals that the dressing is clean, dry and intact. Aquacel dressings intact - no visible drainage on posterior incision. Small amount of bloody drainage noted about the proximal margin of his anterior incision from primary MARYJANE  Motor Exam is intact and normal  Sensation is intact to light touch. No calf pain.      Labs:  Recent Labs      02/03/18   0437  02/02/18   0449  02/01/18   0246   HGB   --   7.6*  9.7*   NA   --    --   138   K   --    --   4.4   CL   --    --   103   CO2   --    --   29   BUN   --    --   9   CREA  0.87  1.02  1.07   GLU   --    --   114*       CULTURES :  Lab Results   Component Value Date/Time    Specimen Description: URINE 02/05/2014 01:41 PM    Specimen Description: NARES 02/05/2014 01:00 PM     Lab Results   Component Value Date/Time    Culture result: Culture performed on Fluid swab specimen 01/31/2018 04:48 PM    Culture result: NO GROWTH ON SOLID MEDIA THUS FAR, HOLDING 14 DAYS 01/31/2018 04:48 PM    Culture result:  01/31/2018 04:48 PM     POSSIBLE STREPTOCOCCUS SPECIES ISOLATED FROM THIO BROTH ONLY    Culture result: No growth thus far, holding 14 days. 01/31/2018 04:48 PM    Culture result: MRSA PRESENT 01/04/2018 11:04 AM    Culture result:  01/04/2018 11:04 AM     CALLED TO AND READ BACK BY   NABEEL BARRON ON 1/7/17 AT 0930      Culture result:  01/04/2018 11:04 AM         Screening of patient nares for MRSA is for surveillance purposes and, if positive, to facilitate isolation considerations in high risk settings. It is not intended for automatic decolonization interventions per se as regimens are not sufficiently effective to warrant routine use. Patient mobility  Gait  Base of Support: Widened  Speed/Cheyenne: Pace decreased (<100 feet/min)  Step Length: Left shortened  Stance: Right decreased  Gait Abnormalities: Antalgic, Decreased step clearance  Ambulation - Level of Assistance:  Moderate assistance  Distance (ft): 8 Feet (ft)  Assistive Device: Walker, rolling, Gait belt        Ava Alvarez MD  Cell [phone], KALLIE  Cell [phone]  Medical Staff : Toyin Correa  Office : [phone]

## 2018-02-04 LAB
CREAT SERPL-MCNC: 0.86 MG/DL (ref 0.7–1.3)
HCT VFR BLD AUTO: 27.8 % (ref 36.6–50.3)
HGB BLD-MCNC: 8.6 G/DL (ref 12.1–17)

## 2018-02-04 PROCEDURE — 97110 THERAPEUTIC EXERCISES: CPT

## 2018-02-04 PROCEDURE — 97116 GAIT TRAINING THERAPY: CPT

## 2018-02-04 PROCEDURE — 74011250636 HC RX REV CODE- 250/636: Performed by: ORTHOPAEDIC SURGERY

## 2018-02-04 PROCEDURE — 82565 ASSAY OF CREATININE: CPT | Performed by: ORTHOPAEDIC SURGERY

## 2018-02-04 PROCEDURE — 97530 THERAPEUTIC ACTIVITIES: CPT

## 2018-02-04 PROCEDURE — 74011250637 HC RX REV CODE- 250/637: Performed by: ORTHOPAEDIC SURGERY

## 2018-02-04 PROCEDURE — 85018 HEMOGLOBIN: CPT | Performed by: PHYSICIAN ASSISTANT

## 2018-02-04 PROCEDURE — 36415 COLL VENOUS BLD VENIPUNCTURE: CPT | Performed by: ORTHOPAEDIC SURGERY

## 2018-02-04 PROCEDURE — 65270000029 HC RM PRIVATE

## 2018-02-04 PROCEDURE — 74011250637 HC RX REV CODE- 250/637: Performed by: PHYSICIAN ASSISTANT

## 2018-02-04 PROCEDURE — 77010033678 HC OXYGEN DAILY

## 2018-02-04 RX ADMIN — Medication 10 ML: at 06:50

## 2018-02-04 RX ADMIN — MONTELUKAST SODIUM 10 MG: 10 TABLET, FILM COATED ORAL at 21:32

## 2018-02-04 RX ADMIN — TRAMADOL HYDROCHLORIDE 50 MG: 50 TABLET, FILM COATED ORAL at 08:19

## 2018-02-04 RX ADMIN — BUSPIRONE HYDROCHLORIDE 10 MG: 5 TABLET ORAL at 08:19

## 2018-02-04 RX ADMIN — FLUOXETINE 20 MG: 20 CAPSULE ORAL at 08:19

## 2018-02-04 RX ADMIN — DOCUSATE SODIUM AND SENNOSIDES 1 TABLET: 8.6; 5 TABLET, FILM COATED ORAL at 08:19

## 2018-02-04 RX ADMIN — FAMOTIDINE 20 MG: 20 TABLET, FILM COATED ORAL at 17:50

## 2018-02-04 RX ADMIN — ENOXAPARIN SODIUM 40 MG: 40 INJECTION SUBCUTANEOUS at 08:19

## 2018-02-04 RX ADMIN — TRAMADOL HYDROCHLORIDE 50 MG: 50 TABLET, FILM COATED ORAL at 21:33

## 2018-02-04 RX ADMIN — FAMOTIDINE 20 MG: 20 TABLET, FILM COATED ORAL at 08:19

## 2018-02-04 RX ADMIN — TRAMADOL HYDROCHLORIDE 50 MG: 50 TABLET, FILM COATED ORAL at 14:12

## 2018-02-04 RX ADMIN — Medication 10 ML: at 21:34

## 2018-02-04 RX ADMIN — GABAPENTIN 300 MG: 300 CAPSULE ORAL at 21:32

## 2018-02-04 RX ADMIN — GABAPENTIN 300 MG: 300 CAPSULE ORAL at 16:39

## 2018-02-04 RX ADMIN — Medication 10 ML: at 14:13

## 2018-02-04 RX ADMIN — METOCLOPRAMIDE HYDROCHLORIDE 10 MG: 10 TABLET ORAL at 16:39

## 2018-02-04 RX ADMIN — EZETIMIBE 10 MG: 10 TABLET ORAL at 08:25

## 2018-02-04 RX ADMIN — DOCUSATE SODIUM AND SENNOSIDES 1 TABLET: 8.6; 5 TABLET, FILM COATED ORAL at 17:50

## 2018-02-04 RX ADMIN — METOCLOPRAMIDE HYDROCHLORIDE 10 MG: 10 TABLET ORAL at 11:45

## 2018-02-04 RX ADMIN — METOCLOPRAMIDE HYDROCHLORIDE 10 MG: 10 TABLET ORAL at 06:49

## 2018-02-04 RX ADMIN — GABAPENTIN 300 MG: 300 CAPSULE ORAL at 08:19

## 2018-02-04 RX ADMIN — SIMVASTATIN 40 MG: 20 TABLET, FILM COATED ORAL at 21:32

## 2018-02-04 RX ADMIN — METOCLOPRAMIDE HYDROCHLORIDE 10 MG: 10 TABLET ORAL at 21:32

## 2018-02-04 RX ADMIN — BUSPIRONE HYDROCHLORIDE 10 MG: 5 TABLET ORAL at 17:50

## 2018-02-04 NOTE — PROGRESS NOTES
Problem: Mobility Impaired (Adult and Pediatric)  Goal: *Acute Goals and Plan of Care (Insert Text)  Physical Therapy Goals  Initiated 2/1/2018    1. Patient will move from supine to sit and sit to supine  in bed with supervision/set-up within 4 days. 2. Patient will perform sit to stand with minimal contact guard assist within 4 days. 3. Patient will ambulate with minimal assistance/contact guard assist for 150 feet with the least restrictive device within 4 days. 4. Patient will ascend/descend 3 stairs with 1 handrail(s) with minimal assistance/contact guard assist within 4 days. 5. Patient will verbalize and demonstrate understanding of 3/3 precautions per protocol within 4 days. 6. Patient will perform home exercise program per protocol with independence within 4 days. physical Therapy TREATMENT  Patient: Elfego Bocanegra (38 y.o. 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 ASSESSMENT:  Patient agrees to get up. States he needs to use the urinal. Patient supine to sit mod A x 2 towards R; sat EOB x 5 min unable to urinate. Sit to stand mod A x 2 up to RW. Using urinal in standing with success. Patient only able to ambulate 4' despite encouragement to go further. Patient states he can't go any further. Patient reversed and turned to chair. Able to tolerate activity without supplemental 02 SATs stable at 91%r. At rest after activity with placed back on 2L NC and SATs 94%.   Patient needs additional time for all functional mobility and has very slow processing and sequencing. Still needs min A for RW management during very short distance during gait. Patient still requiring 2 person physical assistance. Progression toward goals:  []      Improving appropriately and progressing toward goals  [x]      Improving slowly and progressing toward goals  []      Not making progress toward goals and plan of care will be adjusted     PLAN:  Patient continues to benefit from skilled intervention to address the above impairments. Continue treatment per established plan of care. Discharge Recommendations:  PER MD recommendations  Further Equipment Recommendations for Discharge:  TBD     SUBJECTIVE:   I cant walk to the door. I am not supposed to cross my legs or my feet    OBJECTIVE DATA SUMMARY:   Functional Mobility Training:  Bed Mobility:     Supine to Sit: Moderate assistance;Assist x2     Scooting: Moderate assistance;Assist x1        Transfers:  Sit to Stand: Moderate assistance;Assist x2  Stand to Sit: Moderate assistance;Assist x2                             Ambulation/Gait Training:  Distance (ft): 4 Feet (ft)  Assistive Device: Gait belt;Walker, rolling  Ambulation - Level of Assistance: Minimal assistance; Moderate assistance;Assist x2        Gait Abnormalities: Altered arm swing; Step to gait        Base of Support: Widened     Speed/Cheyenne: Slow  Step Length: Left shortened;Right shortened                   Stairs:did not occur             Therapeutic Exercises:   Exercises performed per protocol. See morning treatment note for description. Pain:  Pain Scale 1: Numeric (0 - 10)  Pain Intensity 1: 0  Pain Location 1: Hip  Pain Orientation 1: Right        Activity Tolerance:   See above  Please refer to the flowsheet for vital signs taken during this treatment.   After treatment:   [x] Patient left in no apparent distress sitting up in chair  [] Patient left in no apparent distress in bed  [] Call garcia left within reach  [x] Nursing notified  [] Caregiver present  [x] Chair alarm activated    COMMUNICATION/COLLABORATION:   The patients plan of care was discussed with: Registered Nurse    Carlie French DPT   Time Calculation: 17 mins

## 2018-02-04 NOTE — PROGRESS NOTES
Problem: Mobility Impaired (Adult and Pediatric)  Goal: *Acute Goals and Plan of Care (Insert Text)  Physical Therapy Goals  Initiated 2/1/2018    1. Patient will move from supine to sit and sit to supine  in bed with supervision/set-up within 4 days. 2. Patient will perform sit to stand with minimal contact guard assist within 4 days. 3. Patient will ambulate with minimal assistance/contact guard assist for 150 feet with the least restrictive device within 4 days. 4. Patient will ascend/descend 3 stairs with 1 handrail(s) with minimal assistance/contact guard assist within 4 days. 5. Patient will verbalize and demonstrate understanding of 3/3 precautions per protocol within 4 days. 6. Patient will perform home exercise program per protocol with independence within 4 days. physical Therapy TREATMENT  Patient: Myra Berg (53 y.o. 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 ASSESSMENT:  Patient agrees to get up to chair. Patient denies pain at rest.  Patient on 2 L Supplemental 02 via NC with SATs 95%. Patient's bed mobility mod/max A x 2 for supine to sit towards R. Patient scooting forward mod A x 1; Sit to stand max A x 2 and for stand to sit assistance required to slow his decent. VC for upright posture at RW. Needs VC for sequencing and min A for RW management. The patient was able to take 3-4 steps forward very short steps with decreased WB.   Patient performed seated LE exercises per below. Patient instructed to try and stay in chair x 1 hour. Chair alarm on and nursing aware. Patient will benefit from continued PT to advance his functional mobility as he is currently 2 person assist.    Progression toward goals:  [x]      Improving appropriately and progressing toward goals  []      Improving slowly and progressing toward goals  []      Not making progress toward goals and plan of care will be adjusted     PLAN:  Patient continues to benefit from skilled intervention to address the above impairments. Continue treatment per established plan of care. Discharge Recommendations:  Recommendations per MD  Further Equipment Recommendations for Discharge: Will need RW     SUBJECTIVE:   Patient stated Bautista Ortega will get up.   The patient stated 1/3 hip precautions. Reviewed all 3 with patient. OBJECTIVE DATA SUMMARY:   Critical Behavior:  Neurologic State: Alert, Appropriate for age  Orientation Level: Oriented X4  Cognition: Follows commands  Safety/Judgement: Fall prevention  Functional Mobility Training:  Bed Mobility:     Supine to Sit: Maximum assistance;Assist x2     Scooting: Moderate assistance;Assist x1        Transfers:  Sit to Stand: Maximum assistance;Assist x2  Stand to Sit: Maximum assistance;Assist x2                             Balance:  Sitting: Intact  Standing: Intact; With support  Standing - Static: Fair  Ambulation/Gait Training:  Distance (ft): 4 Feet (ft)  Assistive Device: Gait belt;Walker, rolling  Ambulation - Level of Assistance: Minimal assistance; Moderate assistance;Assist x2        Gait Abnormalities: Antalgic; Altered arm swing; Step to gait        Base of Support: Widened     Speed/Cheyenne: Slow                      Stairs:did not occur             Therapeutic Exercises:   SUPINE  EXERCISES   Sets   Reps   Active Active Assist   Passive Self ROM   Comments   Ankle Pumps 1 5 [x]                                           []                                           [] []                                           B/L LE   Seated long arc quads 1 10 [x]                                           []                                           []                                           []                                           B/l LE   Seated hip flexion (L only) 1 10 [x]                                           []                                           []                                           []                                                  Pain:  Pain Scale 1: Numeric (0 - 10)  Pain Intensity 1: 0              Activity Tolerance:   See above  Please refer to the flowsheet for vital signs taken during this treatment.   After treatment:   [x]  Patient left in no apparent distress sitting up in chair on 2L supplemental 02 via NC, sats 97%  []  Patient left in no apparent distress in bed  [x]  Call bell left within reach  [x]  Nursing notified  []  Caregiver present  [x]  Chair alarm activated    COMMUNICATION/COLLABORATION:   The patients plan of care was discussed with: Registered Nurse    Maryann Sargent DPT   Time Calculation: 15 mins

## 2018-02-04 NOTE — PROGRESS NOTES
Bedside and Verbal shift change report given to [address] (oncoming nurse) by Shea Matthews (offgoing nurse). Report included the following information SBAR, Kardex and Intake/Output.

## 2018-02-04 NOTE — PROGRESS NOTES
Orthopaedic Progress Note  Post Op day: 4 Days Post-Op    2018 12:03 PM     Patient: Roma Castanon MRN: [id number]  SSN: [id number]    YOB: 1935  Age: 81 y.o. Sex: male      Admit date:  2018  Date of Surgery:  2018   Procedures:  Procedure(s): FEMUR OPEN REDUCTION INTERNAL FIXATION WITH STEM EXCHANGE RIGHT  Admitting Physician:  Krysta Martin MD   Surgeon:  Charly Au) and Role:     * Krysta Martin MD - Primary    Consulting Physician(s): Treatment Team: Attending Provider: Krysta Martin MD; Surgeon: Krysta Martin MD; Consulting Provider: Hosea John NP; Care Manager: Cara Dempsey    SUBJECTIVE:     Roma Castanon is a 81 y.o. male is 4 Days Post-Op s/p Procedure(s): FEMUR OPEN REDUCTION INTERNAL FIXATION WITH STEM EXCHANGE RIGHT with an appropriate level of post-operative pain. No complaints of nausea, vomiting, dizziness, lightheadedness, chest pain, or shortness of breath. OBJECTIVE:       Physical Exam:  General: Alert, cooperative, no distress. Respiratory: Respirations unlabored  Neurological:  Neurovascular exam within normal limits. Motor: + DF/PF. Musculoskeletal: Calves soft, supple, non-tender upon palpation. Dressing/Wound:  Clean, dry and intact. No significant erythema or swelling.       Vital Signs:      Patient Vitals for the past 8 hrs:   BP Temp Pulse Resp SpO2   18 0825 157/74 98.2 °F (36.8 °C) 84 16 96 %                                          Temp (24hrs), Av.4 °F (36.9 °C), Min:98.2 °F (36.8 °C), Max:98.7 °F (37.1 °C)      Labs:        Recent Labs      18   1054   HCT  27.8*   HGB  8.6*     Lab Results   Component Value Date/Time    Sodium 138 2018 02:46 AM    Potassium 4.4 2018 02:46 AM    Chloride 103 2018 02:46 AM    CO2 29 2018 02:46 AM    Glucose 114 2018 02:46 AM    BUN 9 2018 02:46 AM    Creatinine 0.86 2018 03:21 AM    Calcium 8.3 2018 02:46 AM       PT/OT:        Gait  Base of Support: Widened  Speed/Cheyenne: Slow  Step Length: Left shortened  Stance: Right decreased  Gait Abnormalities: Antalgic, Altered arm swing, Step to gait  Ambulation - Level of Assistance: Minimal assistance, Moderate assistance, Assist x2  Distance (ft): 4 Feet (ft)  Assistive Device: Gait belt, Walker, rolling       Patient mobility  Bed Mobility Training  Rolling: Total assistance  Supine to Sit: Maximum assistance, Assist x2  Sit to Supine: Maximum assistance, Assist x2  Scooting: Moderate assistance, Assist x1  Transfer Training  Sit to Stand: Maximum assistance, Assist x2  Stand to Sit: Maximum assistance, Assist x2  Bed to Chair: Moderate assistance, Assist x2      Gait Training  Assistive Device: Gait belt, Walker, rolling  Ambulation - Level of Assistance: Minimal assistance, Moderate assistance, Assist x2  Distance (ft): 4 Feet (ft)   Weight Bearing Status  Right Side Weight Bearing: As tolerated  Left Side Weight Bearing: Full        ASSESSMENT / PLAN:   Active Problems:    Allison-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 Stablized   Disposition: Rehab.      Signed By:  Joneen Seip, PA-C    [address] [place] [address]

## 2018-02-05 VITALS
WEIGHT: 207.89 LBS | DIASTOLIC BLOOD PRESSURE: 54 MMHG | BODY MASS INDEX: 32.63 KG/M2 | OXYGEN SATURATION: 94 % | HEIGHT: 67 IN | SYSTOLIC BLOOD PRESSURE: 110 MMHG | HEART RATE: 84 BPM | TEMPERATURE: 98.3 F | RESPIRATION RATE: 18 BRPM

## 2018-02-05 LAB
ANION GAP SERPL CALC-SCNC: 6 MMOL/L (ref 5–15)
BASOPHILS # BLD: 0.1 K/UL (ref 0–0.1)
BASOPHILS NFR BLD: 1 % (ref 0–1)
BUN SERPL-MCNC: 10 MG/DL (ref 6–20)
BUN/CREAT SERPL: 11 (ref 12–20)
CALCIUM SERPL-MCNC: 8.9 MG/DL (ref 8.5–10.1)
CHLORIDE SERPL-SCNC: 103 MMOL/L (ref 97–108)
CO2 SERPL-SCNC: 33 MMOL/L (ref 21–32)
CREAT SERPL-MCNC: 0.89 MG/DL (ref 0.7–1.3)
DIFFERENTIAL METHOD BLD: ABNORMAL
EOSINOPHIL # BLD: 0.3 K/UL (ref 0–0.4)
EOSINOPHIL NFR BLD: 5 % (ref 0–7)
ERYTHROCYTE [DISTWIDTH] IN BLOOD BY AUTOMATED COUNT: 14.3 % (ref 11.5–14.5)
GLUCOSE SERPL-MCNC: 98 MG/DL (ref 65–100)
HCT VFR BLD AUTO: 27.2 % (ref 36.6–50.3)
HGB BLD-MCNC: 8.2 G/DL (ref 12.1–17)
IMM GRANULOCYTES # BLD: 0.1 K/UL (ref 0–0.04)
IMM GRANULOCYTES NFR BLD AUTO: 1 % (ref 0–0.5)
LYMPHOCYTES # BLD: 1 K/UL (ref 0.8–3.5)
LYMPHOCYTES NFR BLD: 15 % (ref 12–49)
MCH RBC QN AUTO: 27.7 PG (ref 26–34)
MCHC RBC AUTO-ENTMCNC: 30.1 G/DL (ref 30–36.5)
MCV RBC AUTO: 91.9 FL (ref 80–99)
MONOCYTES # BLD: 0.6 K/UL (ref 0–1)
MONOCYTES NFR BLD: 9 % (ref 5–13)
NEUTS SEG # BLD: 4.6 K/UL (ref 1.8–8)
NEUTS SEG NFR BLD: 69 % (ref 32–75)
NRBC # BLD: 0 K/UL (ref 0–0.01)
NRBC BLD-RTO: 0 PER 100 WBC
PLATELET # BLD AUTO: 290 K/UL (ref 150–400)
PMV BLD AUTO: 9.3 FL (ref 8.9–12.9)
POTASSIUM SERPL-SCNC: 4 MMOL/L (ref 3.5–5.1)
RBC # BLD AUTO: 2.96 M/UL (ref 4.1–5.7)
SODIUM SERPL-SCNC: 142 MMOL/L (ref 136–145)
WBC # BLD AUTO: 6.6 K/UL (ref 4.1–11.1)

## 2018-02-05 PROCEDURE — 36415 COLL VENOUS BLD VENIPUNCTURE: CPT | Performed by: PHYSICIAN ASSISTANT

## 2018-02-05 PROCEDURE — 74011250636 HC RX REV CODE- 250/636: Performed by: ORTHOPAEDIC SURGERY

## 2018-02-05 PROCEDURE — 97530 THERAPEUTIC ACTIVITIES: CPT

## 2018-02-05 PROCEDURE — 85025 COMPLETE CBC W/AUTO DIFF WBC: CPT | Performed by: PHYSICIAN ASSISTANT

## 2018-02-05 PROCEDURE — 97535 SELF CARE MNGMENT TRAINING: CPT

## 2018-02-05 PROCEDURE — 74011250637 HC RX REV CODE- 250/637: Performed by: PHYSICIAN ASSISTANT

## 2018-02-05 PROCEDURE — 77010033678 HC OXYGEN DAILY

## 2018-02-05 PROCEDURE — 80048 BASIC METABOLIC PNL TOTAL CA: CPT | Performed by: PHYSICIAN ASSISTANT

## 2018-02-05 PROCEDURE — 74011250637 HC RX REV CODE- 250/637: Performed by: ORTHOPAEDIC SURGERY

## 2018-02-05 RX ORDER — TRAMADOL HYDROCHLORIDE 50 MG/1
50 TABLET ORAL
Qty: 60 TAB | Refills: 0 | Status: SHIPPED | OUTPATIENT
Start: 2018-02-05 | End: 2019-11-07 | Stop reason: ALTCHOICE

## 2018-02-05 RX ORDER — CEFUROXIME AXETIL 500 MG/1
500 TABLET ORAL 2 TIMES DAILY
Qty: 180 TAB | Refills: 0 | Status: SHIPPED | OUTPATIENT
Start: 2018-02-05

## 2018-02-05 RX ADMIN — ENOXAPARIN SODIUM 40 MG: 40 INJECTION SUBCUTANEOUS at 08:57

## 2018-02-05 RX ADMIN — METOCLOPRAMIDE HYDROCHLORIDE 10 MG: 10 TABLET ORAL at 11:38

## 2018-02-05 RX ADMIN — METOCLOPRAMIDE HYDROCHLORIDE 10 MG: 10 TABLET ORAL at 06:35

## 2018-02-05 RX ADMIN — Medication 10 ML: at 06:36

## 2018-02-05 RX ADMIN — BUSPIRONE HYDROCHLORIDE 10 MG: 5 TABLET ORAL at 08:57

## 2018-02-05 RX ADMIN — TRAMADOL HYDROCHLORIDE 50 MG: 50 TABLET, FILM COATED ORAL at 16:19

## 2018-02-05 RX ADMIN — GABAPENTIN 300 MG: 300 CAPSULE ORAL at 08:57

## 2018-02-05 RX ADMIN — FAMOTIDINE 20 MG: 20 TABLET, FILM COATED ORAL at 08:58

## 2018-02-05 RX ADMIN — Medication 10 ML: at 16:20

## 2018-02-05 RX ADMIN — TRAMADOL HYDROCHLORIDE 50 MG: 50 TABLET, FILM COATED ORAL at 08:58

## 2018-02-05 RX ADMIN — FLUOXETINE 20 MG: 20 CAPSULE ORAL at 08:58

## 2018-02-05 RX ADMIN — EZETIMIBE 10 MG: 10 TABLET ORAL at 08:57

## 2018-02-05 RX ADMIN — GABAPENTIN 300 MG: 300 CAPSULE ORAL at 16:19

## 2018-02-05 RX ADMIN — METOCLOPRAMIDE HYDROCHLORIDE 10 MG: 10 TABLET ORAL at 16:19

## 2018-02-05 NOTE — DISCHARGE INSTRUCTIONS
Nutrition Recommendations for Discharge:             Continue Oral Nutrition Supplements at discharge:   Ensure Active High Protein for Muscle Health 1-2 times per day  for 30 days unless otherwise directed by your Primary Care Physician. This product can be purchased at your local grocery store or drug store and online. [address]           REVISION TOTAL HIP DISCHARGE INSTRUCTIONS    Patient: Joseph Boateng MRN: [id number]  SSN: [id number]              Please take the time to review the following instructions before you leave the hospital and use them as guidelines during your recovery from surgery. If you have any questions you may contact my office at [phone]. After hours or during the weekend you may reach me personally at [phone] if there is an emergency. SPECIAL INSTRUCTIONS :   1. Do not bend greater than 90 degrees at the hip for 4 weeks following your discharge  2. Avoid exercises or activities which bring the leg out or away from the mid-line of the body. The surgical repair involves this muscle and it will require 4 weeks to heal.    3. You may walk as tolerated and are encouraged to work daily on progressing your activities with a walker initially. Wound Care/ Dressing Changes:      DRESSING :     Aquacel Dressing : This may be removed by home health 7 days after the date of your surgery. If there is no drainage, then a simple dressing may be used or no dressing at all. Other dressing options can be purchased over the counter at a local pharmacy or medical supply vendor. A porous adhesive dressing such as pictured above can be purchased at a local [employer] or [employer]. You only need to keep the incision covered for 7 days after showers. A dressing may be used for longer if there are issues with clothing clinging to the incision. Showering/ Bathing:     You may shower with the aquacel dressing in place. This is left in place for 7 days following discharge from the hospital. If your incision is dry without drainage you may shower following your discharge home. After 7 days your aquacel dressing should be removed for showering. It is fine to have water run over the incision. Do not vigorously scrub your incision. Apply a clean, dry dressing after you have dried your incision. Do not take a bath or get into a swimming pool / AlfKandu Herrera until you follow up with Dr. Juan C Wolfe. Do not soak your incision under water. If there is continued drainage or you are concerned contact Dr Michelle Mejía office prior to showering [phone] . Diet:  You may advance to your regular diet as tolerated. Increase your clear liquid intake for the next 2-3 days. Medication:      1. You will be given prescriptions for pain medication when you are discharged from the hospital. The side effects of these medications can be substantial and the narcotic medications are not mandatory. You may substitute these medications with Tylenol or Alleve / Motrin. 2.  Please use the medications as prescribed. Pain medications may cause constipation- Colace twice daily and Miralax one scoop daily while taking the narcotic medication should help prevent constipation. Please discuss with your local pharmacist regarding increasing this dosage if constipation persists. Other possible side effects of pain medication are dizziness, headache, nausea, vomiting, and urinary retention. Discontinue the pain medication if you develop itching, rash, shortness of breath, or difficulties swallowing. If these symptoms become severe or are not relieved by discontinuing the medication, you should seek immediate medical attention. 3. Refills of pain medication are authorized during office hours only (8 AM- 5 PM  Monday thru Friday). Many of these medication will require you or a family member to pick-up a physical prescription at the office.    4. Medications other than antiinflammatories will not be called into the pharmacy after business hours. 5. Do not take Tylenol/Acetaminophen in addition to your pain medication as most pain medications already contain this ingredient. Do not exceed 4000mg of Tylenol/Acetaminophen per day. 6. You may resume the medication(s) you were taking prior to your surgery. Narcotics may change the effects of some antidepressant medication(s). If you have any questions about possible interactions between your regular medications and the pain medication, you should ask the pharmacist or contact the prescribing physician. 7. You will be discharged with prescriptions for additional pain medications (Tramadol or Toradol) and a medication for nausea and vomiting (Phenergan). You only need to fill these prescriptions if the primary pain medication is not working or you experiencing post-op nausea. 8. If you have constipation which is not improved by oral stool softeners then a Ducolax suppository should be purchased over the counter. 9. Continue the blood thinner (Aspirin or Lovenox) for a total of 30 days following surgery. Follow up appointment:    Please call our office at [phone] for your follow up appointment. This should be scheduled 10-14 days following the date of surgery. Physical Therapy / Nursing:    Physical Therapy following surgery will be arranged at home along with at home nursing care. They have specific instructions for rehab and wound care. .     Returning to work:    Normal return to work is 3-12 weeks following revision total hip replacement. Depending on your progression following surgery and specific job duties you may take longer for a full return to work. DRIVING    You should not return to driving until you are off all narcotic pain medications and able to safely and quickly apply the brakes. This is normally 3-6 weeks for left hips and 4-8 weeks for right hip.      Important Signs and Symptoms:    If any of the following signs or symptoms occur, you should contact Dr. Shay Enriquez office. Please be advised if a problem arises which you feel requires immediate medical attention or you are unable to contact Dr. Shay Enriquez office you should seek immediate medical attention at the ER or other health care facility you have access to.    1. A sudden increase in swelling and/or redness or warmth at the area your surgery was performed which isnt relieved by rest, ice, and elevation. 2. Oral temperature greater than 101 degrees for 12 hours or more which isnt relieved by an increase in fluid intake and taking 2 Tylenol every 4-6 hours. 3. Excessive drainage from your incisions, or drainage which hasnt stopped by 72 hours after your surgery. 4. Fever, chills, shortness of breath, chest pain, nausea, vomiting or other signs and symptoms which are of concern to you. frequently asked questions   What should I take for pain?  o In general you will be discharged with three medications for pain (Extra Strength Tylenol, Oxycodone 5mg and Tramadol). This may vary slightly depending on what you were taking in the hospital.   - 1st Line - Extra Strength Tylenol 1-2 tablets (500-1000mg) every 4-6 hrs.  After 2 days this dose should not exceed 8 tablets per day   This is the first and only medication you need to take. Initially you may need 2 tablets every 4 hours, but as your pain subsides, this will taper to 1 tablet every 6 hours. - 2nd Line - Tramadol 50mg (1 tablet) every 8 hours  - 3rd Line - Oxycodone 1 (5mg) - 2 (10mg) every 4 hours (Or as directed), take these between Percocet doses if your pain is not below 4 / 10. This may be needed only for several days following your discharge.    - 4th Line - Add Alleve 220mg every 12 hours or Motrin 400mg (200mg x 2) every 8 hours   When should I call for advice regarding my pain?  o After 12 hours on the above regimen, if nothing is working call the office ([phone] or [phone]) or call my cell after hours [phone].  Can I get refills?  o Narcotic refills are provided for the first 6 weeks following surgery. - I will generally try to taper down to a single narcotic medication by your two week appointment. o Try Tylenol 650mg along with Alleve 220mg or Motrin 200mg during the majority of the day. - Save the narcotic pain medications for physical therapy (1 hour prior) and before sleeping at night. - Keep in mind you need to discontinue these medications prior to returning to driving.  Is swelling normal?  o Almost everyone has some degree of swelling following surgery. o Following hip and knee replacement surgery, swelling can be normal below the incision for the first 1-2 weeks. - This swelling peaks around 5-7 days after surgery.   - You may have some bruising around the back of the thigh, calf and even into the foot.  What should I do for the swelling?  o Keep the limb elevated. o Apply compression socks (knee high for total knees and up to the mid-thigh for total hips.   o Heat or ice may be applied, choose the modality that makes you the most comfortable.  How long should I remain on blood thinners following surgery?  o Thirty days   When can I drive?  o Once you have stopped using regular narcotic pain medications (Percocet, Lortab, etc.) and can safely apply the brakes without hesitation.  When can I shower? o 72hours following surgery if the incision is dry.  o No submersion of the incision, bathing or swimming for 14 days following surgery or until cleared by Dr Jesus Bliss.  What do I do with the dressing when I shower?  o The dressing can be removed. o The incision is sealed with Dermabond (Biologic glue) and except for wounds which are draining should be watertight.  How active should I be following surgery?   o Progress activities in moderation at your own pace.   o Walk each day and set progressive goals with small increments (1st week - ½ block of walking, 2nd week - 1 block, 3rd week - 2 blocks, etc.)    Please do not hesitate to call me at [phone] (cell phone) for questions following surgery - MD Angela Mendoza MD  Cell [phone]  Romain Hoffmann PA-C  Cell [phone]  Medical Staff : German Hyman  Office : [phone]

## 2018-02-05 NOTE — PROGRESS NOTES
2/5/2018 11:08 AM CM called and notified pt's daughter, Jostin Mata of pt's discharge today. CM called and lvm with pt's daughter, Vicente Zee to notify of discharge. 2/5/2018 11:05 AM Discharge noted. Faxed pt's uai, avs, and discharge summary to [address] [place] [address] at [phone]. Spoke with Jason Heredia in admissions who reported they can accept pt after 3:30PM.  Ambulance transport arranged for 3:30PM to [address] [place] [address]. Nursing please call report to [phone]. Ambulance packet on pt's hard chart. 2/5/2018  9:35 AM Planning for discharge [place] [id number] [facility] today. Awaiting discharge to be completed. Confirmed with Jason Heredia in admissions at [address] [place] [address] they can accept pt today. CM arranged transport for 3PM via ambulance transport. Awaiting discharge.  Margret Colbert, BSW

## 2018-02-05 NOTE — PROGRESS NOTES
Nurse read all discharge instructions to patient and daughter. Discharge packet prepared for ambulance drivers.  Verbalized understanding

## 2018-02-05 NOTE — ROUTINE PROCESS
Bedside and Verbal shift change report given to  44[address] Anjum Gleason (oncoming nurse) by Delfin Espinoza  (offgoing nurse). Report included the following information SBAR, Kardex and Intake/Output.

## 2018-02-05 NOTE — DISCHARGE SUMMARY
Total Hip Revision Discharge Summary    Patient ID:  Marni Hodgkins  1935  81 y.o.  [id number]    Admit date: 1/30/2018    Discharge date and time: 2/5/18    Admitting Physician: Mildred Borja MD     Admission Diagnoses: Right periprosthetic femur fracture status post total hip arthroplasty    Discharge Diagnoses: Right periprosthetic femur fracture status post total hip arthroplasty. Surgeon: Abdoul Chan MD    HOSPITAL COURSE:  Marni Hodgkins was admitted on1/30/2018 and underwent successful right hip stem exchange for a right femur periprosthetic femur fracture. Intra-operative complications or issues were none. Intraoperative cultures were obtained and thio broth only grew Enterococcus faecalis. He was on standard IV Ancef and Vancomycin preoperatively and IV Ancef postoperatively. The patient was transferred to the orthopaedic floor in stable condition. Physical therapy issues during the hospital stay included none. At the time of discharge, the patient was able to ambulate safely and had an understanding of the explicit discharge precautions and instructions following surgery. The patient had adequate oral pain control and good PO intake. Important in Hospital Events : None    Post Op complications: None    Current Discharge Medication List      START taking these medications    Details   cefUROXime (CEFTIN) 500 mg tablet Take 1 Tab by mouth two (2) times a day. Indications: Skin and Skin Structure Infection  Qty: 180 Tab, Refills: 0    Associated Diagnoses: Periprosthetic fracture around internal prosthetic right hip joint, initial encounter ([facility] [address]      ! ! traMADol (ULTRAM) 50 mg tablet Take 1 Tab by mouth every six (6) hours as needed for Pain. Max Daily Amount: 200 mg. Qty: 60 Tab, Refills: 0    Associated Diagnoses: Periprosthetic fracture around internal prosthetic right hip joint, initial encounter ([address]       ! ! - Potential duplicate medications found. Please discuss with provider. CONTINUE these medications which have NOT CHANGED    Details   ergocalciferol (ERGOCALCIFEROL) 50,000 unit capsule Take 50,000 Units by mouth Every Saturday. aspirin delayed-release 325 mg tablet Take 1 Tab by mouth two (2) times a day. Qty: 60 Tab, Refills: 0    Associated Diagnoses: Primary osteoarthritis of right hip      !! traMADol (ULTRAM) 50 mg tablet Take 1 Tab by mouth every six (6) hours as needed for Pain (Take for breakthrough pain if Oxycodone is not working). Max Daily Amount: 200 mg. Indications: Pain, Post-op Pain, Diagnosis Hip and Knee Arthritis ICD 10 - M16.9  Qty: 60 Tab, Refills: 0    Associated Diagnoses: Primary osteoarthritis of right hip      ondansetron (ZOFRAN ODT) 8 mg disintegrating tablet Take 0.5 Tabs by mouth every eight (8) hours as needed for Nausea. Qty: 30 Tab, Refills: 0    Associated Diagnoses: Primary osteoarthritis of right hip      busPIRone (BUSPAR) 10 mg tablet Take 10 mg by mouth two (2) times a day. RABEprazole (ACIPHEX) 20 mg tablet Take 20 mg by mouth daily. montelukast (SINGULAIR) 10 mg tablet Take 10 mg by mouth every evening.      gabapentin (NEURONTIN) 300 mg capsule Take 300 mg by mouth three (3) times daily. ezetimibe (ZETIA) 10 mg tablet Take 10 mg by mouth daily. fluoxetine (PROZAC) 20 mg capsule Take 1 capsule by mouth daily. Qty: 30 capsule, Refills: 3    Associated Diagnoses: Anxiety      simvastatin (ZOCOR) 40 mg tablet Take 1 tablet by mouth nightly. Qty: 90 tablet, Refills: 1    Associated Diagnoses: Hypercholesteremia      lorazepam (ATIVAN) 0.5 mg tablet Take 1 tablet by mouth two (2) times daily as needed for Anxiety. Qty: 60 tablet, Refills: 2    Associated Diagnoses: Anxiety      metoclopramide HCl (REGLAN) 10 mg tablet Take 1 Tab by mouth Before breakfast, lunch, dinner and at bedtime. Qty: 360 Tab, Refills: 3    Associated Diagnoses: GERD (gastroesophageal reflux disease)       ! ! - Potential duplicate medications found. Please discuss with provider. STOP taking these medications       oxyCODONE IR (ROXICODONE) 5 mg immediate release tablet Comments:   Reason for Stopping:               CULTURES :  Lab Results   Component Value Date/Time    Specimen Description: URINE 02/05/2014 01:41 PM    Specimen Description: NARES 02/05/2014 01:00 PM     Lab Results   Component Value Date/Time    Culture result: Culture performed on Fluid swab specimen 01/31/2018 04:48 PM    Culture result: NO GROWTH ON SOLID MEDIA THUS FAR, HOLDING 14 DAYS 01/31/2018 04:48 PM    Culture result:  01/31/2018 04:48 PM     ENTEROCOCCUS FAECALIS GROUP D ISOLATED FROM THIO BROTH ONLY    Culture result: No growth thus far, holding 14 days. 01/31/2018 04:48 PM    Culture result: MRSA PRESENT 01/04/2018 11:04 AM    Culture result:  01/04/2018 11:04 AM     CALLED TO AND READ BACK BY   NABEEL BARRON ON 1/7/17 AT 0930      Culture result:  01/04/2018 11:04 AM         Screening of patient nares for MRSA is for surveillance purposes and, if positive, to facilitate isolation considerations in high risk settings. It is not intended for automatic decolonization interventions per se as regimens are not sufficiently effective to warrant routine use.          Discharged to: [facility]      Signed:  NABEEL Arriaga  2/5/2018  10:39 AM

## 2018-02-05 NOTE — PROGRESS NOTES
Problem: Mobility Impaired (Adult and Pediatric)  Goal: *Acute Goals and Plan of Care (Insert Text)  Physical Therapy Goals  Initiated 2/1/2018    1. Patient will move from supine to sit and sit to supine  in bed with supervision/set-up within 4 days. 2. Patient will perform sit to stand with minimal contact guard assist within 4 days. 3. Patient will ambulate with minimal assistance/contact guard assist for 150 feet with the least restrictive device within 4 days. 4. Patient will ascend/descend 3 stairs with 1 handrail(s) with minimal assistance/contact guard assist within 4 days. 5. Patient will verbalize and demonstrate understanding of 3/3 precautions per protocol within 4 days. 6. Patient will perform home exercise program per protocol with independence within 4 days. physical Therapy TREATMENT  Patient: Kirstie Lindo (86 y.o. male)  Date: 2/5/2018  Diagnosis: Periprosthetic fracture of femur following total replacement of hip, initial encounter  Left Allison Prosthetic fracture  Periprosthetic fracture around internal prosthetic joint  Periprosthetic fracture around internal prosthetic right hip joint, sequela <principal problem not specified>  Procedure(s) (LRB):  FEMUR OPEN REDUCTION INTERNAL FIXATION WITH STEM EXCHANGE RIGHT (Right) 5 Days Post-Op  Precautions: WBAT, Fall, Total hip (anterior approach)  Chart, physical therapy assessment, plan of care and goals were reviewed. ASSESSMENT:  Pt received in bed, sleeping soundly. Agreeable to participate with physical therapy once wakened. Mod/ Max A for bed mobility>sitting EOB. Demonstrated posterior lean upon sitting EOB, verbal and tactile cues improved to unsupported sitting. Mod/ Max A x2 for sit<>stand using RW. Demonstrated posterior lean upon standing, fatigues quickly and requested to return to sitting. Second attempt with standing pt able to perform stand pivot to chair with max verbal cues and tactile cues for wt shifting and managing RW. Pt left up in chair, alarm in place, with JURADO. Maintained O2 sat 90% or greater  On RW  with activity. Progression toward goals:  []      Improving appropriately and progressing toward goals  [x]      Improving slowly and progressing toward goals  []      Not making progress toward goals and plan of care will be adjusted     PLAN:  Patient continues to benefit from skilled intervention to address the above impairments. Continue treatment per established plan of care. Discharge Recommendations:  Rehab  Further Equipment Recommendations for Discharge:  TBD at rehab     SUBJECTIVE:   Patient stated I need some help.   The patient stated 3/3 hip precautions. Reviewed all 3 with patient. OBJECTIVE DATA SUMMARY:   Critical Behavior:  Neurologic State: Alert  Orientation Level: Oriented X4  Cognition: Follows commands  Safety/Judgement: Fall prevention  Functional Mobility Training:  Bed Mobility:     Supine to Sit: Assist x2;Maximum assistance     Scooting: Moderate assistance        Transfers:  Sit to Stand: Moderate assistance;Assist x2                                Balance:  Sitting: Intact  Standing: Impaired; With support (posterior lean)  Standing - Static: Fair;Constant support  Standing - Dynamic : Poor  Ambulation/Gait Training:  Distance (ft): 3 Feet (ft)  Assistive Device: Walker, rolling;Gait belt  Ambulation - Level of Assistance: Maximum assistance        Gait Abnormalities: Step to gait        Base of Support: Widened     Speed/[place]: Slow                       Stairs:              Therapeutic Exercises:   SUPINE  EXERCISES   Sets   Reps   Active Active Assist   Passive Self ROM   Comments   Ankle Pumps   []                                           []                                           []                                           []                                              Quad Sets   [x]                                           []                                           [] []                                              Heel Slides   []                                           [x]                                           []                                           []                                              Hip Abduction   []                                           []                                           []                                           []                                              Hip External Rotation   []                                           []                                           []                                           []                                              Glut Sets   []                                           []                                           []                                           []                                                 []                                           []                                           []                                           []                                                 []                                           []                                           []                                           []                                                STANDING  EXERCISES   Sets   Reps   Active Active Assist   Passive Self ROM   Comments   Heel Raises   []                                           []                                           []                                           []                                              Hip Abduction   []                                           []                                           []                                           []                                              Hip External Rotation   []                                           []                                           []                                           [] Hip Flexor Stretch   []                                           []                                           []                                           []                                              Mini squats   []                                           []                                           []                                           []                                              Hamstring Curl   []                                           []                                           []                                           []                                                Pain:  Pain Scale 1: Numeric (0 - 10)  Pain Intensity 1: 0              Activity Tolerance:   Good  Please refer to the flowsheet for vital signs taken during this treatment.   After treatment:   [x]  Patient left in no apparent distress sitting up in chair  []  Patient left in no apparent distress in bed  [x]  Call bell left within reach  [x]  Nursing notified  []  Caregiver present  [x]  Chair alarm activated    COMMUNICATION/COLLABORATION:   The patients plan of care was discussed with: Certified Occupational Therapy Assistant and Registered Nurse    Laury Thompson   Time Calculation: 24 mins

## 2018-02-05 NOTE — PROGRESS NOTES
Pharmacist Discharge Medication Reconciliation    Discharge Provider:  NABEEL Kimball       Discharge Medications:      My Medications        STOP taking these medications              oxyCODONE IR 5 mg immediate release tablet   Commonly known as:  Michael Lady these medications as instructed         Instructions Each Dose to Equal   Morning Noon Evening Bedtime      aspirin delayed-release 325 mg tablet        Take 1 Tab by mouth two (2) times a day. 325 mg                        busPIRone 10 mg tablet   Commonly known as:  BUSPAR   Your last dose was:  2/5/18  09:00        Take 10 mg by mouth two (2) times a day. 10 mg                        cefUROXime 500 mg tablet   Commonly known as:  CEFTIN        Take 1 Tab by mouth two (2) times a day. Indications: Skin and Skin Structure Infection    500 mg                        ergocalciferol 50,000 unit capsule   Commonly known as:  ERGOCALCIFEROL        Take 50,000 Units by mouth Every Saturday. 78572 Units                        FLUoxetine 20 mg capsule   Commonly known as:  PROzac   Your last dose was:  2/5/18  09:00        Take 1 capsule by mouth daily. 20 mg                        gabapentin 300 mg capsule   Commonly known as:  NEURONTIN   Your last dose was:  2/5/18  09;00        Take 300 mg by mouth three (3) times daily. 300 mg                    due       LORazepam 0.5 mg tablet   Commonly known as:  ATIVAN        Take 1 tablet by mouth two (2) times daily as needed for Anxiety. 0.5 mg                        metoclopramide HCl 10 mg tablet   Commonly known as:  REGLAN   Your last dose was:  2/5/18  11:40   Your next dose is:  Dinner the Bedtime        Take 1 Tab by mouth Before breakfast, lunch, dinner and at bedtime. 10 mg                              montelukast 10 mg tablet   Commonly known as:  SINGULAIR   Your last dose was:  2/4/18        Take 10 mg by mouth every evening.     10 mg ondansetron 8 mg disintegrating tablet   Commonly known as:  ZOFRAN ODT        Take 0.5 Tabs by mouth every eight (8) hours as needed for Nausea. 4 mg                        RABEprazole 20 mg tablet   Commonly known as:  ACIPHEX        Take 20 mg by mouth daily. 20 mg                        simvastatin 40 mg tablet   Commonly known as:  ZOCOR   Your last dose was:  2/4/18  PM        Take 1 tablet by mouth nightly. 40 mg                           * traMADol 50 mg tablet   Commonly known as:  ULTRAM   Your last dose was:  2/5/18  09:00        Take 1 Tab by mouth every six (6) hours as needed for Pain (Take for breakthrough pain if Oxycodone is not working). Max Daily Amount: 200 mg. Indications: Pain, Post-op Pain, Diagnosis Hip and Knee Arthritis ICD 10 - M16.9    50 mg                        * traMADol 50 mg tablet   Commonly known as:  ULTRAM        Take 1 Tab by mouth every six (6) hours as needed for Pain. Max Daily Amount: 200 mg.    50 mg                        ZETIA 10 mg tablet   Generic drug:  ezetimibe   Your last dose was:  2/5/18  09:00        Take 10 mg by mouth daily. 10 mg                        * Notice: This list has 2 medication(s) that are the same as other medications prescribed for you. Read the directions carefully, and ask your doctor or other care provider to review them with you. Where to Get Your Medications        Information on where to get these meds will be given to you by the nurse or doctor. !  Ask your nurse or doctor about these medications     cefUROXime 500 mg tablet    traMADol 50 mg tablet              The patient's AVS was reviewed by Katy Brooks, Pharmacist.

## 2018-02-05 NOTE — PROGRESS NOTES
Problem: Self Care Deficits Care Plan (Adult)  Goal: *Acute Goals and Plan of Care (Insert Text)  Occupational Therapy Goals  Initiated 2/1/2018  1. Patient will perform self-feeding with modified independence within 7 day(s). 2.  Patient will perform static sitting edge of bed > or = 10 minutes with supervision and minimal complaint of pain within 7 day(s). 3.  Patient will perform sit to stand with minimal assist x's 2 and maintain > or = 1 minute within 7 day(s). 4.  Patient will perform bilateral UE strengthening exercises with min cues for > or = 10 minutes within 7 day(s). 5.  Patient will perform UE dressing seated edge of bed with CGA within 7 day(s). Occupational Therapy TREATMENT  Patient: Sumi Mcdaniels (25 y.o. male)  Date: 2/5/2018  Diagnosis: Periprosthetic fracture of femur following total replacement of hip, initial encounter  Left Allison Prosthetic fracture  Periprosthetic fracture around internal prosthetic joint  Periprosthetic fracture around internal prosthetic right hip joint, sequela <principal problem not specified>  Procedure(s) (LRB):  FEMUR OPEN REDUCTION INTERNAL FIXATION WITH STEM EXCHANGE RIGHT (Right) 5 Days Post-Op  Precautions: WBAT, Fall, Total hip (anterior approach)  Chart, occupational therapy assessment, plan of care, and goals were reviewed. ASSESSMENT:  Pt sleeping but aroused to voice. After sitting edge of bed he stood with moderate assist x 2, verbal cueing for posture with posterior lean noted. After pt seated in chair he washed his face and arms with set-up as well as used mouth wash for oral care. His O2 sats 94% on 1 L. No pain verbalized. Pt scheduled to be discharged to a SNF for rehab later today.    Progression toward goals:  []          Improving appropriately and progressing toward goals  [x]          Improving slowly and progressing toward goals  []          Not making progress toward goals and plan of care will be adjusted     PLAN:  Patient continues to benefit from skilled intervention to address the above impairments. Continue treatment per established plan of care. Discharge Recommendations:  SNF for rehab  Further Equipment Recommendations for Discharge: None     SUBJECTIVE:   Patient stated That's my daughter.     OBJECTIVE DATA SUMMARY:   Cognitive/Behavioral Status:  Neurologic State: Alert  Orientation Level: Oriented X4  Cognition: Follows commands           Functional Mobility and Transfers for ADLs:   Bed Mobility:      Max assist x 2 needing verbal cueing. Transfers:  Sit to Stand: Moderate assistance;Assist x2       Balance:  Sitting: Intact  Standing: Impaired (posterior lean)  ADL Intervention:       Grooming  Washing Face: Supervision/set-up  Brushing Teeth: Supervision/set-up (using mouthwash)    Upper Body Bathing  Bathing Assistance: Supervision/set-up  Position Performed: Seated in chair            Pain:  Pain Scale 1: Numeric (0 - 10)  Pain Intensity 1: 0                Activity Tolerance:    Fair  Please refer to the flowsheet for vital signs taken during this treatment.   After treatment:   [x]  Patient left in no apparent distress sitting up in chair  []  Patient left in no apparent distress in bed  [x]  Call bell left within reach  [x]  Nursing notified  []  Caregiver present  [x]  Chair alarm activated    COMMUNICATION/COLLABORATION:   The patients plan of care was discussed with: Physical Therapy Assistant and Occupational Therapist    DANYEL Cedillo  Time Calculation: 25 mins

## 2018-02-11 NOTE — ANESTHESIA POSTPROCEDURE EVALUATION
Post-Anesthesia Evaluation and Assessment    Patient: Roma Castanon MRN: [id number]  SSN: [id number]    YOB: 1935  Age: 81 y.o. Sex: male       Cardiovascular Function/Vital Signs  Visit Vitals    /54 (BP 1 Location: Left arm, BP Patient Position: Head of bed elevated (Comment degrees))    Pulse 84    Temp 36.8 °C (98.3 °F)    Resp 18    Ht 5' 7\" (1.702 m)    Wt 94.3 kg (207 lb 14.3 oz)    SpO2 94%    BMI 32.56 kg/m2       Patient is status post general anesthesia for Procedure(s): FEMUR OPEN REDUCTION INTERNAL FIXATION WITH STEM EXCHANGE RIGHT. Nausea/Vomiting: None    Postoperative hydration reviewed and adequate. Pain:  Pain Scale 1: Numeric (0 - 10) (02/05/18 0318)  Pain Intensity 1: 0 (02/05/18 0318)   Managed    Neurological Status:   Neuro (WDL): Exceptions to WDL (01/31/18 1957)  Neuro  Neurologic State: Alert (02/05/18 1100)  Orientation Level: Oriented X4 (02/05/18 1100)  Cognition: Follows commands (02/05/18 1100)  Speech: Clear (02/05/18 0825)  LUE Motor Response: Purposeful (02/05/18 0825)  LLE Motor Response: Purposeful (02/05/18 0825)  RUE Motor Response: Purposeful (02/05/18 0825)  RLE Motor Response: Weak (02/05/18 0825)   At baseline    Mental Status and Level of Consciousness: Arousable    Pulmonary Status:   O2 Device: Room air (02/05/18 1511)   Adequate oxygenation and airway patent    Complications related to anesthesia: None    Post-anesthesia assessment completed.  No concerns    Signed By: Maria Guerra MD     February 11, 2018

## 2018-02-14 LAB
BACTERIA SPEC CULT: ABNORMAL
BACTERIA SPEC CULT: NORMAL
BACTERIA SPEC CULT: NORMAL
GRAM STN SPEC: ABNORMAL
GRAM STN SPEC: ABNORMAL
SERVICE CMNT-IMP: ABNORMAL
SERVICE CMNT-IMP: NORMAL

## 2018-02-28 ENCOUNTER — HOSPITAL ENCOUNTER (OUTPATIENT)
Dept: NUCLEAR MEDICINE | Age: 83
Discharge: HOME | End: 2018-02-28
Attending: UROLOGY
Payer: MEDICARE

## 2018-02-28 ENCOUNTER — HOSPITAL ENCOUNTER (OUTPATIENT)
Dept: CT IMAGING | Age: 83
Discharge: HOME | End: 2018-02-28
Attending: UROLOGY
Payer: MEDICARE

## 2018-02-28 DIAGNOSIS — C61 PROSTATE CANCER (HCC): ICD-10-CM

## 2018-02-28 PROCEDURE — 74011636320 HC RX REV CODE- 636/320: Performed by: RADIOLOGY

## 2018-02-28 PROCEDURE — 78306 BONE IMAGING WHOLE BODY: CPT

## 2018-02-28 PROCEDURE — 74177 CT ABD & PELVIS W/CONTRAST: CPT

## 2018-02-28 RX ADMIN — IOPAMIDOL 91 ML: 755 INJECTION, SOLUTION INTRAVENOUS at 13:11

## 2019-10-17 ENCOUNTER — HOSPITAL ENCOUNTER (OUTPATIENT)
Dept: NUCLEAR MEDICINE | Age: 84
Discharge: HOME | End: 2019-10-17
Attending: UROLOGY
Payer: MEDICARE

## 2019-10-17 ENCOUNTER — HOSPITAL ENCOUNTER (OUTPATIENT)
Dept: CT IMAGING | Age: 84
Discharge: HOME | End: 2019-10-17
Attending: UROLOGY
Payer: MEDICARE

## 2019-10-17 DIAGNOSIS — C61 PROSTATE CANCER (HCC): ICD-10-CM

## 2019-10-17 PROCEDURE — 74177 CT ABD & PELVIS W/CONTRAST: CPT

## 2019-10-17 PROCEDURE — 78306 BONE IMAGING WHOLE BODY: CPT

## 2019-10-17 PROCEDURE — 74011636320 HC RX REV CODE- 636/320

## 2019-10-17 RX ADMIN — IOPAMIDOL 100 ML: 755 INJECTION, SOLUTION INTRAVENOUS at 13:12

## 2019-11-07 ENCOUNTER — DOCUMENTATION ONLY (OUTPATIENT)
Dept: ONCOLOGY | Age: 84
End: 2019-11-07

## 2019-11-07 ENCOUNTER — OFFICE VISIT (OUTPATIENT)
Dept: ONCOLOGY | Age: 84
End: 2019-11-07

## 2019-11-07 VITALS
DIASTOLIC BLOOD PRESSURE: 74 MMHG | HEART RATE: 84 BPM | SYSTOLIC BLOOD PRESSURE: 157 MMHG | OXYGEN SATURATION: 91 % | RESPIRATION RATE: 16 BRPM | TEMPERATURE: 97.9 F

## 2019-11-07 DIAGNOSIS — C61 PROSTATE CANCER (HCC): Primary | ICD-10-CM

## 2019-11-07 PROBLEM — C77.2 METASTASIS TO RETROPERITONEAL LYMPH NODE (HCC): Status: ACTIVE | Noted: 2019-11-07

## 2019-11-07 RX ORDER — SENNOSIDES 8.6 MG/1
1 TABLET ORAL DAILY
COMMUNITY

## 2019-11-07 RX ORDER — OMEPRAZOLE 20 MG/1
20 CAPSULE, DELAYED RELEASE ORAL DAILY
COMMUNITY

## 2019-11-07 RX ORDER — ACETAMINOPHEN 325 MG/1
TABLET ORAL
COMMUNITY

## 2019-11-07 RX ORDER — ADHESIVE BANDAGE
30 BANDAGE TOPICAL DAILY PRN
COMMUNITY

## 2019-11-07 RX ORDER — POLYETHYLENE GLYCOL 3350 17 G/17G
17 POWDER, FOR SOLUTION ORAL DAILY
COMMUNITY

## 2019-11-07 RX ORDER — GUAIFENESIN 100 MG/5ML
200 SOLUTION ORAL
COMMUNITY

## 2019-11-07 NOTE — PROGRESS NOTES
[facility] at [address] [facility]  Jennifer Ashok: [phone]  F: [phone]      Reason for Visit:   Axel De Paz is a 81 y.o. male who is seen in consultation at the request of Dr. Marylou Bahena for evaluation of prostate cancer. Treatment History:   · Pre-treatment PSA 0.9  · Prostatectomy 2002: [place] 3+3=6 adenocarcinoma, pT3a pN0 M0  · Radiation 2006  · ADT with Lupron beginning 2011  · CT A/P 10/17/2019: Slight interval enlargement of suspected metastatic left pelvic adenopathy. No newly identified metastatic disease status post prostatectomy. · Bone scan 10/17/2019: Degenerative and postoperative changes. No evidence of metastatic disease. History of Present Illness:   Axel De Paz is a pleasant 81 y.o. male who presents today for evaluation of prostate cancer. His history is outlined above. He had a prostatectomy in , and then salvage radiation in . He developed biochemical recurrence and was started on ADT in . He has been tolerating this well, some hot flashes, manageable. His PSA has been climbing over the past 2 years, most recently up to 11.84, and recent imaging was concerning for pelvic adenopathy. Dr. Marylou Bahena discussed various treatment options with him, and he has referred him to see me for further discussion and recommendations. Currently he is feeling fairly well. He is overall weak and rather frail, lives in a nursing home, uses a wheelchair and walker due to some balance issues and weakness. He denies pain, other than his chronic arthritic pain. He had an episode of hematuria last month, which has resolved and not recurred. Polyuria and nocturia are chronic. He is accompanied by his daughter today. He lives in the [place].     Past Medical History:   Diagnosis Date    Anxiety state, unspecified     Arthritis     Cancer ([address]     prostate,     Depression     GERD (gastroesophageal reflux disease)     Hiatal hernia     Hypercholesteremia     Renal calculus     Seasonal allergic rhinitis       Past Surgical History:   Procedure Laterality Date    ENDOSCOPY, COLON, DIAGNOSTIC      HX APPENDECTOMY      HX CATARACT REMOVAL  2012    HX COLONOSCOPY      HX ENDOSCOPY      HX GI      colonoscopy    HX KNEE REPLACEMENT  02/18/14    right total knee    HX KNEE REPLACEMENT Left 10/2015    HX LITHOTRIPSY  11/2011    HX ORTHOPAEDIC  1992    rt. carpal tunnel    HX PROSTATECTOMY  2003    bowel incontinence since surgery    HX TONSILLECTOMY        Social History     Tobacco Use    Smoking status: Never Smoker    Smokeless tobacco: Never Used   Substance Use Topics    Alcohol use: No      Family History   Problem Relation Age of Onset    Heart Disease Mother     Stroke Mother     Cancer Father         COLON    Arthritis-rheumatoid Neg Hx     Malignant Hyperthermia Neg Hx     Pseudocholinesterase Deficiency Neg Hx     Delayed Awakening Neg Hx     Post-op Nausea/Vomiting Neg Hx     Post-op Cognitive Dysfunction Neg Hx     Emergence Delirium Neg Hx      Current Outpatient Medications   Medication Sig    acetaminophen (TYLENOL) 325 mg tablet Take  by mouth every four (4) hours as needed for Pain.  polyethylene glycol (MIRALAX) 17 gram/dose powder Take 17 g by mouth daily.  guaiFENesin (ROBITUSSIN) 100 mg/5 mL liquid Take 200 mg by mouth three (3) times daily as needed for Cough.  senna (SENNA) 8.6 mg tablet Take 1 Tab by mouth daily.  magnesium hydroxide (ROSALES MILK OF MAGNESIA) 400 mg/5 mL suspension Take 30 mL by mouth daily as needed for Constipation.  omeprazole (PRILOSEC) 20 mg capsule Take 20 mg by mouth daily.  ergocalciferol (ERGOCALCIFEROL) 50,000 unit capsule Take 50,000 Units by mouth Every Saturday.  aspirin delayed-release 325 mg tablet Take 1 Tab by mouth two (2) times a day.     busPIRone (BUSPAR) 10 mg tablet Take 10 mg by mouth two (2) times a day.    montelukast (SINGULAIR) 10 mg tablet Take 10 mg by mouth every evening.  gabapentin (NEURONTIN) 300 mg capsule Take 300 mg by mouth three (3) times daily.  ezetimibe (ZETIA) 10 mg tablet Take 10 mg by mouth daily.  fluoxetine (PROZAC) 20 mg capsule Take 1 capsule by mouth daily.  metoclopramide HCl (REGLAN) 10 mg tablet Take 1 Tab by mouth Before breakfast, lunch, dinner and at bedtime.  cefUROXime (CEFTIN) 500 mg tablet Take 1 Tab by mouth two (2) times a day. Indications: Skin and Skin Structure Infection    ondansetron (ZOFRAN ODT) 8 mg disintegrating tablet Take 0.5 Tabs by mouth every eight (8) hours as needed for Nausea.  RABEprazole (ACIPHEX) 20 mg tablet Take 20 mg by mouth daily.  simvastatin (ZOCOR) 40 mg tablet Take 1 tablet by mouth nightly.  lorazepam (ATIVAN) 0.5 mg tablet Take 1 tablet by mouth two (2) times daily as needed for Anxiety. No current facility-administered medications for this visit. Allergies   Allergen Reactions    Codeine Nausea and Vomiting    Flu Vac 2015 (65 Up)-Mf59c(Pf) Nausea Only    Fluvirin [id number] Other (comments)     GI upset    Naprosyn [Naproxen] Nausea and Vomiting     Gas          Review of Systems: A complete review of systems was obtained, negative except as described above. Physical Exam:     Visit Vitals  /74 (BP 1 Location: Right arm, BP Patient Position: Sitting)   Pulse 84   Temp 97.9 °F (36.6 °C) (Temporal)   Resp 16   SpO2 91%     ECOG PS: 3  General: No distress. Hearing loss. Eyes: PERRLA, anicteric sclerae  HENT: Atraumatic, OP clear  Neck: Supple  Lymphatic: No cervical, supraclavicular, or inguinal adenopathy  Respiratory: CTAB, normal respiratory effort  CV: Normal rate, regular rhythm, no murmurs, no peripheral edema  GI: Soft, nontender, nondistended, no masses, no hepatomegaly, no splenomegaly  MS: Normal gait and station. Digits without clubbing or cyanosis.   Skin: No rashes, ecchymoses, or petechiae. Normal temperature, turgor, and texture. Psych: Alert, oriented, appropriate affect, normal judgment/insight    Results:     Lab Results   Component Value Date/Time    WBC 6.6 02/05/2018 05:47 AM    HGB 8.2 (L) 02/05/2018 05:47 AM    HCT 27.2 (L) 02/05/2018 05:47 AM    PLATELET 124 55/66/6104 05:47 AM    MCV 91.9 02/05/2018 05:47 AM    ABS. NEUTROPHILS 4.6 02/05/2018 05:47 AM    Hemoglobin (POC) 13.6 09/11/2014 06:48 PM    Hematocrit (POC) 40 09/11/2014 06:48 PM     Lab Results   Component Value Date/Time    Sodium 142 02/05/2018 05:47 AM    Potassium 4.0 02/05/2018 05:47 AM    Chloride 103 02/05/2018 05:47 AM    CO2 33 (H) 02/05/2018 05:47 AM    Glucose 98 02/05/2018 05:47 AM    BUN 10 02/05/2018 05:47 AM    Creatinine 0.89 02/05/2018 05:47 AM    GFR est AA >60 02/05/2018 05:47 AM    GFR est non-AA >60 02/05/2018 05:47 AM    Calcium 8.9 02/05/2018 05:47 AM    Sodium (POC) 143 09/11/2014 06:48 PM    Potassium (POC) 3.5 09/11/2014 06:48 PM    Chloride (POC) 105 09/11/2014 06:48 PM    Glucose (POC) 103 09/11/2014 06:48 PM    BUN (POC) 13 09/11/2014 06:48 PM    Creatinine (POC) 1.5 (H) 09/11/2014 06:48 PM    Calcium, ionized (POC) 1.18 09/11/2014 06:48 PM     Lab Results   Component Value Date/Time    Bilirubin, total 0.4 01/04/2018 11:28 AM    ALT (SGPT) 21 01/04/2018 11:28 AM    AST (SGOT) 18 01/04/2018 11:28 AM    Alk. phosphatase 117 01/04/2018 11:28 AM    Protein, total 7.0 01/04/2018 11:28 AM    Albumin 3.3 (L) 01/04/2018 11:28 AM    Globulin 3.7 01/04/2018 11:28 AM         Date  PSA  10/11/2019 11.84  03/26/2019 5.05  09/25/2018 4.53  03/22/2018 4.67  01/26/2018 5.46  09/01/2017 0.618  04/21/2017 0.19  12/08/2016 <0.1  07/21/2016 0.129  02/23/2016 0.199  01/29/2016 1.97  07/10/2015 0.294  04/14/2015 0.505  12/04/2014 3.58  06/12/2014 0.937    10/11/2019  Tesosterone: 2.5    Records reviewed and summarized above. Pathology report(s) reviewed above. Radiology report(s) reviewed above.     Assessment: 1) Castrate resistant prostate cancer  He is s/p prostatectomy in 2002, followed by salvage radiation in 2006, and subsequently initiated ADT in 2011. He now has castrate resistant disease based on persistently rising PSA with adequately suppressed testosterone. He has some pelvic adenopathy, but no other evidence of metastatic disease on imaging. We had a lengthy discussion regarding his diagnosis, prognosis, and management options. His cancer is not curable and management is with palliative intent. Treatment options are somewhat limited by his poor performance status. He is not a good candidate for palliative chemotherapy. No osseous disease to warrant Xofigo. Provenge is a potential option, but I think logistically it would be difficult on this rather frail 83yo. Dr. Nayan Bills has raised valid concerns regarding enzalutamide given the patients problems with balance. If we were to discount the pelvic adenopathy and call this nonmetastatic, we could consider apalutamide or darolutamide which have less CNS issues than enzalutamide. But I think the most appropriate option at this time is treatment with abiraterone and prednisone. The risks and benefits of therapy were discussed today and he is agreeable to therapy. He will follow up with Dr. Nayan Bills next month to initiate therapy. I will see him again in 6 months to follow along and assist as needed. He should continue with Lupron indefinitely, which he is receiving with [facility]. We did perform [facility] Prostate Detect testing today to assess for germline mutation such as BRCA that may allow for olaparib in the future.       Plan:     · [facility] genetic testing today  · Follow up with Dr. Nayan Bills next month to initiate Abiraterone and Prednisone  · Continue Lupron long-term, receiving with [facility]  ·  to meet with patient today, provide resources and counseling  · Return to see me in 6 months, or sooner as needed    I appreciate the opportunity to participate in  Augusto Shannan [facility].     Signed By: Wolf Arroyo MD

## 2019-11-07 NOTE — PROGRESS NOTES
[facility]  [phone]    11/07/19- Saliva sample and test requisition form for [facility] Detect mailed via [employer], confirmation #[id number].

## 2019-11-08 NOTE — PROGRESS NOTES
Oncology Navigator  Psychosocial Assessment    Reason for Assessment:    []Depression  []Anxiety  []Caregiver [place]  []Maladaptive Coping with Serious Illness   [] Social Work Referral [x] Initial Assessment  [] Other     Sources of Information:    [x]Patient  [x]Family  [x]Staff  [x]Medical Record    Advance Care Planning:  Advance Care Planning 1/31/2018   Patient's Healthcare Decision Maker is: Legal Next of Kin   Primary Decision Maker Name -   Primary Decision Maker Relationship to Patient -   Confirm Advance Directive None       Mental Status:    [x]Alert  []Lethargic  []Unresponsive   [] Unable to assess   Oriented to:  [x]Person  [x]Place  [x]Time  [x]Situation      Barriers to Learning:    []Language  []Developmental  []Cognitive  []Altered Mental Status  [x]Visual/Hearing Impairment  []Unable to Read/Write  []Motivational   []No Barriers Identified  []Other:    Relationship Status:  []Single  [x]  []Significant Other/Life Partner  []  []  []      Living Circumstances:  []Lives Alone  [x]Family/Significant Other in Household  []Roommates  []Children in the Home  []Paid Caregivers  []Assisted Living Facility/Group [facility]  []Homeless  []Incarcerated  []Environmental/Care Concerns  []other:    Employment Status:  []Employed Full-time []Employed Part-time []Homemaker [] Disabled  [x] Retired []Other:    Support System:    [x]Strong  []Fair  []Limited    Financial/Legal Concerns:    []Uninsured  []Limited Income/Resources  []Non-Citizen  [x]No Concerns Identified  []Financial POA:    []Other:    Denominational/Spiritual/Existential:  []Strong Sense of Spirituality  []Involved in [employer]  []Request  Visit  []Expressing Spiritual/Existential Angst  [x]No Concerns Identified    Coping with Illness:         Patient: Family/Caregiver:   Understanding and Acceptance of Illness/Prognosis  [x] [x]   Strong Sense of Resilience [] []   Self Reflection [] []   Engaged Support System [x] []   Does not Readily Discuss Illness [] []   Denial of Terminal Status [] []   Anger [] []   Depression [] []   Anxiety/Fear [] []   Bargaining [] []   Recent Diagnosis/Prognosis [x] []   Difficulties with Body Image [] []   Loss of Identity [] []   Excessive Substance Use [] []   Mental Health History [] []   Enmeshed Relationships [] []   History of Loss [] []   Anticipatory Grief [] []   Concern for Complicated Grief [] []   Suicidal Ideation or Plan [] []   Unable to assess [] []            Narrative: Met with patient and his daughter to introduce social work navigator role and supports. Patient lives with his wife and a nursing facility. Their daughter lives 40 minutes away but comes to visit them twice a week and assists with all appointments. Reviewed barriers to care and none identified at this time. He is will supported with practical and emotional needs. Patient reports he has an AMD and his daughter is his healthcare decision maker. Encouraged to provide copy to place of file. Assessment/Action:   1. Patient is well supported with practical and emotional needs. 2. Ongoing psychosocial support as needed.      Thank you,  Davis Leonard LCSW

## 2020-04-15 ENCOUNTER — TELEPHONE (OUTPATIENT)
Dept: ONCOLOGY | Age: 85
End: 2020-04-15

## 2020-04-15 NOTE — TELEPHONE ENCOUNTER
LVM for patient to see if patient can do a virtual appointment or to move patient out a few months out

## 2020-06-12 ENCOUNTER — APPOINTMENT (OUTPATIENT)
Dept: CT IMAGING | Age: 85
End: 2020-06-12
Attending: EMERGENCY MEDICINE
Payer: MEDICARE

## 2020-06-12 ENCOUNTER — HOSPITAL ENCOUNTER (EMERGENCY)
Age: 85
Discharge: HOME | End: 2020-06-12
Attending: EMERGENCY MEDICINE
Payer: MEDICARE

## 2020-06-12 ENCOUNTER — APPOINTMENT (OUTPATIENT)
Dept: GENERAL RADIOLOGY | Age: 85
End: 2020-06-12
Attending: EMERGENCY MEDICINE
Payer: MEDICARE

## 2020-06-12 VITALS
HEART RATE: 76 BPM | OXYGEN SATURATION: 91 % | TEMPERATURE: 97.9 F | RESPIRATION RATE: 14 BRPM | DIASTOLIC BLOOD PRESSURE: 66 MMHG | SYSTOLIC BLOOD PRESSURE: 151 MMHG
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
## 2020-06-12 DIAGNOSIS — M54.50 ACUTE RIGHT-SIDED LOW BACK PAIN WITHOUT SCIATICA: ICD-10-CM

## 2020-06-12 PROCEDURE — 74011250637 HC RX REV CODE- 250/637: Performed by: EMERGENCY MEDICINE

## 2020-06-12 PROCEDURE — 72100 X-RAY EXAM L-S SPINE 2/3 VWS: CPT

## 2020-06-12 PROCEDURE — 70450 CT HEAD/BRAIN W/O DYE: CPT

## 2020-06-12 PROCEDURE — 73502 X-RAY EXAM HIP UNI 2-3 VIEWS: CPT

## 2020-06-12 PROCEDURE — 99284 EMERGENCY DEPT VISIT MOD MDM: CPT

## 2020-06-12 RX ORDER — ACETAMINOPHEN 325 MG/1
650 TABLET ORAL
Status: COMPLETED | OUTPATIENT
Start: 2020-06-12 | End: 2020-06-12

## 2020-06-12 RX ORDER — GUAIFENESIN 100 MG/5ML
81 LIQUID (ML) ORAL DAILY
COMMUNITY

## 2020-06-12 RX ADMIN — ACETAMINOPHEN 650 MG: 325 TABLET ORAL at 19:00

## 2020-06-12 NOTE — ED TRIAGE NOTES
Pt arrives via EMS they report pt fell inside the home and c/o right hip and lower back pain. Daughter at the bedside states that he was heading out to the front porch with her and when he got tot he door he picked up his walker and tipped back landing on the carpet floor onto his butt and his head landed on the dog bed. NO LOC pt is c/o lower back/tailbone pain and right hip pain denies any neck pain.   DR Eugenie Srerano at the bedside

## 2020-06-12 NOTE — ED PROVIDER NOTES
Date of Service:  6/12/2020    Patient:  Faith Rose    Chief Complaint:  Elly Rico       HPI:  Faith Rose is a 81 y.o.  male who presents for evaluation of a fall. Patient was trying to walk down the steps when he picked up his walker which threw him off balance and he fell backwards. This was a witnessed fall. He did hit his head on the dog bed. There was no loss of consciousness. Ever since, patient complained of some right hip pain lower back pain and no other acute complaints. He does not quantify the pain. He already has chronic right hip pain after replacement. No left hip pain. No other acute complaints. Patient does not take blood thinners.   Pain is minimal, nonradiating localized to the areas described           Past Medical History:   Diagnosis Date    Anxiety state, unspecified     Arthritis     Cancer ([facility] [address] 2001    prostate,     Depression     GERD (gastroesophageal reflux disease)     Hiatal hernia     Hypercholesteremia     Renal calculus     Seasonal allergic rhinitis        Past Surgical History:   Procedure Laterality Date    ENDOSCOPY, COLON, DIAGNOSTIC      HX APPENDECTOMY      HX CATARACT REMOVAL  2012    HX COLONOSCOPY      HX ENDOSCOPY      HX GI      colonoscopy    HX KNEE REPLACEMENT  02/18/14    right total knee    HX KNEE REPLACEMENT Left 10/2015    HX LITHOTRIPSY  11/2011    HX ORTHOPAEDIC  1992    rt. carpal tunnel    HX PROSTATECTOMY  2003    bowel incontinence since surgery    HX TONSILLECTOMY           Family History:   Problem Relation Age of Onset    Heart Disease Mother     Stroke Mother     Cancer Father         COLON    Arthritis-rheumatoid Neg Hx     Malignant Hyperthermia Neg Hx     Pseudocholinesterase Deficiency Neg Hx     Delayed Awakening Neg Hx     Post-op Nausea/Vomiting Neg Hx     Post-op Cognitive Dysfunction Neg Hx     Emergence Delirium Neg Hx        Social History     Socioeconomic History    Marital status:  Spouse name: Not on file    Number of children: Not on file    Years of education: Not on file    Highest education level: Not on file   Occupational History    Not on file   Social Needs    Financial resource strain: Not on file    Food insecurity     Worry: Not on file     Inability: Not on file    Transportation needs     Medical: Not on file     Non-medical: Not on file   Tobacco Use    Smoking status: Never Smoker    Smokeless tobacco: Never Used   Substance and Sexual Activity    Alcohol use: No    Drug use: No    Sexual activity: Not Currently   Lifestyle    Physical activity     Days per week: Not on file     Minutes per session: Not on file    Stress: Not on file   Relationships    Social connections     Talks on phone: Not on file     Gets together: Not on file     Attends [employer] service: Not on file     Active member of club or organization: Not on file     Attends meetings of clubs or organizations: Not on file     Relationship status: Not on file    Intimate partner violence     Fear of current or ex partner: Not on file     Emotionally abused: Not on file     Physically abused: Not on file     Forced sexual activity: Not on file   Other Topics Concern    Not on file   Social History Narrative    Not on file         ALLERGIES: Codeine; Flu vac 2015 (65 up)-mf59c(pf); Fluvirin 3529-2343; and Naprosyn [naproxen]    Review of Systems   Constitutional: Negative for fever. HENT: Negative for hearing loss, sore throat and voice change. Eyes: Negative for visual disturbance. Respiratory: Negative for shortness of breath. Cardiovascular: Negative for chest pain. Gastrointestinal: Negative for abdominal pain, diarrhea and vomiting. Genitourinary: Negative for flank pain. Musculoskeletal: Positive for back pain. Skin: Negative for rash. Neurological: Negative for dizziness, syncope, speech difficulty and light-headedness.    Psychiatric/Behavioral: Negative for confusion and suicidal ideas. There were no vitals filed for this visit. Physical Exam  Vitals signs and nursing note reviewed. Constitutional:       General: He is not in acute distress. Appearance: Normal appearance. He is well-developed. He is not ill-appearing, toxic-appearing or diaphoretic. HENT:      Head: Normocephalic and atraumatic. Right Ear: Tympanic membrane, ear canal and external ear normal. There is no impacted cerumen. Left Ear: Tympanic membrane, ear canal and external ear normal. There is no impacted cerumen. Nose: Nose normal. No congestion or rhinorrhea. Mouth/Throat:      Mouth: Mucous membranes are moist.      Pharynx: No oropharyngeal exudate. Eyes:      General: No scleral icterus. Right eye: No discharge. Left eye: No discharge. Extraocular Movements: Extraocular movements intact. Conjunctiva/sclera: Conjunctivae normal.      Pupils: Pupils are equal, round, and reactive to light. Neck:      Musculoskeletal: Normal range of motion and neck supple. No muscular tenderness. Vascular: No JVD. Trachea: No tracheal deviation. Cardiovascular:      Rate and Rhythm: Normal rate and regular rhythm. Heart sounds: No murmur. No friction rub. Pulmonary:      Effort: Pulmonary effort is normal. No respiratory distress. Breath sounds: Normal breath sounds. Abdominal:      General: Abdomen is flat. There is no distension. Palpations: Abdomen is soft. Tenderness: There is no abdominal tenderness. Musculoskeletal: Normal range of motion. General: No tenderness or deformity. Right lower leg: Edema present. Left lower leg: Edema present. Comments: No c/t/l spine tenderness  Normal (for patient) Rom at right hip   Skin:     General: Skin is warm and dry. Capillary Refill: Capillary refill takes less than 2 seconds. Findings: No rash.    Neurological:      Mental Status: He is alert and oriented to person, place, and time. Mental status is at baseline. Psychiatric:         Mood and Affect: Mood normal.         Behavior: Behavior normal.          MDM  Number of Diagnoses or Management Options  Acute right-sided low back pain without sciatica:   Fall, initial encounter:        VITAL SIGNS:  Patient Vitals for the past 4 hrs:   Temp Pulse Resp BP SpO2   06/12/20 1730    151/66 91 %   06/12/20 1720 97.9 °F (36.6 °C) 76 14 163/64 93 %         LABS:  No results found for this or any previous visit (from the past 6 hour(s)). IMAGING:  XR HIP RT W OR WO PELV 2-3 VWS   Final Result   IMPRESSION:    Osteopenia. Postoperative findings. No acute fracture or dislocation   demonstrated. XR SPINE LUMB 2 OR 3 V   Final Result   IMPRESSION: Osteopenia. No vertebral compression demonstrated. CT HEAD WO CONT   Final Result   IMPRESSION:    No acute findings. Medications During Visit:  Medications   acetaminophen (TYLENOL) tablet 650 mg (has no administration in time range)         DECISION MAKING:  Bautista Adler is a 81 y.o. male who comes in as above. Imaging as above. Patient appears well. Will dc home with out patient. IMPRESSION:  1. Fall, initial encounter    2. Acute right-sided low back pain without sciatica        DISPOSITION:  Discharged      Current Discharge Medication List           Follow-up Information     Follow up With Specialties Details Why Contact Info    Lizzie Randall MD [facility] Schedule an appointment as soon as possible for a visit   [address] [facility] [address] [id number] [phone]              The patient is asked to follow-up with their primary care provider in the next several days. They are to call tomorrow for an appointment. The patient is asked to return promptly for any increased concerns or worsening of symptoms. They can return to this emergency department or any other emergency department.     Procedures

## 2020-06-12 NOTE — ED NOTES
The patient was discharged home by emergency department MD .Patient given paper copy of discharge instructions with 0 paper prescriptions and 0 electronic prescriptions. Patient verbalized understanding of discharge instructions. Patient given a current medication reconciliation list with discharge instruction. No work/school note given. Patient alert and oriented and in no acute distress at discharge. Patient assisted with dressing by 2 RN's. Patient taken to Ed lobby via wheelchair for discharge.

## 2020-07-20 ENCOUNTER — HOSPITAL ENCOUNTER (OUTPATIENT)
Dept: NON INVASIVE DIAGNOSTICS | Age: 85
Discharge: HOME | End: 2020-07-20
Attending: FAMILY MEDICINE
Payer: MEDICARE

## 2020-07-20 VITALS
BODY MASS INDEX: 32.63 KG/M2 | WEIGHT: 207.89 LBS | DIASTOLIC BLOOD PRESSURE: 67 MMHG | HEIGHT: 67 IN | SYSTOLIC BLOOD PRESSURE: 133 MMHG

## 2020-07-20 DIAGNOSIS — R06.00 DYSPNEA, UNSPECIFIED: ICD-10-CM

## 2020-07-20 LAB
ECHO AO ASC DIAM: 3.51 CM
ECHO AO ROOT DIAM: 4.04 CM
ECHO AR MAX VEL PISA: 333.35 CENTIMETER/SECOND
ECHO AV AREA PEAK VELOCITY: 3.04 CM2
ECHO AV AREA/BSA PEAK VELOCITY: 1.5 CM2/M2
ECHO AV PEAK GRADIENT: 7.32 MMHG
ECHO AV PEAK VELOCITY: 135.24 CM/S
ECHO AV REGURGITANT PHT: 0.45 S
ECHO IVC PROX: 1.96 CM
ECHO LA AREA 4C: 13.11 CM2
ECHO LA MAJOR AXIS: 3.34 CM
ECHO LA MINOR AXIS: 1.62 CM
ECHO LA VOL 2C: 44.86 ML (ref 18–58)
ECHO LA VOL 4C: 28.17 ML (ref 18–58)
ECHO LA VOL BP: 38.7 ML (ref 18–58)
ECHO LA VOL/BSA BIPLANE: 18.83 ML/M2 (ref 16–28)
ECHO LA VOLUME INDEX A2C: 21.82 ML/M2 (ref 16–28)
ECHO LA VOLUME INDEX A4C: 13.7 ML/M2 (ref 16–28)
ECHO LV E' LATERAL VELOCITY: 6.23 CENTIMETER/SECOND
ECHO LV E' SEPTAL VELOCITY: 6.45 CENTIMETER/SECOND
ECHO LV INTERNAL DIMENSION DIASTOLIC: 4.39 CM (ref 4.2–5.9)
ECHO LV INTERNAL DIMENSION SYSTOLIC: 2.78 CM
ECHO LV IVSD: 1.01 CM (ref 0.6–1)
ECHO LV MASS 2D: 145.3 G (ref 88–224)
ECHO LV MASS INDEX 2D: 70.7 G/M2 (ref 49–115)
ECHO LV POSTERIOR WALL DIASTOLIC: 0.97 CM (ref 0.6–1)
ECHO LVOT DIAM: 2.28 CM
ECHO LVOT PEAK GRADIENT: 4.04 MMHG
ECHO LVOT PEAK VELOCITY: 100.48 CM/S
ECHO MV A VELOCITY: 117.6 CENTIMETER/SECOND
ECHO MV AREA PHT: 3.05 CM2
ECHO MV E DECELERATION TIME (DT): 0.25 S
ECHO MV E VELOCITY: 99.43 CENTIMETER/SECOND
ECHO MV PRESSURE HALF TIME (PHT): 0.07 S
ECHO PV MAX VELOCITY: 84.39 CM/S
ECHO PV PEAK INSTANTANEOUS GRADIENT SYSTOLIC: 2.85 MMHG
ECHO RV INTERNAL DIMENSION: 4.16 CM
ECHO RV TAPSE: 2.09 CM (ref 1.5–2)
ECHO TV REGURGITANT MAX VELOCITY: 212.75 CM/S
ECHO TV REGURGITANT PEAK GRADIENT: 18.11 MMHG
LA VOL DISK BP: 36.58 ML (ref 18–58)

## 2020-07-20 PROCEDURE — 93306 TTE W/DOPPLER COMPLETE: CPT

## 2020-07-24 ENCOUNTER — TELEPHONE (OUTPATIENT)
Dept: ONCOLOGY | Age: 85
End: 2020-07-24

## 2020-07-28 ENCOUNTER — TELEPHONE (OUTPATIENT)
Dept: ONCOLOGY | Age: 85
End: 2020-07-28

## 2020-07-30 ENCOUNTER — TELEPHONE (OUTPATIENT)
Dept: ONCOLOGY | Age: 85
End: 2020-07-30

## 2020-08-28 NOTE — PROGRESS NOTES
[facility] [address], [facility]  Patty Carpio: [phone]  F: [phone]      Reason for Visit:   Guzman Trimble is a 81 y.o. male who is seen by synchronous (real-time) audio-video technology for follow up of prostate cancer. Treatment History:   · Pre-treatment PSA 0.9  · Prostatectomy 4/1/2002: [facility] 3+3=6 adenocarcinoma, pT3a pN0 M0  · Radiation 12/2006  · ADT with Lupron beginning 4/26/2011 with Dr. Martin Phillip  · CT A/P 10/17/2019: Slight interval enlargement of suspected metastatic left pelvic adenopathy. No newly identified metastatic disease status post prostatectomy. · Bone scan 10/17/2019: Degenerative and postoperative changes. No evidence of metastatic disease. · Abiraterone and Prednisone beginning 12/2019 with Dr. Martin Phillip    History of Present Illness:   I saw him last in 11/2019, and he subsequently started abiraterone with [facility]. He reports doing well. Taking the zytiga and tolerating it well. PSA has come down. Not having any side effects. Denies pain. Doing physical therapy, walking around the house and staying active at home. PAST HISTORY: The following sections were reviewed and updated in the EMR as appropriate: PMH, SH, FH, Medications, Allergies. Allergies   Allergen Reactions    Codeine Nausea and Vomiting    Flu Vac 2015 (65 Up)-Mf59c(Pf) Nausea Only    Fluvirin 3486-0455 Other (comments)     GI upset    Naprosyn [Naproxen] Nausea and Vomiting     Gas        Review of Systems: A complete review of systems was obtained, reviewed, and scanned into the EMR. Pertinent findings reviewed above. Physical Exam:     There were no vitals taken for this visit.   ECOG PS: 3  General: alert, cooperative, no distress   Mental  status: normal mood, behavior, speech, dress, motor activity, and thought processes, able to follow commands   HENT: NCAT   Neck: no visualized mass   Resp: no respiratory distress Neuro: no gross deficits   Skin: no discoloration or lesions of concern on visible areas   Psychiatric: normal affect, consistent with stated mood, no evidence of hallucinations       Due to this being a TeleHealth evaluation (During URB-16 public health emergency), many elements of the physical examination are unable to be assessed. Evaluation of the following organ systems was limited: Vitals/Constitutional/EENT/Resp/CV/GI//MS/Neuro/Skin/Heme-Lymph-Imm. Results:     Lab Results   Component Value Date/Time    WBC 6.6 02/05/2018 05:47 AM    HGB 8.2 (L) 02/05/2018 05:47 AM    HCT 27.2 (L) 02/05/2018 05:47 AM    PLATELET 035 13/46/1323 05:47 AM    MCV 91.9 02/05/2018 05:47 AM    ABS. NEUTROPHILS 4.6 02/05/2018 05:47 AM    Hemoglobin (POC) 13.6 09/11/2014 06:48 PM    Hematocrit (POC) 40 09/11/2014 06:48 PM     Lab Results   Component Value Date/Time    Sodium 142 02/05/2018 05:47 AM    Potassium 4.0 02/05/2018 05:47 AM    Chloride 103 02/05/2018 05:47 AM    CO2 33 (H) 02/05/2018 05:47 AM    Glucose 98 02/05/2018 05:47 AM    BUN 10 02/05/2018 05:47 AM    Creatinine 0.89 02/05/2018 05:47 AM    GFR est AA >60 02/05/2018 05:47 AM    GFR est non-AA >60 02/05/2018 05:47 AM    Calcium 8.9 02/05/2018 05:47 AM    Sodium (POC) 143 09/11/2014 06:48 PM    Potassium (POC) 3.5 09/11/2014 06:48 PM    Chloride (POC) 105 09/11/2014 06:48 PM    Glucose (POC) 103 09/11/2014 06:48 PM    BUN (POC) 13 09/11/2014 06:48 PM    Creatinine (POC) 1.5 (H) 09/11/2014 06:48 PM    Calcium, ionized (POC) 1.18 09/11/2014 06:48 PM     Lab Results   Component Value Date/Time    Bilirubin, total 0.4 01/04/2018 11:28 AM    ALT (SGPT) 21 01/04/2018 11:28 AM    Alk.  phosphatase 117 01/04/2018 11:28 AM    Protein, total 7.0 01/04/2018 11:28 AM    Albumin 3.3 (L) 01/04/2018 11:28 AM    Globulin 3.7 01/04/2018 11:28 AM         Date  PSA  06/09/2020 6  04/14/2020 8.36  03/12/2020 12.60  12/04/2019 12.36  Started Abiraterone and Prednisone  10/11/2019 11.84  03/26/2019 5.05  09/25/2018 4.53  03/22/2018 4.67  01/26/2018 5.46  09/01/2017 0.618  04/21/2017 0.19  12/08/2016 <0.1  07/21/2016 0.129  02/23/2016 0.199  01/29/2016 1.97  07/10/2015 0.294  04/14/2015 0.505  12/04/2014 3.58  06/12/2014 0.937    10/11/2019  Tesosterone: 2.5    [facility] genetic testing 11/2019: negative    Assessment:   1) Castrate resistant prostate cancer  Germline BRCA negative  He is s/p prostatectomy in 2002, followed by salvage radiation in 2006, and subsequently initiated ADT in 2011. He now has castrate resistant disease based on persistently rising PSA with adequately suppressed testosterone. He has some pelvic adenopathy, but no other evidence of metastatic disease on imaging. He has initiated abiraterone and prednisone with Dr. Jonh Villa and he is tolerating this well. PSA has been coming down nicely. I recommend he continue with current therapy. At progression we can consider enzalutamide, though there are some concerns with this agent given the patient's problems with balance. If he develops symptomatic osseous disease, Jennifer Daniela may be another option. He is not a good candidate for palliative chemotherapy given his current performance status. Likewise, provenge would be a challenge to administer. He will continue with therapy under the direction of Dr. Jonh Villa. I will see him again in 6 months to follow along and assist as needed. He should continue with Lupron indefinitely, which he is receiving with [facility]. We previously performed [facility] Prostate Detect which was negative for BRCA or other mutations causing HRR deficiency. 2) Bone Health  He is at risk of osteoporosis while on ADT. He is currently receiving prolia with [facility]. Recommend DEXA every 2 years, defer to urology. Continue calicum and vitamin D.       Plan:     · Continue abiraterone and prednisone with Dr. Jonh Villa  · Continue Lupron long-term, [facility]  · Return to see me in 6 months, or sooner as needed    He is hard of hearing and had difficulty with the video visit today, we will plan to do his next visit in person. Signed By: Wolf Arroyo MD      I was in the office while conducting this encounter. The patient was at his home. Consent:  He and/or his healthcare decision maker is aware that this patient-initiated Telehealth encounter is a billable service, with coverage as determined by his insurance carrier. He is aware that he may receive a bill and has provided verbal consent to proceed: Yes    Pursuant to the emergency declaration under the [address] and the [facility] [address] waiver authority and the [employer] Act, this Virtual  Visit was conducted, with patient's (and/or legal guardian's) consent, to reduce the patient's risk of exposure to COVID-19 and provide necessary medical care. Services were provided through a video synchronous discussion virtually to substitute for in-person visit.

## 2020-09-02 ENCOUNTER — VIRTUAL VISIT (OUTPATIENT)
Dept: ONCOLOGY | Age: 85
End: 2020-09-02
Payer: MEDICARE

## 2020-09-02 DIAGNOSIS — C61 PROSTATE CANCER (HCC): Primary | ICD-10-CM

## 2020-09-02 PROCEDURE — G8419 CALC BMI OUT NRM PARAM NOF/U: HCPCS | Performed by: INTERNAL MEDICINE

## 2020-09-02 PROCEDURE — 99213 OFFICE O/P EST LOW 20 MIN: CPT | Performed by: INTERNAL MEDICINE

## 2020-09-02 PROCEDURE — G8536 NO DOC ELDER MAL SCRN: HCPCS | Performed by: INTERNAL MEDICINE

## 2020-09-02 PROCEDURE — G8756 NO BP MEASURE DOC: HCPCS | Performed by: INTERNAL MEDICINE

## 2020-09-02 PROCEDURE — G9717 DOC PT DX DEP/BP F/U NT REQ: HCPCS | Performed by: INTERNAL MEDICINE

## 2020-09-02 PROCEDURE — G8427 DOCREV CUR MEDS BY ELIG CLIN: HCPCS | Performed by: INTERNAL MEDICINE

## 2020-09-02 PROCEDURE — 1101F PT FALLS ASSESS-DOCD LE1/YR: CPT | Performed by: INTERNAL MEDICINE

## 2020-09-02 NOTE — PROGRESS NOTES
Chidi Garces is a 81 y.o. male follow up for prostate cancer. 1. Have you been to the ER, urgent care clinic since your last visit? Hospitalized since your last visit?no     2. Have you seen or consulted any other health care providers outside of the [address] [place] [address] since your last visit? Include any pap smears or colon screening.  no

## 2021-03-04 ENCOUNTER — TELEPHONE (OUTPATIENT)
Dept: ONCOLOGY | Age: 86
End: 2021-03-04

## 2021-04-05 ENCOUNTER — TELEPHONE (OUTPATIENT)
Dept: ONCOLOGY | Age: 86
End: 2021-04-05

## 2021-06-30 ENCOUNTER — HOSPITAL ENCOUNTER (OUTPATIENT)
Dept: GENERAL RADIOLOGY | Age: 86
Discharge: HOME | End: 2021-06-30
Attending: FAMILY MEDICINE
Payer: MEDICARE

## 2021-06-30 ENCOUNTER — TRANSCRIBE ORDER (OUTPATIENT)
Dept: GENERAL RADIOLOGY | Age: 86
End: 2021-06-30

## 2021-06-30 DIAGNOSIS — R05.9 COUGH: ICD-10-CM

## 2021-06-30 DIAGNOSIS — R05.9 COUGH: Primary | ICD-10-CM

## 2021-06-30 PROCEDURE — 71046 X-RAY EXAM CHEST 2 VIEWS: CPT

## 2022-03-19 PROBLEM — M97.8XXA PERI-PROSTHETIC FRACTURE OF FEMUR FOLLOWING TOTAL HIP ARTHROPLASTY: Status: ACTIVE | Noted: 2018-01-30

## 2022-03-19 PROBLEM — M97.01XA PERIPROSTHETIC FRACTURE AROUND INTERNAL PROSTHETIC RIGHT HIP JOINT (HCC): Status: ACTIVE | Noted: 2018-02-03

## 2022-03-19 PROBLEM — M16.11 PRIMARY OSTEOARTHRITIS OF RIGHT HIP: Status: ACTIVE | Noted: 2018-01-15

## 2022-03-19 PROBLEM — M97.9XXA PERIPROSTHETIC FRACTURE AROUND INTERNAL PROSTHETIC JOINT: Status: ACTIVE | Noted: 2018-01-31

## 2022-03-19 PROBLEM — Z96.649 PERI-PROSTHETIC FRACTURE OF FEMUR FOLLOWING TOTAL HIP ARTHROPLASTY: Status: ACTIVE | Noted: 2018-01-30

## 2022-03-19 PROBLEM — Z96.649 S/P TOTAL HIP ARTHROPLASTY: Status: ACTIVE | Noted: 2018-01-15

## 2022-03-20 PROBLEM — C77.2 METASTASIS TO RETROPERITONEAL LYMPH NODE (HCC): Status: ACTIVE | Noted: 2019-11-07

## 2023-02-19 ENCOUNTER — APPOINTMENT (OUTPATIENT)
Dept: CT IMAGING | Age: 88
End: 2023-02-19
Attending: EMERGENCY MEDICINE
Payer: MEDICARE

## 2023-02-19 ENCOUNTER — HOSPITAL ENCOUNTER (OUTPATIENT)
Age: 88
Setting detail: OBSERVATION
Discharge: HOME HEALTH | End: 2023-02-20
Attending: EMERGENCY MEDICINE | Admitting: INTERNAL MEDICINE
Payer: MEDICARE

## 2023-02-19 ENCOUNTER — APPOINTMENT (OUTPATIENT)
Dept: GENERAL RADIOLOGY | Age: 88
End: 2023-02-19
Attending: EMERGENCY MEDICINE
Payer: MEDICARE

## 2023-02-19 DIAGNOSIS — R26.9 GAIT ABNORMALITY: ICD-10-CM

## 2023-02-19 DIAGNOSIS — R53.1 WEAKNESS: Primary | ICD-10-CM

## 2023-02-19 DIAGNOSIS — N28.9 ACUTE RENAL INSUFFICIENCY: ICD-10-CM

## 2023-02-19 DIAGNOSIS — R41.82 ALTERED MENTAL STATUS, UNSPECIFIED ALTERED MENTAL STATUS TYPE: ICD-10-CM

## 2023-02-19 PROBLEM — G93.41 ACUTE METABOLIC ENCEPHALOPATHY: Status: ACTIVE | Noted: 2023-02-19

## 2023-02-19 LAB
ALBUMIN SERPL-MCNC: 2.9 G/DL (ref 3.5–5)
ALBUMIN/GLOB SERPL: 0.7 (ref 1.1–2.2)
ALP SERPL-CCNC: 119 U/L (ref 45–117)
ALT SERPL-CCNC: 25 U/L (ref 12–78)
ANION GAP SERPL CALC-SCNC: 8 MMOL/L (ref 5–15)
APPEARANCE UR: CLEAR
AST SERPL-CCNC: 21 U/L (ref 15–37)
BACTERIA URNS QL MICRO: NEGATIVE /HPF
BASOPHILS # BLD: 0 K/UL (ref 0–0.1)
BASOPHILS NFR BLD: 0 % (ref 0–1)
BILIRUB SERPL-MCNC: 0.7 MG/DL (ref 0.2–1)
BILIRUB UR QL: NEGATIVE
BUN SERPL-MCNC: 17 MG/DL (ref 6–20)
BUN/CREAT SERPL: 11 (ref 12–20)
CALCIUM SERPL-MCNC: 9 MG/DL (ref 8.5–10.1)
CHLORIDE SERPL-SCNC: 103 MMOL/L (ref 97–108)
CO2 SERPL-SCNC: 32 MMOL/L (ref 21–32)
COLOR UR: NORMAL
COMMENT, HOLDF: NORMAL
CREAT SERPL-MCNC: 1.5 MG/DL (ref 0.7–1.3)
DIFFERENTIAL METHOD BLD: ABNORMAL
EOSINOPHIL # BLD: 0.6 K/UL (ref 0–0.4)
EOSINOPHIL NFR BLD: 7 % (ref 0–7)
EPITH CASTS URNS QL MICRO: NORMAL /LPF
ERYTHROCYTE [DISTWIDTH] IN BLOOD BY AUTOMATED COUNT: 14.9 % (ref 11.5–14.5)
GLOBULIN SER CALC-MCNC: 4.1 G/DL (ref 2–4)
GLUCOSE SERPL-MCNC: 94 MG/DL (ref 65–100)
GLUCOSE UR STRIP.AUTO-MCNC: NEGATIVE MG/DL
HCT VFR BLD AUTO: 37.3 % (ref 36.6–50.3)
HGB BLD-MCNC: 11.5 G/DL (ref 12.1–17)
HGB UR QL STRIP: NEGATIVE
IMM GRANULOCYTES # BLD AUTO: 0.1 K/UL (ref 0–0.04)
IMM GRANULOCYTES NFR BLD AUTO: 1 % (ref 0–0.5)
KETONES UR QL STRIP.AUTO: NEGATIVE MG/DL
LEUKOCYTE ESTERASE UR QL STRIP.AUTO: NEGATIVE
LYMPHOCYTES # BLD: 1 K/UL (ref 0.8–3.5)
LYMPHOCYTES NFR BLD: 13 % (ref 12–49)
MCH RBC QN AUTO: 27.9 PG (ref 26–34)
MCHC RBC AUTO-ENTMCNC: 30.8 G/DL (ref 30–36.5)
MCV RBC AUTO: 90.5 FL (ref 80–99)
MONOCYTES # BLD: 0.8 K/UL (ref 0–1)
MONOCYTES NFR BLD: 10 % (ref 5–13)
NEUTS SEG # BLD: 5.3 K/UL (ref 1.8–8)
NEUTS SEG NFR BLD: 69 % (ref 32–75)
NITRITE UR QL STRIP.AUTO: NEGATIVE
NRBC # BLD: 0 K/UL (ref 0–0.01)
NRBC BLD-RTO: 0 PER 100 WBC
PH UR STRIP: 5.5 (ref 5–8)
PLATELET # BLD AUTO: 189 K/UL (ref 150–400)
PMV BLD AUTO: 10.6 FL (ref 8.9–12.9)
POTASSIUM SERPL-SCNC: 3.8 MMOL/L (ref 3.5–5.1)
PROT SERPL-MCNC: 7 G/DL (ref 6.4–8.2)
PROT UR STRIP-MCNC: NEGATIVE MG/DL
RBC # BLD AUTO: 4.12 M/UL (ref 4.1–5.7)
RBC #/AREA URNS HPF: NORMAL /HPF (ref 0–5)
SAMPLES BEING HELD,HOLD: NORMAL
SODIUM SERPL-SCNC: 143 MMOL/L (ref 136–145)
SP GR UR REFRACTOMETRY: 1.02 (ref 1–1.03)
TROPONIN I SERPL HS-MCNC: 19 NG/L (ref 0–76)
UR CULT HOLD, URHOLD: NORMAL
UROBILINOGEN UR QL STRIP.AUTO: 0.2 EU/DL (ref 0.2–1)
WBC # BLD AUTO: 7.7 K/UL (ref 4.1–11.1)
WBC URNS QL MICRO: NORMAL /HPF (ref 0–4)

## 2023-02-19 PROCEDURE — 99285 EMERGENCY DEPT VISIT HI MDM: CPT

## 2023-02-19 PROCEDURE — 85025 COMPLETE CBC W/AUTO DIFF WBC: CPT

## 2023-02-19 PROCEDURE — 70450 CT HEAD/BRAIN W/O DYE: CPT

## 2023-02-19 PROCEDURE — 74011250636 HC RX REV CODE- 250/636: Performed by: EMERGENCY MEDICINE

## 2023-02-19 PROCEDURE — 96372 THER/PROPH/DIAG INJ SC/IM: CPT

## 2023-02-19 PROCEDURE — 71045 X-RAY EXAM CHEST 1 VIEW: CPT

## 2023-02-19 PROCEDURE — 74011000250 HC RX REV CODE- 250: Performed by: EMERGENCY MEDICINE

## 2023-02-19 PROCEDURE — 74011000250 HC RX REV CODE- 250: Performed by: INTERNAL MEDICINE

## 2023-02-19 PROCEDURE — 81001 URINALYSIS AUTO W/SCOPE: CPT

## 2023-02-19 PROCEDURE — 80053 COMPREHEN METABOLIC PANEL: CPT

## 2023-02-19 PROCEDURE — 96360 HYDRATION IV INFUSION INIT: CPT

## 2023-02-19 PROCEDURE — 93005 ELECTROCARDIOGRAM TRACING: CPT

## 2023-02-19 PROCEDURE — G0378 HOSPITAL OBSERVATION PER HR: HCPCS

## 2023-02-19 PROCEDURE — 84484 ASSAY OF TROPONIN QUANT: CPT

## 2023-02-19 PROCEDURE — 36415 COLL VENOUS BLD VENIPUNCTURE: CPT

## 2023-02-19 PROCEDURE — 65390000012 HC CONDITION CODE 44 OBSERVATION

## 2023-02-19 RX ORDER — ONDANSETRON 2 MG/ML
4 INJECTION INTRAMUSCULAR; INTRAVENOUS
Status: DISCONTINUED | OUTPATIENT
Start: 2023-02-19 | End: 2023-02-20 | Stop reason: HOSPADM

## 2023-02-19 RX ORDER — ACETAMINOPHEN 325 MG/1
650 TABLET ORAL
Status: DISCONTINUED | OUTPATIENT
Start: 2023-02-19 | End: 2023-02-20 | Stop reason: HOSPADM

## 2023-02-19 RX ORDER — SODIUM CHLORIDE 0.9 % (FLUSH) 0.9 %
5-40 SYRINGE (ML) INJECTION EVERY 8 HOURS
Status: DISCONTINUED | OUTPATIENT
Start: 2023-02-19 | End: 2023-02-20 | Stop reason: HOSPADM

## 2023-02-19 RX ORDER — BUSPIRONE HYDROCHLORIDE 10 MG/1
10 TABLET ORAL 2 TIMES DAILY
Status: DISCONTINUED | OUTPATIENT
Start: 2023-02-20 | End: 2023-02-20 | Stop reason: HOSPADM

## 2023-02-19 RX ORDER — ZIPRASIDONE HYDROCHLORIDE 20 MG/1
20 CAPSULE ORAL
Status: DISCONTINUED | OUTPATIENT
Start: 2023-02-19 | End: 2023-02-19

## 2023-02-19 RX ORDER — SODIUM CHLORIDE 0.9 % (FLUSH) 0.9 %
5-40 SYRINGE (ML) INJECTION AS NEEDED
Status: DISCONTINUED | OUTPATIENT
Start: 2023-02-19 | End: 2023-02-20 | Stop reason: HOSPADM

## 2023-02-19 RX ORDER — FLUOXETINE HYDROCHLORIDE 20 MG/1
20 CAPSULE ORAL DAILY
Status: DISCONTINUED | OUTPATIENT
Start: 2023-02-20 | End: 2023-02-20 | Stop reason: HOSPADM

## 2023-02-19 RX ORDER — GABAPENTIN 400 MG/1
400 CAPSULE ORAL 2 TIMES DAILY
Status: ON HOLD | COMMUNITY
End: 2023-02-20 | Stop reason: SDUPTHER

## 2023-02-19 RX ORDER — ACETAMINOPHEN 650 MG/1
650 SUPPOSITORY RECTAL
Status: DISCONTINUED | OUTPATIENT
Start: 2023-02-19 | End: 2023-02-20

## 2023-02-19 RX ORDER — ENOXAPARIN SODIUM 100 MG/ML
40 INJECTION SUBCUTANEOUS DAILY
Status: DISCONTINUED | OUTPATIENT
Start: 2023-02-20 | End: 2023-02-20 | Stop reason: HOSPADM

## 2023-02-19 RX ORDER — PANTOPRAZOLE SODIUM 40 MG/1
40 TABLET, DELAYED RELEASE ORAL
Status: DISCONTINUED | OUTPATIENT
Start: 2023-02-20 | End: 2023-02-20 | Stop reason: HOSPADM

## 2023-02-19 RX ORDER — ATORVASTATIN CALCIUM 20 MG/1
40 TABLET, FILM COATED ORAL DAILY
Status: DISCONTINUED | OUTPATIENT
Start: 2023-02-20 | End: 2023-02-20 | Stop reason: HOSPADM

## 2023-02-19 RX ORDER — GUAIFENESIN 100 MG/5ML
81 LIQUID (ML) ORAL DAILY
Status: DISCONTINUED | OUTPATIENT
Start: 2023-02-20 | End: 2023-02-20 | Stop reason: HOSPADM

## 2023-02-19 RX ORDER — CHOLECALCIFEROL TAB 125 MCG (5000 UNIT) 125 MCG
TAB ORAL DAILY
COMMUNITY

## 2023-02-19 RX ORDER — ONDANSETRON 4 MG/1
4 TABLET, ORALLY DISINTEGRATING ORAL
Status: DISCONTINUED | OUTPATIENT
Start: 2023-02-19 | End: 2023-02-20

## 2023-02-19 RX ORDER — POLYETHYLENE GLYCOL 3350 17 G/17G
17 POWDER, FOR SOLUTION ORAL DAILY PRN
Status: DISCONTINUED | OUTPATIENT
Start: 2023-02-19 | End: 2023-02-20 | Stop reason: HOSPADM

## 2023-02-19 RX ADMIN — SODIUM CHLORIDE 1000 ML: 9 INJECTION, SOLUTION INTRAVENOUS at 15:07

## 2023-02-19 RX ADMIN — SODIUM CHLORIDE, PRESERVATIVE FREE 10 ML: 5 INJECTION INTRAVENOUS at 23:10

## 2023-02-19 RX ADMIN — ZIPRASIDONE MESYLATE 20 MG: 20 INJECTION, POWDER, LYOPHILIZED, FOR SOLUTION INTRAMUSCULAR at 20:05

## 2023-02-19 NOTE — ED NOTES
Spoke with daughter poly and gave her updated regarding patients care and transfer to [facility] ED for 1900 tonight.

## 2023-02-19 NOTE — ED PROVIDER NOTES
41-year-old male presents from home via EMS with complaints of \"itching to death. \"  His daughter arrived a bit later who explained to the patient exhibited a sudden decrease in his activity level and ability to walk over the past 2 days. He suddenly could not stand on his own and was unable to ambulate from his bedroom to the bathroom. She states he has been getting hydroxyzine and Keflex due to itching and scratching that he is exhibited over the past couple of weeks. She is not aware of any falls or head injuries. No fevers at home. States he has had a nonproductive cough but no vomiting or diarrhea. No history of stroke. Past medical history significant for prostate cancer, GERD, high cholesterol. She states he has not had anything to eat or drink since yesterday.        Past Medical History:   Diagnosis Date    Anxiety state, unspecified     Arthritis     Cancer ([facility] [address] 2001    prostate,     Depression     GERD (gastroesophageal reflux disease)     Hiatal hernia     Hypercholesteremia     Renal calculus     Seasonal allergic rhinitis        Past Surgical History:   Procedure Laterality Date    ENDOSCOPY, COLON, DIAGNOSTIC      HX APPENDECTOMY      HX CATARACT REMOVAL  2012    HX COLONOSCOPY      HX ENDOSCOPY      HX GI      colonoscopy    HX KNEE REPLACEMENT  02/18/14    right total knee    HX KNEE REPLACEMENT Left 10/2015    HX LITHOTRIPSY  11/2011    HX ORTHOPAEDIC  1992    rt. carpal tunnel    HX PROSTATECTOMY  2003    bowel incontinence since surgery    HX TONSILLECTOMY           Family History:   Problem Relation Age of Onset    Heart Disease Mother     Stroke Mother     Cancer Father         COLON    Arthritis-rheumatoid Neg Hx     Malignant Hyperthermia Neg Hx     Pseudocholinesterase Deficiency Neg Hx     Delayed Awakening Neg Hx     Post-op Nausea/Vomiting Neg Hx     Post-op Cognitive Dysfunction Neg Hx     Emergence Delirium Neg Hx        Social History     Socioeconomic History    Marital status:      Spouse name: Not on file    Number of children: Not on file    Years of education: Not on file    Highest education level: Not on file   Occupational History    Not on file   Tobacco Use    Smoking status: Never    Smokeless tobacco: Never   Substance and Sexual Activity    Alcohol use: No    Drug use: No    Sexual activity: Not Currently   Other Topics Concern    Not on file   Social History Narrative    Not on file     Social Determinants of Health     Financial Resource Strain: Not on file   Food Insecurity: Not on file   Transportation Needs: Not on file   Physical Activity: Not on file   Stress: Not on file   Social Connections: Not on file   Intimate Partner Violence: Not on file   Housing Stability: Not on file         ALLERGIES: Codeine, Flu vac 2015 (65 up)-mf59c(pf), Fluvirin 8202-7704, and Naprosyn [naproxen]    Review of Systems   Constitutional:  Negative for diaphoresis and fever. HENT:  Negative for facial swelling. Eyes:  Negative for visual disturbance. Respiratory:  Negative for cough. Cardiovascular:  Negative for chest pain. Gastrointestinal:  Negative for abdominal pain. Genitourinary:  Negative for dysuria. Musculoskeletal:  Negative for joint swelling. Skin:  Negative for rash. Neurological:  Negative for headaches. Hematological:  Negative for adenopathy. Psychiatric/Behavioral:  Negative for suicidal ideas. Vitals:    02/19/23 1455 02/19/23 1509 02/19/23 1523 02/19/23 1538   BP:  (!) 125/49  (!) 125/54   Pulse:  69 68    Resp:  19 18    Temp:       SpO2: 96%  97%    Weight:       Height:                Physical Exam  Vitals and nursing note reviewed. Constitutional:       General: He is not in acute distress. Appearance: He is well-developed. He is not ill-appearing. HENT:      Head: Normocephalic and atraumatic. Eyes:      Pupils: Pupils are equal, round, and reactive to light. Cardiovascular:      Rate and Rhythm: Normal rate. Pulmonary:      Effort: Pulmonary effort is normal. No respiratory distress. Abdominal:      General: There is no distension. Musculoskeletal:         General: Normal range of motion. Cervical back: Normal range of motion and neck supple. Skin:     General: Skin is warm and dry. Comments: Excoriated skin on both upper extremities. Erythema to his back. Neurological:      Mental Status: He is alert and oriented to person, place, and time. Medical Decision Making  Amount and/or Complexity of Data Reviewed  Labs: ordered. Radiology: ordered. ECG/medicine tests: ordered. Decision-making details documented in ED Course. Risk  Decision regarding hospitalization. ED Course as of 02/19/23 1609   Sun Feb 19, 2023   1431 EKG, 12 LEAD, INITIAL  ED EKG interpretation:  Rhythm: normal sinus rhythm. Rate (approx.): 73. Axis: left. ST segment:  No concerning ST elevations or depressions. This EKG was independently interpreted by Milton Estrada MD,ED Provider. [JM]      ED Course User Index  [JM] Aidan Ray MD     Perfect Serve Consult for Admission  4:08 PM    ED Room Number: WER04/04  Patient Name and age:  Hanna Cosme 81 y.o.  male  Working Diagnosis:   1. Weakness    2. Gait abnormality    3. Altered mental status, unspecified altered mental status type    4. Acute renal insufficiency        COVID-19 Suspicion:  no  Sepsis present:  no  Reassessment needed: no  Code Status:  Full Code  Readmission: no  Isolation Requirements:  no  Recommended Level of Care:  med/surg  Department: [facility] ED - [phone]  Admitting Provider:     Other: Daughter reports a sudden change in the patient's appetite and strength over the past 2 days. Now unable to ambulate and has not eaten or had fluids in 2 days. Creatinine is elevated at 1.5. Work-up otherwise unremarkable in the ED with a normal head CT.   Probably needs MRI definitely will need physical therapy and case management. Total critical care time spent exclusive of procedures:  0 minutes.     Procedures

## 2023-02-19 NOTE — ED NOTES
Pt used urinal to void, pt turned on to his left side with pillow placed behind his back. Call light within reach NAD Noted.

## 2023-02-19 NOTE — ED TRIAGE NOTES
Per ems patients family called 911 because the patient has had two days of weakness and unable to walk. Patients family said that the patient was placed on keflex and hydroxizine on Thursday for wounds that are self inflicted from scratching.

## 2023-02-20 ENCOUNTER — APPOINTMENT (OUTPATIENT)
Dept: ULTRASOUND IMAGING | Age: 88
End: 2023-02-20
Attending: INTERNAL MEDICINE
Payer: MEDICARE

## 2023-02-20 VITALS
SYSTOLIC BLOOD PRESSURE: 163 MMHG | TEMPERATURE: 98 F | HEART RATE: 104 BPM | OXYGEN SATURATION: 93 % | BODY MASS INDEX: 32.49 KG/M2 | DIASTOLIC BLOOD PRESSURE: 75 MMHG | HEIGHT: 67 IN | WEIGHT: 207 LBS | RESPIRATION RATE: 20 BRPM

## 2023-02-20 PROBLEM — Z96.649 S/P TOTAL HIP ARTHROPLASTY: Status: RESOLVED | Noted: 2018-01-15 | Resolved: 2023-02-20

## 2023-02-20 PROBLEM — C77.2 METASTASIS TO RETROPERITONEAL LYMPH NODE (HCC): Status: RESOLVED | Noted: 2019-11-07 | Resolved: 2023-02-20

## 2023-02-20 PROBLEM — M97.01XA PERIPROSTHETIC FRACTURE AROUND INTERNAL PROSTHETIC RIGHT HIP JOINT (HCC): Status: RESOLVED | Noted: 2018-02-03 | Resolved: 2023-02-20

## 2023-02-20 PROBLEM — M16.11 PRIMARY OSTEOARTHRITIS OF RIGHT HIP: Status: RESOLVED | Noted: 2018-01-15 | Resolved: 2023-02-20

## 2023-02-20 PROBLEM — E66.9 OBESE: Status: ACTIVE | Noted: 2023-02-20

## 2023-02-20 PROBLEM — M97.9XXA PERIPROSTHETIC FRACTURE AROUND INTERNAL PROSTHETIC JOINT: Status: RESOLVED | Noted: 2018-01-31 | Resolved: 2023-02-20

## 2023-02-20 PROBLEM — E86.0 DEHYDRATION: Status: ACTIVE | Noted: 2023-02-20

## 2023-02-20 PROBLEM — G62.9 NEUROPATHY: Status: ACTIVE | Noted: 2023-02-20

## 2023-02-20 PROBLEM — N17.9 AKI (ACUTE KIDNEY INJURY) (HCC): Status: ACTIVE | Noted: 2023-02-20

## 2023-02-20 PROBLEM — T14.8XXA EXCORIATION: Status: ACTIVE | Noted: 2023-02-20

## 2023-02-20 PROBLEM — M97.8XXA PERI-PROSTHETIC FRACTURE OF FEMUR FOLLOWING TOTAL HIP ARTHROPLASTY: Status: RESOLVED | Noted: 2018-01-30 | Resolved: 2023-02-20

## 2023-02-20 PROBLEM — Z96.649 PERI-PROSTHETIC FRACTURE OF FEMUR FOLLOWING TOTAL HIP ARTHROPLASTY: Status: RESOLVED | Noted: 2018-01-30 | Resolved: 2023-02-20

## 2023-02-20 LAB
ATRIAL RATE: 73 BPM
CALCULATED P AXIS, ECG09: 9 DEGREES
CALCULATED R AXIS, ECG10: -37 DEGREES
CALCULATED T AXIS, ECG11: 24 DEGREES
CHOLEST SERPL-MCNC: 158 MG/DL
DIAGNOSIS, 93000: NORMAL
ETHANOL SERPL-MCNC: <10 MG/DL
FERRITIN SERPL-MCNC: 65 NG/ML (ref 26–388)
FOLATE SERPL-MCNC: 20.5 NG/ML (ref 5–21)
HAPTOGLOB SERPL-MCNC: 294 MG/DL (ref 30–200)
HDLC SERPL-MCNC: 40 MG/DL
HDLC SERPL: 4 (ref 0–5)
IRON SATN MFR SERPL: 10 % (ref 20–50)
IRON SERPL-MCNC: 29 UG/DL (ref 35–150)
LDH SERPL L TO P-CCNC: 236 U/L (ref 85–241)
LDLC SERPL CALC-MCNC: 66.4 MG/DL (ref 0–100)
P-R INTERVAL, ECG05: 196 MS
PROCALCITONIN SERPL-MCNC: 0.11 NG/ML
Q-T INTERVAL, ECG07: 398 MS
QRS DURATION, ECG06: 102 MS
QTC CALCULATION (BEZET), ECG08: 438 MS
RETICS # AUTO: 0.06 M/UL (ref 0.03–0.1)
RETICS/RBC NFR AUTO: 1.6 % (ref 0.7–2.1)
TIBC SERPL-MCNC: 282 UG/DL (ref 250–450)
TRIGL SERPL-MCNC: 258 MG/DL (ref ?–150)
TSH SERPL DL<=0.05 MIU/L-ACNC: 1.07 UIU/ML (ref 0.36–3.74)
VENTRICULAR RATE, ECG03: 73 BPM
VIT B12 SERPL-MCNC: 261 PG/ML (ref 193–986)
VLDLC SERPL CALC-MCNC: 51.6 MG/DL

## 2023-02-20 PROCEDURE — 82728 ASSAY OF FERRITIN: CPT

## 2023-02-20 PROCEDURE — 97161 PT EVAL LOW COMPLEX 20 MIN: CPT

## 2023-02-20 PROCEDURE — 84443 ASSAY THYROID STIM HORMONE: CPT

## 2023-02-20 PROCEDURE — 83540 ASSAY OF IRON: CPT

## 2023-02-20 PROCEDURE — 74011000250 HC RX REV CODE- 250: Performed by: INTERNAL MEDICINE

## 2023-02-20 PROCEDURE — 83615 LACTATE (LD) (LDH) ENZYME: CPT

## 2023-02-20 PROCEDURE — 96372 THER/PROPH/DIAG INJ SC/IM: CPT

## 2023-02-20 PROCEDURE — 97530 THERAPEUTIC ACTIVITIES: CPT

## 2023-02-20 PROCEDURE — 84145 PROCALCITONIN (PCT): CPT

## 2023-02-20 PROCEDURE — 36415 COLL VENOUS BLD VENIPUNCTURE: CPT

## 2023-02-20 PROCEDURE — 74011250636 HC RX REV CODE- 250/636: Performed by: INTERNAL MEDICINE

## 2023-02-20 PROCEDURE — 82077 ASSAY SPEC XCP UR&BREATH IA: CPT

## 2023-02-20 PROCEDURE — 97535 SELF CARE MNGMENT TRAINING: CPT

## 2023-02-20 PROCEDURE — 82607 VITAMIN B-12: CPT

## 2023-02-20 PROCEDURE — 76770 US EXAM ABDO BACK WALL COMP: CPT

## 2023-02-20 PROCEDURE — 85045 AUTOMATED RETICULOCYTE COUNT: CPT

## 2023-02-20 PROCEDURE — 97165 OT EVAL LOW COMPLEX 30 MIN: CPT

## 2023-02-20 PROCEDURE — G0378 HOSPITAL OBSERVATION PER HR: HCPCS

## 2023-02-20 PROCEDURE — 82746 ASSAY OF FOLIC ACID SERUM: CPT

## 2023-02-20 PROCEDURE — 74011250637 HC RX REV CODE- 250/637: Performed by: INTERNAL MEDICINE

## 2023-02-20 PROCEDURE — 2709999900 HC NON-CHARGEABLE SUPPLY

## 2023-02-20 PROCEDURE — 77010033678 HC OXYGEN DAILY

## 2023-02-20 PROCEDURE — 83010 ASSAY OF HAPTOGLOBIN QUANT: CPT

## 2023-02-20 PROCEDURE — 94761 N-INVAS EAR/PLS OXIMETRY MLT: CPT

## 2023-02-20 PROCEDURE — 80061 LIPID PANEL: CPT

## 2023-02-20 RX ORDER — POTASSIUM CHLORIDE 750 MG/1
10 TABLET, FILM COATED, EXTENDED RELEASE ORAL DAILY
COMMUNITY

## 2023-02-20 RX ORDER — CAMPHOR
SPIRIT TOPICAL 3 TIMES DAILY
Status: DISCONTINUED | OUTPATIENT
Start: 2023-02-20 | End: 2023-02-20 | Stop reason: HOSPADM

## 2023-02-20 RX ORDER — GABAPENTIN 100 MG/1
100 CAPSULE ORAL 2 TIMES DAILY
Qty: 60 CAPSULE | Refills: 0 | Status: SHIPPED
Start: 2023-02-20 | End: 2023-03-22

## 2023-02-20 RX ORDER — CAMPHOR
1 SPIRIT TOPICAL 3 TIMES DAILY
Qty: 1 EACH | Refills: 0 | Status: SHIPPED | OUTPATIENT
Start: 2023-02-20 | End: 2023-03-02

## 2023-02-20 RX ORDER — DEXTROSE, SODIUM CHLORIDE, AND POTASSIUM CHLORIDE 5; .45; .3 G/100ML; G/100ML; G/100ML
INJECTION INTRAVENOUS CONTINUOUS
Status: DISCONTINUED | OUTPATIENT
Start: 2023-02-20 | End: 2023-02-20 | Stop reason: HOSPADM

## 2023-02-20 RX ADMIN — SODIUM CHLORIDE 1000 ML: 9 INJECTION, SOLUTION INTRAVENOUS at 09:57

## 2023-02-20 RX ADMIN — ASPIRIN 81 MG: 81 TABLET, CHEWABLE ORAL at 09:09

## 2023-02-20 RX ADMIN — BUSPIRONE HYDROCHLORIDE 10 MG: 10 TABLET ORAL at 17:13

## 2023-02-20 RX ADMIN — ATORVASTATIN CALCIUM 40 MG: 20 TABLET, FILM COATED ORAL at 09:09

## 2023-02-20 RX ADMIN — POTASSIUM CHLORIDE, DEXTROSE MONOHYDRATE AND SODIUM CHLORIDE: 300; 5; 450 INJECTION, SOLUTION INTRAVENOUS at 12:26

## 2023-02-20 RX ADMIN — FLUOXETINE 20 MG: 20 CAPSULE ORAL at 09:09

## 2023-02-20 RX ADMIN — ENOXAPARIN SODIUM 40 MG: 100 INJECTION SUBCUTANEOUS at 09:10

## 2023-02-20 RX ADMIN — FERRIC OXIDE RED: 8; 8 LOTION TOPICAL at 17:13

## 2023-02-20 RX ADMIN — FERRIC OXIDE RED: 8; 8 LOTION TOPICAL at 09:10

## 2023-02-20 RX ADMIN — SODIUM CHLORIDE, PRESERVATIVE FREE 10 ML: 5 INJECTION INTRAVENOUS at 06:50

## 2023-02-20 RX ADMIN — PANTOPRAZOLE SODIUM 40 MG: 40 TABLET, DELAYED RELEASE ORAL at 07:24

## 2023-02-20 RX ADMIN — BUSPIRONE HYDROCHLORIDE 10 MG: 10 TABLET ORAL at 09:09

## 2023-02-20 NOTE — H&P
Hospitalist Admission Note    NAME:  Mynor Bear   :  1935   MRN:  [id number]     Date/Time:  2023 10:11 PM    Patient PCP: Lydia Salvador MD  ________________________________________________________________________    Given the patient's current clinical presentation, I have a high level of concern for decompensation if discharged from the emergency department. Complex decision making was performed, which includes reviewing the patient's available past medical records, laboratory results, and x-ray films. My assessment of this patient's clinical condition and my plan of care is as follows. Assessment / Plan:    Acute Metabolic Encephalopathy:    The patient is brought in due to aletered mentation for the past 2 days. VSS  WBC normal, Hg 11.5  BUN 17, Cr 1.50  UA neg LE/neg nitrites  CXR - poor inspiratory effort - bibasilar atelectasis  CT Head - No acute intracranial abnormality. Chronic small vessel ischemic disease    Obs patient and cont to monitor. No clear etiology for acute changes in mental status. No evidence of UTI/PNA or infection. Recommend to hold Hydroxyzine and re-evalaute  Hold sedating/pain medications  If no improvement consider MRI for further evaluation    2. Depression:    Cont w/ Buspar 10mg bid and Prozac 20mg daily    3. HLD:    Cont w/ Lipitor 40mg daily    Body mass index is 32.42 kg/m².:  30.0 - 39.9:  Obese    I have personally reviewed the radiographs, laboratory data in Epic and decisions and statements above are based partially on this personal interpretation. Code Status: Full Code  Surrogate Decision Maker  Nellie Andrade (daughter)  [phone]    Prophylaxis:  Lovenox SQ     Subjective:   CHIEF COMPLAINT: I am itching    HISTORY OF PRESENT ILLNESS:       The patient is a 82 y/o C M w/ PMH Alzheimer and prostate cancer s/p prostatectomy () s/p radiating () and on Lupron who si brought in due to severe pruritus.   On my evaluation he is unable to give any history and only states about his itching. He does not answer any other questions. There is no family at the bedside to provide any additional details. ER notes that his daughter brought him in due to altered mental status and inability to walk over the past two days. His baseline mental status is unknown at this time. Past Medical History:   Diagnosis Date    Anxiety state, unspecified     Arthritis     Cancer ([address] 2001    prostate,     Depression     GERD (gastroesophageal reflux disease)     Hiatal hernia     Hypercholesteremia     Renal calculus     Seasonal allergic rhinitis       Past Surgical History:   Procedure Laterality Date    ENDOSCOPY, COLON, DIAGNOSTIC      HX APPENDECTOMY      HX CATARACT REMOVAL  2012    HX COLONOSCOPY      HX ENDOSCOPY      HX GI      colonoscopy    HX KNEE REPLACEMENT  02/18/14    right total knee    HX KNEE REPLACEMENT Left 10/2015    HX LITHOTRIPSY  11/2011    HX ORTHOPAEDIC  1992    rt. carpal tunnel    HX PROSTATECTOMY  2003    bowel incontinence since surgery    HX TONSILLECTOMY       Social History     Tobacco Use    Smoking status: Never    Smokeless tobacco: Never   Substance Use Topics    Alcohol use: No      Family History   Problem Relation Age of Onset    Heart Disease Mother     Stroke Mother     Cancer Father         COLON    Arthritis-rheumatoid Neg Hx     Malignant Hyperthermia Neg Hx     Pseudocholinesterase Deficiency Neg Hx     Delayed Awakening Neg Hx     Post-op Nausea/Vomiting Neg Hx     Post-op Cognitive Dysfunction Neg Hx     Emergence Delirium Neg Hx         Allergies   Allergen Reactions    Codeine Nausea and Vomiting    Flu Vac 2015 (65 Up)-Mf59c(Pf) Nausea Only    Fluvirin [id number] Other (comments)     GI upset    Naprosyn [Naproxen] Nausea and Vomiting     Gas          Prior to Admission medications    Medication Sig Start Date End Date Taking?  Authorizing Provider   gabapentin (NEURONTIN) 400 mg capsule Take 400 mg by mouth two (2) times a day. Yes Da, MD Mouna   cholecalciferol (VITAMIN D3) (5000 Units/125 mcg) tab tablet Take  by mouth daily. Yes Da, MD Mouna   aspirin 81 mg chewable tablet Take 81 mg by mouth daily. Yes Da, MD Mouna   acetaminophen (TYLENOL) 325 mg tablet Take  by mouth every four (4) hours as needed for Pain. Yes Provider, Historical   senna (SENOKOT) 8.6 mg tablet Take 1 Tab by mouth daily. Yes Provider, Historical   omeprazole (PRILOSEC) 20 mg capsule Take 20 mg by mouth daily. Yes Provider, Historical   cefUROXime (CEFTIN) 500 mg tablet Take 1 Tab by mouth two (2) times a day. Indications: Skin and Skin Structure Infection 2/5/18  Yes NABEEL Oliveira   busPIRone (BUSPAR) 10 mg tablet Take 10 mg by mouth two (2) times a day. Yes Provider, Historical   RABEprazole (ACIPHEX) 20 mg tablet Take 20 mg by mouth daily. Yes Provider, Historical   montelukast (SINGULAIR) 10 mg tablet Take 10 mg by mouth every evening. Yes Provider, Historical   ezetimibe (ZETIA) 10 mg tablet Take 10 mg by mouth daily. Yes Da, MD Mouna   fluoxetine (PROZAC) 20 mg capsule Take 1 capsule by mouth daily. 5/76/38  Yes Smooth Fan MD   simvastatin (ZOCOR) 40 mg tablet Take 1 tablet by mouth nightly. 9/73/90  Yes Smooth Fan MD   lorazepam (ATIVAN) 0.5 mg tablet Take 1 tablet by mouth two (2) times daily as needed for Anxiety. 1/28/15  Yes Smooth Fan MD   metoclopramide HCl (REGLAN) 10 mg tablet Take 1 Tab by mouth Before breakfast, lunch, dinner and at bedtime. 2/3/24  Yes Smooth Fan MD   polyethylene glycol (MIRALAX) 17 gram/dose powder Take 17 g by mouth daily. Provider, Historical   guaiFENesin (ROBITUSSIN) 100 mg/5 mL liquid Take 200 mg by mouth three (3) times daily as needed for Cough. Provider, Historical   magnesium hydroxide (MILK OF MAGNESIA) 400 mg/5 mL suspension Take 30 mL by mouth daily as needed for Constipation.     Provider, Historical   ondansetron (ZOFRAN ODT) 8 mg disintegrating tablet Take 0.5 Tabs by mouth every eight (8) hours as needed for Nausea.  1/15/18   Cindy Raman MD       REVIEW OF SYSTEMS:  See HPI for details    Patient was not able to provide review of systems due to altered mental status change  Objective:   VITALS:    Visit Vitals  BP (!) 147/69 (BP 1 Location: Left upper arm, BP Patient Position: At rest)   Pulse 70   Temp 98.4 °F (36.9 °C)   Resp 20   Ht 5' 7\" (1.702 m)   Wt 93.9 kg (207 lb)   SpO2 91%   BMI 32.42 kg/m²     PHYSICAL EXAM:    Physical Exam:    Gen: Well-developed, well-nourished, in no acute distress  HEENT:  Pink conjunctivae, PERRL, hearing intact to voice, moist mucous membranes  Neck: Supple, without masses, thyroid non-tender  Resp: No accessory muscle use, clear breath sounds without wheezes rales or rhonchi  Card: No murmurs, normal S1, S2 without thrills, bruits or peripheral edema  Abd:  Soft, non-tender, non-distended, normoactive bowel sounds are present, no palpable organomegaly and no detectable hernias  Lymph:  No cervical or inguinal adenopathy  Musc: No cyanosis or clubbing  Skin: No rashes or ulcers, skin turgor is good  Neuro:  Unable to complete as he is not following any commands or answering questions  Cranial nerves are grossly intact, no focal motor weakness, follows commands appropriately  Psych:  Unable to obtain as he is not answering any questions and seems confused    Skin w/ diffuse small macular lesions w/ scabs on his bilateral lower extremities    _________________________________________________________________  Care Plan discussed with:  Pt's condition, Imaging findings, Lab findings, Assessment, and Care Plan discussed with: Patient  _______________________________________________________________________    Probable disposition:  Home  ________________________________________________________________________    Comments   >50% of visit spent in counseling and coordination of care  Chart reviewed  Discussion with patient and/or family and questions answered     ________________________________________________________________________  Signed: David Vieyra MD        Procedures: see electronic medical records for all procedures/Xrays and details which were not copied into this note but were reviewed prior to creation of Plan. LAB DATA REVIEWED:    Recent Results (from the past 24 hour(s))   EKG, 12 LEAD, INITIAL    Collection Time: 02/19/23  2:27 PM   Result Value Ref Range    Ventricular Rate 73 BPM    Atrial Rate 73 BPM    P-R Interval 196 ms    QRS Duration 102 ms    Q-T Interval 398 ms    QTC Calculation (Bezet) 438 ms    Calculated P Axis 9 degrees    Calculated R Axis -37 degrees    Calculated T Axis 24 degrees    Diagnosis       Normal sinus rhythm  Left axis deviation  Incomplete right bundle branch block  Moderate voltage criteria for LVH, may be normal variant ( R in aVL , Benito   product )  Abnormal ECG  When compared with ECG of 30-JAN-2018 16:34,  No significant change was found     METABOLIC PANEL, COMPREHENSIVE    Collection Time: 02/19/23  2:29 PM   Result Value Ref Range    Sodium 143 136 - 145 mmol/L    Potassium 3.8 3.5 - 5.1 mmol/L    Chloride 103 97 - 108 mmol/L    CO2 32 21 - 32 mmol/L    Anion gap 8 5 - 15 mmol/L    Glucose 94 65 - 100 mg/dL    BUN 17 6 - 20 MG/DL    Creatinine 1.50 (H) 0.70 - 1.30 MG/DL    BUN/Creatinine ratio 11 (L) 12 - 20      eGFR 45 (L) >60 ml/min/1.73m2    Calcium 9.0 8.5 - 10.1 MG/DL    Bilirubin, total 0.7 0.2 - 1.0 MG/DL    ALT (SGPT) 25 12 - 78 U/L    AST (SGOT) 21 15 - 37 U/L    Alk.  phosphatase 119 (H) 45 - 117 U/L    Protein, total 7.0 6.4 - 8.2 g/dL    Albumin 2.9 (L) 3.5 - 5.0 g/dL    Globulin 4.1 (H) 2.0 - 4.0 g/dL    A-G Ratio 0.7 (L) 1.1 - 2.2     CBC WITH AUTOMATED DIFF    Collection Time: 02/19/23  2:29 PM   Result Value Ref Range    WBC 7.7 4.1 - 11.1 K/uL    RBC 4.12 4.10 - 5.70 M/uL    HGB 11.5 (L) 12.1 - 17.0 g/dL    HCT 37.3 36.6 - 50.3 %    MCV 90.5 80.0 - 99.0 FL    MCH 27.9 26.0 - 34.0 PG    MCHC 30.8 30.0 - 36.5 g/dL    RDW 14.9 (H) 11.5 - 14.5 %    PLATELET 056 584 - 776 K/uL    MPV 10.6 8.9 - 12.9 FL    NRBC 0.0 0.0  WBC    ABSOLUTE NRBC 0.00 0.00 - 0.01 K/uL    NEUTROPHILS 69 32 - 75 %    LYMPHOCYTES 13 12 - 49 %    MONOCYTES 10 5 - 13 %    EOSINOPHILS 7 0 - 7 %    BASOPHILS 0 0 - 1 %    IMMATURE GRANULOCYTES 1 (H) 0 - 0.5 %    ABS. NEUTROPHILS 5.3 1.8 - 8.0 K/UL    ABS. LYMPHOCYTES 1.0 0.8 - 3.5 K/UL    ABS. MONOCYTES 0.8 0.0 - 1.0 K/UL    ABS. EOSINOPHILS 0.6 (H) 0.0 - 0.4 K/UL    ABS. BASOPHILS 0.0 0.0 - 0.1 K/UL    ABS. IMM. GRANS. 0.1 (H) 0.00 - 0.04 K/UL    DF AUTOMATED     SAMPLES BEING HELD    Collection Time: 02/19/23  2:29 PM   Result Value Ref Range    SAMPLES BEING HELD 1BCPER     COMMENT        Add-on orders for these samples will be processed based on acceptable specimen integrity and analyte stability, which may vary by analyte. TROPONIN-HIGH SENSITIVITY    Collection Time: 02/19/23  2:29 PM   Result Value Ref Range    Troponin-High Sensitivity 19 0 - 76 ng/L   URINALYSIS W/MICROSCOPIC    Collection Time: 02/19/23  2:46 PM   Result Value Ref Range    Color YELLOW/STRAW      Appearance CLEAR CLEAR      Specific gravity 1.020 1.003 - 1.030      pH (UA) 5.5 5.0 - 8.0      Protein Negative NEG mg/dL    Glucose Negative NEG mg/dL    Ketone Negative NEG mg/dL    Bilirubin Negative NEG      Blood Negative NEG      Urobilinogen 0.2 0.2 - 1.0 EU/dL    Nitrites Negative NEG      Leukocyte Esterase Negative NEG      WBC 0-4 0 - 4 /hpf    RBC 0-5 0 - 5 /hpf    Epithelial cells FEW FEW /lpf    Bacteria Negative NEG /hpf   URINE CULTURE HOLD SAMPLE    Collection Time: 02/19/23  2:46 PM    Specimen: Urine   Result Value Ref Range    Urine culture hold        Urine on hold in Microbiology dept for 2 days. If unpreserved urine is submitted, it cannot be used for addtional testing after 24 hours, recollection will be required.

## 2023-02-20 NOTE — PROGRESS NOTES
Patient for discharge as ordered, after visit summary was given to patient itself and discussed the instruction to him as per his understanding, family cannot come to  the patient, ambulance was arranged and they scheduled him at [phone].

## 2023-02-20 NOTE — ED NOTES
TRANSFER - OUT REPORT:    Verbal report given to Bernardo Merritt RN ER on Dyan Fries  being transferred to ER for routine progression of care       Report consisted of patients Situation, Background, Assessment and   Recommendations(SBAR). Information from the following report(s) SBAR, ED Summary, and MAR, fall precautions, 2.5L n/c at baseline, npo, swallowed screen failed, test results, vitals, and skin integrity   was reviewed with the receiving nurse. Lines:   Peripheral IV 02/19/23 Right Antecubital (Active)   Site Assessment Clean, dry, & intact 02/19/23 1433   Phlebitis Assessment 0 02/19/23 1433   Infiltration Assessment 0 02/19/23 1433   Dressing Status Clean, dry, & intact 02/19/23 1433   Dressing Type Transparent 02/19/23 1433   Hub Color/Line Status Pink 02/19/23 1433        Opportunity for questions and clarification was provided. Patient transported with:   O2 @ 2.5 liters, H2H BLS monitoring, reassessment unchanged at this time, patient stable for transport as ordered.

## 2023-02-20 NOTE — PROGRESS NOTES
Problem: Self Care Deficits Care Plan (Adult)  Goal: *Acute Goals and Plan of Care (Insert Text)  Description: FUNCTIONAL STATUS PRIOR TO ADMISSION: Patient was stand by assist for functional transfers/mobility using RW in home and WC in community (stating daughter walks with him). Patient stating requiring assist for bathing and dressing (uses sock aid and shower chair) at home. HOME SUPPORT: The patient lived with daughter and required assist for self care and functional transfers/mobility. Occupational Therapy Goals  Initiated 2/20/2023  1. Patient will perform grooming with modified independence within 7 day(s). 2.  Patient will perform lower body dressing with modified independence within 7 day(s). 3.  Patient will perform lower body dressing with supervision/set-up within 7 day(s). 4.  Patient will perform toilet transfers with minimal assistance/contact guard assist within 7 day(s). 5.  Patient will perform all aspects of toileting with minimal assistance/contact guard assist within 7 day(s). 6.  Patient will participate in upper extremity therapeutic exercise/activities with independence for 10 minutes within 7 day(s). 7.  Patient will utilize energy conservation techniques during functional activities with verbal cues within 7 day(s). Outcome: Not Met     OCCUPATIONAL THERAPY EVALUATION  Patient: Josefa Benitez (32 y.o. male)  Date: 2/20/2023  Primary Diagnosis: Weakness [C12.0]  Acute metabolic encephalopathy [N90.71]       Precautions: falls       ASSESSMENT  Patient received semi supine in bed A&OX4 (with increased time as patient HOB) and agreeable for OT eval/tx. Per pt report, pt lives with daughter in a one level home with ramp to enter and requires assist for self care (assist bathing/dressing) and SBA for functional transfers/mobility using RW in home and WC in community (pt stating daughter walks next to him at home) and reporting use of O2 at home via NC.      Patient presents with decreased balance, decreased activity tolerance, generalized weakness, extremely [place] and increased need for assist with self care (SBA grooming EOB with increased time, total A LB dressing seated EOB, SBA self feeding with set up EOB/chair level) and functional transfers/mobility (CGA sup->sit and scooting EOB increased time with HOB raised, max A sit<->stand with use of becki steady and gait belt for dependent transfer to recliner). Patient would benefit from continued skilled OT services while at [facility] in order to increase safety and independence with self care and functional transfers/mobility. Recommend discharge to SNF when medically appropriate. Functional Outcome Measure: The patient scored Total: 45/100 on the Barthel Index outcome measure     Other factors to consider for discharge: time since onset, severity of deficits, PLOF          PLAN :  Recommendations and Planned Interventions: self care training, functional mobility training, therapeutic exercise, balance training, therapeutic activities, endurance activities, patient education, and home safety training    Frequency/Duration: Patient will be followed by occupational therapy 5 times a week to address goals. Recommendation for discharge: (in order for the patient to meet his/her long term goals)  Therapy up to 5 days/week in SNF setting    This discharge recommendation:  Has been made in collaboration with the attending provider and/or case management    IF patient discharges home will need the following DME: TBD       SUBJECTIVE:   Patient stated I have to sit up to eat because of my hiatal heria.     OBJECTIVE DATA SUMMARY:   HISTORY:   Past Medical History:   Diagnosis Date    Anxiety and depression     Arthritis     GERD (gastroesophageal reflux disease)     Hiatal hernia     Hypercholesteremia     Neuropathy     Obese     Prostate cancer ([address]     Renal calculus     Seasonal allergic rhinitis      Past Surgical History: Procedure Laterality Date    ENDOSCOPY, COLON, DIAGNOSTIC      HX APPENDECTOMY      HX CATARACT REMOVAL  2012    HX COLONOSCOPY      HX ENDOSCOPY      HX GI      colonoscopy    HX KNEE REPLACEMENT  02/18/14    right total knee    HX KNEE REPLACEMENT Left 10/2015    HX LITHOTRIPSY  11/2011    HX ORTHOPAEDIC  1992    rt. carpal tunnel    HX PROSTATECTOMY  2003    bowel incontinence since surgery    HX TONSILLECTOMY         Expanded or extensive additional review of patient history:     Home Situation  Home Environment: Private residence  # Steps to Enter: 1  Rails to Enter: Yes  Wheelchair Ramp: Yes  One/Two Story Residence: One story  Living Alone: No  Support Systems: Child(key), Other Family Member(s)  Patient Expects to be Discharged to[de-identified] Home  Current DME Used/Available at Home: Wheelchair, Walker, rolling, Oxygen, portable, Shower chair (sock aid)    EXAMINATION OF PERFORMANCE DEFICITS:  Cognitive/Behavioral Status:  Neurologic State: Alert  Orientation Level: Oriented X4  Cognition: Follows commands     Hearing: Auditory  Auditory Impairment: Hard of hearing, bilateral, Hard of hearing, left side    Range of Motion:  AROM: Generally decreased, functional  PROM: Generally decreased, functional     Strength:  Strength: Generally decreased, functional (abad UE grossly observed to be 3+/5)     Coordination:  Coordination: Generally decreased, functional       Balance:  Sitting: Intact; High guard  Sitting - Static: Good (unsupported)  Sitting - Dynamic: Fair (occasional)  Standing: Impaired; With support  Standing - Static: Constant support;Poor (standing with becki steady)    Functional Mobility and Transfers for ADLs:  Bed Mobility:  Rolling: Moderate assistance  Supine to Sit: Additional time;Bed Modified;Contact guard assistance  Sit to Supine: Minimum assistance  Scooting: Contact guard assistance; Additional time;Bed Modified    Transfers:  Sit to Stand: Maximum assistance; Additional time (with becki steady)  Stand to Sit: Maximum assistance; Additional time (becki steady)  Bed to Chair: Total assistance (becki steady)  Assistive Device : Gait Belt (becki steady)    ADL Assessment:  Feeding: Stand-by assistance;Setup; Additional time    Oral Facial Hygiene/Grooming: Stand-by assistance    Lower Body Dressing: Total assistance (no sock aid present)    ADL Intervention and task modifications:  Feeding  Feeding Assistance: Stand-by assistance  Cutting Food: Stand-by assistance  Utensil Management: Stand-by assistance  Food to Mouth: Stand-by assistance    Grooming  Grooming Assistance: Stand-by assistance  Position Performed: Seated edge of bed  Washing Face: Stand-by assistance  Washing Hands: Stand-by assistance  Brushing Teeth: Stand-by assistance    Lower Body Dressing Assistance  Socks: Total assistance (dependent)  Position Performed: Seated edge of bed    Functional Measure:    Barthel Index:  Bathin  Bladder: 10  Bowels: 10  Groomin  Dressin  Feeding: 10  Mobility: 0  Stairs: 0  Toilet Use: 0  Transfer (Bed to Chair and Back): 5  Total: 45/100      The Barthel ADL Index: Guidelines  1. The index should be used as a record of what a patient does, not as a record of what a patient could do. 2. The main aim is to establish degree of independence from any help, physical or verbal, however minor and for whatever reason. 3. The need for supervision renders the patient not independent. 4. A patient's performance should be established using the best available evidence. Asking the patient, friends/relatives and nurses are the usual sources, but direct observation and common sense are also important. However direct testing is not needed. 5. Usually the patient's performance over the preceding 24-48 hours is important, but occasionally longer periods will be relevant. 6. Middle categories imply that the patient supplies over 50 per cent of the effort. 7. Use of aids to be independent is allowed.     Score Interpretation (from [address] [facility])    Independent   60-79 Minimally independent   40-59 Partially dependent   20-39 Very dependent   <20 Totally dependent     -Umu Palacios., Barthel, D.W. (1965). Functional evaluation: the Barthel Index. [address] ([address] (1997). The Barthel activities of daily living index: self-reporting versus actual performance in the old (> or = 75 years). Journal of [address] [place] [address] 45(7), [facility], J..M.F, Bertha Lindsay., [place] [facility]. (1999). Measuring the change in disability after inpatient rehabilitation; comparison of the responsiveness of the Barthel Index and Functional [place] Measure. Journal of Neurology, Neurosurgery, and Psychiatry, 66(4), 372-965. BEATA Guy, MARCEL Encarnacion, & Skyler Wilson MAurelioA. (2004) Assessment of post-stroke quality of life in cost-effectiveness studies: The usefulness of the Barthel Index and the EuroQoL-5D. Quality of Life Research, 15, 560-61     Occupational Therapy Evaluation Charge Determination   History Examination Decision-Making   LOW Complexity : Brief history review  MEDIUM Complexity : 3-5 performance deficits relating to physical, cognitive , or psychosocial skils that result in activity limitations and / or participation restrictions MEDIUM Complexity : Patient may present with comorbidities that affect occupational performnce.  Miniml to moderate modification of tasks or assistance (eg, physical or verbal ) with assesment(s) is necessary to enable patient to complete evaluation       Based on the above components, the patient evaluation is determined to be of the following complexity level: LOW   Pain Rating:  No pain reported    Activity Tolerance:   Fair and requires rest breaks    After treatment patient left in no apparent distress:    Sitting in chair, Call bell within reach, Bed / chair alarm activated, and PCT present    COMMUNICATION/EDUCATION:   The patients plan of care was discussed with: Physical therapist and Registered nurse. Home safety education was provided and the patient/caregiver indicated understanding. and Patient/family have participated as able in goal setting and plan of care. This patients plan of care is appropriate for delegation to [facility].     Thank you for this referral.  Russell Grier  Time Calculation: 46 mins

## 2023-02-20 NOTE — PROGRESS NOTES
[facility]  [address], [facility] [address]  [phone]    Hospitalist Progress Note      NAME: Leticia Arteaga   :  1935  MRM:  [id number]    Date/Time of service 2023  12:41 PM         Assessment and Plan:     Acute metabolic encephalopathy / Anxiety and depression / Insomnia - POA, worse than baseline likely due to itching/insomnia, meds (hydroxyzine, gabapentin), SHIRA. No sign of CVA. I suspect underlying dementia. Would benefit form outpatient neuropsych testing. Continue buspirone and fluoxetine. Check TSH    SHIRA (acute kidney injury) / Hx Prostate cancer / Dehydration - POA due to poor PO intake. Hydrate and follow. Check US for any obstruction. Weakness / Arthritis / Neuropathy - POA, worse than baseline. Fall precautions. PT OT eval. Hold gabapentin. Continue tylenol prn    Excoriation / Rash - POA, for days, unclear etiology. Calamine cream.  Outpatient dermatology follow up. To me it looks like bed bug bites. Hypertension - BP adequately controlled on    Hyperlipidemia - Check lipid panel and continue statin. No hx of CAD nor CVA to demand tight control. GERD (gastroesophageal reflux disease) / Hiatal hernia - PPI    Obese - Advise weight loss and outpatient MAURILIO testing       Subjective:     Chief Complaint:  weak, calm    ROS:  (bold if positive, if negative)    Tolerating some PT  Tolerating Diet        Objective:     Last 24hrs VS reviewed since prior progress note.  Most recent are:    Visit Vitals  BP (!) 119/59 (BP 1 Location: Left upper arm, BP Patient Position: At rest)   Pulse 80   Temp 98.4 °F (36.9 °C)   Resp 20   Ht 5' 7\" (1.702 m)   Wt 93.9 kg (207 lb)   SpO2 97%   BMI 32.42 kg/m²     SpO2 Readings from Last 6 Encounters:   23 97%   20 91%   19 91%   18 94%   18 93%   18 94%    O2 Flow Rate (L/min): 2.5 l/min     Intake/Output Summary (Last 24 hours) at 2023 3203  Last data filed at 2/20/2023 1385  Gross per 24 hour   Intake 1240 ml   Output 400 ml   Net 840 ml        Physical Exam:    Gen:  Obese, in no acute distress  HEENT:  Pink conjunctivae, PERRL, hearing intact to voice, moist mucous membranes  Neck:  Supple, without masses, thyroid non-tender  Resp:  No accessory muscle use, clear breath sounds without wheezes rales or rhonchi  Card:  No murmurs, normal S1, S2 without thrills, bruits or peripheral edema  Abd:  Soft, non-tender, non-distended, normoactive bowel sounds are present, no mass  Lymph:  No cervical or inguinal adenopathy  Musc:  No cyanosis or clubbing  Skin:  No rashes or ulcers, skin turgor is good  Neuro:  Cranial nerves are grossly intact, no focal motor weakness, follows commands   Psych:  Poor insight, oriented to person, place and time, alert    Telemetry reviewed:   normal sinus rhythm  __________________________________________________________________  Medications Reviewed: (see below)  Medications:     Current Facility-Administered Medications   Medication Dose Route Frequency    dextrose 5% - 0.45% NaCl with KCl 40 mEq/L infusion   IntraVENous CONTINUOUS    sodium chloride 0.9 % bolus infusion 1,000 mL  1,000 mL IntraVENous ONCE    aspirin chewable tablet 81 mg  81 mg Oral DAILY    busPIRone (BUSPAR) tablet 10 mg  10 mg Oral BID    FLUoxetine (PROzac) capsule 20 mg  20 mg Oral DAILY    pantoprazole (PROTONIX) tablet 40 mg  40 mg Oral ACB    atorvastatin (LIPITOR) tablet 40 mg  40 mg Oral DAILY    sodium chloride (NS) flush 5-40 mL  5-40 mL IntraVENous Q8H    sodium chloride (NS) flush 5-40 mL  5-40 mL IntraVENous PRN    acetaminophen (TYLENOL) tablet 650 mg  650 mg Oral Q6H PRN    polyethylene glycol (MIRALAX) packet 17 g  17 g Oral DAILY PRN    ondansetron (ZOFRAN) injection 4 mg  4 mg IntraVENous Q6H PRN    enoxaparin (LOVENOX) injection 40 mg  40 mg SubCUTAneous DAILY        Lab Data Reviewed: (see below)  Lab Review:     Recent Labs     02/19/23  1429   WBC 7. 7   HGB 11.5*   HCT 37.3        Recent Labs     02/19/23  1429      K 3.8      CO2 32   GLU 94   BUN 17   CREA 1.50*   CA 9.0   ALB 2.9*   TBILI 0.7   ALT 25     Lab Results   Component Value Date/Time    Glucose (POC) 103 09/11/2014 06:48 PM    Glucose (POC) 118 (H) 06/19/2010 08:08 PM    Glucose  07/05/2013 01:23 PM     No results for input(s): PH, PCO2, PO2, HCO3, FIO2 in the last 72 hours. No results for input(s): INR, INREXT, INREXT in the last 72 hours. All Micro Results       Procedure Component Value Units Date/Time    URINE CULTURE HOLD SAMPLE [[id number]] Collected: 02/19/23 1446    Order Status: Completed Specimen: Urine Updated: 02/19/23 1453     Urine culture hold       Urine on hold in Microbiology dept for 2 days. If unpreserved urine is submitted, it cannot be used for addtional testing after 24 hours, recollection will be required. Other pertinent lab: none    Total time spent with patient: 30 Minutes I personally reviewed chart, notes, data and current medications in the medical record. I have personally examined and treated the patient at bedside during this period. To assist coordination of care and communication with nursing and staff, this note may be preliminary early in the day, but finalized by end of the day.                  Care Plan discussed with: Patient, Care Manager, Nursing Staff, and >50% of time spent in counseling and coordination of care    Discussed:  Care Plan and D/C Planning    Prophylaxis:  Lovenox and H2B/PPI    Disposition:  Home w/Family           ___________________________________________________    Attending Physician: Jw Anderson MD

## 2023-02-20 NOTE — PROGRESS NOTES
Bedside and Verbal shift change report given to Marcello Lazcano RN (oncoming nurse) by Yefri Sewell RN (offgoing nurse). Report included the following information SBAR, Kardex, ED Summary, Intake/Output, MAR, and Recent Results.

## 2023-02-20 NOTE — PROGRESS NOTES
Hospital Follow-up appointment attempted but only got Voice mail and I asked for Dr. Kyle Whiting Nurse to call Patient and schedule a Hospital Follow-up appointment.   Kalin Palmer CMS

## 2023-02-20 NOTE — PROGRESS NOTES
Problem: Mobility Impaired (Adult and Pediatric)  Goal: *Acute Goals and Plan of Care (Insert Text)  Description: FUNCTIONAL STATUS PRIOR TO ADMISSION: Patient was able to ambulate short distances in the home with use of a rolling walker, but primarily used wgeelchair for mobility outside of the home. HOME SUPPORT PRIOR TO ADMISSION: The patient lived alone with wife to provide assistance. Physical Therapy Goals  Initiated 2/20/2023  1. Patient will move from supine to sit and sit to supine , scoot up and down, and roll side to side in bed with supervision/set-up within 7 day(s). 2.  Patient will transfer from bed to chair and chair to bed with minimal assistance/contact guard assist using the least restrictive device within 7 day(s). 3.  Patient will perform sit to stand with minimal assistance/contact guard assist within 7 day(s). 4.  Patient will ambulate with minimal assistance/contact guard assist for 5 feet with the least restrictive device within 7 day(s). 5.  Patient will ascend/descend 1 stairs with 1 handrail(s) with minimal assistance/contact guard assist within 7 day(s). Outcome: Progressing Towards Goal     PHYSICAL THERAPY EVALUATION  Patient: Arnulfo Roberson (77 y.o. male)  Date: 2/20/2023  Primary Diagnosis: Weakness [N69.2]  Acute metabolic encephalopathy [U43.89]       Precautions: fall       ASSESSMENT  Based on the objective data described below, the patient presents with confusion, decreased activity tolerance, delayed response/command following, poor balance, and severely impaired mobility. Patient was received in supine with HOB elevated without O2 via NC. Patient is hearing impaired and requires additional time for response to prompts, responds best with mask removed to allow lip reading. Patient was A&O x4 and agreeable to treatment.  At baseline, patient ambulates very short distances within the home using RW, but relies on wheelchair for longer distances or when outside of the home. Today the patient required Mod+ assistance for bed mobility, and max A x2 for transfers and standing activities. Patient O2 was at 89% at rest on RA, but improved to 94% when O2 resumed at 2.5L. Requires max cues for upright posture, and manual assistance for side stepping with RLE. Recommend use of Florence Cowing Steady for transfers at this time. Patient is currently below baseline and is not appropriate for d/c to home due to mobility impairments. Would benefit from skilled therapy to address deficits. Current Level of Function Impacting Discharge (mobility/balance): Max A x2 for standing, requires assist for bed mobility, unable to ambulate safely. Functional Outcome Measure: The patient scored 1/28 on the Tinetti outcome measure which is indicative of very high fall risk. Other factors to consider for discharge: Decreased insight, O2 at baseline     Patient will benefit from skilled therapy intervention to address the above noted impairments. PLAN :  Recommendations and Planned Interventions: bed mobility training, transfer training, gait training, therapeutic exercises, neuromuscular re-education, modalities, edema management/control, patient and family training/education, and therapeutic activities      Frequency/Duration: Patient will be followed by physical therapy:  5 times a week to address goals. Recommendation for discharge: (in order for the patient to meet his/her long term goals)  Therapy up to 5 days/week in SNF setting    This discharge recommendation:  Has not yet been discussed the attending provider and/or case management    IF patient discharges home will need the following DME: to be determined (TBD)         SUBJECTIVE:   Patient stated I'll go back to the bed.     OBJECTIVE DATA SUMMARY:   HISTORY:    Past Medical History:   Diagnosis Date    Anxiety and depression     Arthritis     GERD (gastroesophageal reflux disease)     Hiatal hernia     Hypercholesteremia Neuropathy     Obese     Prostate cancer ([facility] [address]     Renal calculus     Seasonal allergic rhinitis      Past Surgical History:   Procedure Laterality Date    ENDOSCOPY, COLON, DIAGNOSTIC      HX APPENDECTOMY      HX CATARACT REMOVAL  2012    HX COLONOSCOPY      HX ENDOSCOPY      HX GI      colonoscopy    HX KNEE REPLACEMENT  02/18/14    right total knee    HX KNEE REPLACEMENT Left 10/2015    HX LITHOTRIPSY  11/2011    HX ORTHOPAEDIC  1992    rt. carpal tunnel    HX PROSTATECTOMY  2003    bowel incontinence since surgery    HX TONSILLECTOMY         Personal factors and/or comorbidities impacting plan of care: confusion/decreased insight into deficits, neuropathy, anxiety/depression, obesity    Home Situation  Home Environment: Private residence  # Steps to Enter: 1  Rails to Enter: Yes  Wheelchair Ramp: Yes  One/Two Story Residence: One story  Living Alone: No  Support Systems: Child(key), Other Family Member(s)  Patient Expects to be Discharged to[de-identified] Home with home health  Current DME Used/Available at Home: Wheelchair, Walker, rolling, Oxygen, portable, Shower chair (sock aid)    EXAMINATION/PRESENTATION/DECISION MAKING:   Critical Behavior:  Neurologic State: Alert  Orientation Level: Oriented X4  Cognition: Follows commands     Hearing: Auditory  Auditory Impairment: Hard of hearing, bilateral, Hard of hearing, left side      Range Of Motion:  AROM: Generally decreased, functional           PROM: Generally decreased, functional           Strength:    Strength: Generally decreased, functional (abad UE grossly observed to be 3+/5)                    Coordination:  Coordination: Generally decreased, functional      Functional Mobility:  Bed Mobility:  Rolling: Moderate assistance  Supine to Sit: Additional time;Bed Modified;Contact guard assistance  Sit to Supine: Minimum assistance  Scooting: Contact guard assistance; Additional time;Bed Modified  Transfers:  Sit to Stand: Maximum assistance; Additional time (with becki steady)  Stand to Sit: Maximum assistance; Additional time (becki steady)        Bed to Chair: Total assistance (becki steady)              Balance:   Sitting: Intact; High guard  Sitting - Static: Good (unsupported)  Sitting - Dynamic: Fair (occasional)  Standing: Impaired; With support  Standing - Static: Constant support;Poor (standing with becki steady)  Ambulation/Gait Training:  Distance (ft): 2 Feet (ft)  Assistive Device: Gait belt;Walker, rolling  Ambulation - Level of Assistance: Maximum assistance (manual assist for side stepping)     Gait Description (WDL): Exceptions to WDL                           Therapeutic Exercises:   See functional mobility    Functional Measure:  Tinetti test:    Sitting Balance: 0  Arises: 0  Attempts to Rise: 0  Immediate Standing Balance: 0  Standing Balance: 0  Nudged: 0  Eyes Closed: 0  Turn 360 Degrees - Continuous/Discontinuous: 0  Turn 360 Degrees - Steady/Unsteady: 0  Sitting Down: 1  Balance Score: 1 Balance total score  Indication of Gait: 0  R Step Length/Height: 0  L Step Length/Height: 0  R Foot Clearance: 0  L Foot Clearance: 0  Step Symmetry: 0  Step Continuity: 0  Path: 0  Trunk: 0  Walking Time: 0  Gait Score: 0 Gait total score  Total Score: 1/28 Overall total score         Tinetti Tool Score Risk of Falls  <19 = High Fall Risk  19-24 = Moderate Fall Risk  25-28 = Low Fall Risk  Tinetti ME. Performance-Oriented Assessment of Mobility Problems in Elderly Patients. Yusuf 66; [id number].  (Scoring Description: PT Bulletin Feb. 10, 1993)    Older adults: Jose Cantor et al, 2009; n = 1000 [facility] elderly evaluated with ABC, IRENA, ADL, and IADL)  · Mean IRENA score for males aged 69-68 years = 26.21(3.40)  · Mean IRENA score for females age 69-68 years = 25.16(4.30)  · Mean IRENA score for males over 80 years = 23.29(6.02)  · Mean IRENA score for females over 80 years = 17.20(8.32)            Physical Therapy Evaluation Charge Determination   History Examination Presentation Decision-Making   HIGH Complexity :3+ comorbidities / personal factors will impact the outcome/ POC  MEDIUM Complexity : 3 Standardized tests and measures addressing body structure, function, activity limitation and / or participation in recreation  MEDIUM Complexity : Evolving with changing characteristics  Other outcome measures Tinetti  HIGH       Based on the above components, the patient evaluation is determined to be of the following complexity level: MEDIUM    Pain Rating:  None given    Activity Tolerance:   Poor, desaturates with exertion and requires oxygen, and requires frequent rest breaks    After treatment patient left in no apparent distress:   Supine in bed, Call bell within reach, Bed / chair alarm activated, and Side rails x 3    COMMUNICATION/EDUCATION:   The patients plan of care was discussed with: Occupational therapist and Registered nurse. Fall prevention education was provided and the patient/caregiver indicated understanding., Patient/family have participated as able in goal setting and plan of care. , and Patient/family agree to work toward stated goals and plan of care.     Thank you for this referral.  Maria Del Carmen Valladares, SPT

## 2023-02-20 NOTE — ED NOTES
Nurse informed charge that she was unsuccessful with giving report to nurse receiving pt and had also let the floor know that transport was still on their way to pick pt up.   Charge nurse called nursing supervisor to let her know and to also let her know that [facility] was on their way and that transport had been set up initially when the pt was going ED to ED

## 2023-02-20 NOTE — DISCHARGE INSTRUCTIONS
Patient Discharge Instructions    Autumn Watkins / [id number] : 1935    Admitted 2023 Discharged: 2023     Primary Diagnoses  @Rprob@    Take Home Medications     It is important that you take the medication exactly as they are prescribed. Keep your medication in the bottles provided by the pharmacist and keep a list of the medication names, dosages, and times to be taken in your wallet. Do not take other medications without consulting your doctor. What to do at Home    Recommended diet: Regular Diet and Increase noncaffeinated fluids    Recommended activity: Activity as tolerated and PT/OT per Home Health    If you experience worse symptoms, please follow up with your PCP. Follow-up with your PCP in a few weeks    [unfilled]     Information obtained by :  I understand that if any problems occur once I am at home I am to contact my physician. I understand and acknowledge receipt of the instructions indicated above.                                                                                                                                            Physician's or R.N.'s Signature                                                                  Date/Time                                                                                                                                              Patient or Representative Signature                                                          Date/Time

## 2023-02-20 NOTE — ED NOTES
Attempt x 1 to call report to 5th floor, informed nurse unavailable and would return call when available.

## 2023-02-20 NOTE — PROGRESS NOTES
Medicare Outpatient Observation Notice (MOON)/ [place] Outpatient Observation Notice (Saul Wick) provided to patient/representative with verbal explanation of the notice. Time allotted for questions regarding the notice. Patient /representative provided a completed copy of the MOON/VOON notice. Copy placed on bedside chart.   Harjit Ramírez CMS

## 2023-02-20 NOTE — ED NOTES
Bedside and Verbal shift change report given to Anna Perez (oncoming nurse) by Romain Bush (offgoing nurse). Report included the following information SBAR, skin integrity, swallow failed, NPO, emtala completed, test results, vitals, and home NC 2.5L and fall precautions, opportunity for all questions provided. Dia Graham

## 2023-02-20 NOTE — DISCHARGE SUMMARY
Physician Discharge Summary     Patient ID:  Dieter Haley  [id number]  81 y.o.  1935    Admit date: 2/19/2023    Discharge date of service and time: 2/20/2023  Greater than 30 minutes were spent providing discharge related services for this patient    Admission Diagnoses: Weakness [W45.1]  Acute metabolic encephalopathy [Q48.05]    Discharge Diagnoses:    Principal Diagnosis     Acute metabolic encephalopathy                                            Hospital Course and other diagnoses  Acute metabolic encephalopathy / Anxiety and depression / Insomnia - POA, worse than baseline likely due to itching/insomnia, meds (hydroxyzine, gabapentin, reglan, ativan), SHIRA. No sign of CVA. I suspect underlying dementia. Would benefit form outpatient neuropsych testing. Continue buspirone and fluoxetine. Normal TSH     SHIRA (acute kidney injury) / Hx Prostate cancer / Dehydration - POA due to poor PO intake. Hydrate and follow. US without obstruction. Weakness / Arthritis / Neuropathy - POA, worse than baseline. Fall precautions. PT OT eval recommends SNF, but wife refuses, wants HH PT OT. Reduce gabapentin. Continue tylenol prn     Excoriation / Rash - POA, for days, unclear etiology. Calamine cream.  Outpatient dermatology follow up. To me it looks like bed bug bites. Hypertension - BP adequately controlled without meds     Hyperlipidemia - Check lipid panel and continue statin. No hx of CAD nor CVA to demand tight control. GERD (gastroesophageal reflux disease) / Hiatal hernia - PPI     Obese - Advise weight loss and outpatient MAURILIO testing     PCP: Benja Mccurdy MD    Consults: None    Significant Diagnostic Studies: See Hospital Course    Discharged home in improved condition.     Discharge Exam:  BP (!) 119/59 (BP 1 Location: Left upper arm, BP Patient Position: At rest)   Pulse 80   Temp 98.4 °F (36.9 °C)   Resp 20   Ht 5' 7\" (1.702 m)   Wt 93.9 kg (207 lb)   SpO2 97%   BMI 32.42 kg/m² Gen:  Obese, in no acute distress  HEENT:  Pink conjunctivae, PERRL, hearing intact to voice, moist mucous membranes  Neck:  Supple, without masses, thyroid non-tender  Resp:  No accessory muscle use, clear breath sounds without wheezes rales or rhonchi  Card:  No murmurs, normal S1, S2 without thrills, bruits or peripheral edema  Abd:  Soft, non-tender, non-distended, normoactive bowel sounds are present, no mass  Lymph:  No cervical or inguinal adenopathy  Musc:  No cyanosis or clubbing  Skin:  No rashes or ulcers, skin turgor is good  Neuro:  Cranial nerves are grossly intact, no focal motor weakness, follows commands   Psych:  Poor insight, oriented to person, place and time, alert    Patient Instructions:   Current Discharge Medication List        START taking these medications    Details   calamine-zinc oxide (CALAMINE) solution Apply 1 Bottle to affected area three (3) times daily for 10 days. Qty: 1 Each, Refills: 0           CONTINUE these medications which have CHANGED    Details   gabapentin (NEURONTIN) 100 mg capsule Take 1 Capsule by mouth two (2) times a day for 30 days. Max Daily Amount: 200 mg. Qty: 60 Capsule, Refills: 0    Associated Diagnoses: Gait abnormality           CONTINUE these medications which have NOT CHANGED    Details   potassium chloride SR (KLOR-CON 10) 10 mEq tablet Take 10 mEq by mouth daily. cholecalciferol (VITAMIN D3) (5000 Units/125 mcg) tab tablet Take  by mouth daily. aspirin 81 mg chewable tablet Take 81 mg by mouth daily. acetaminophen (TYLENOL) 325 mg tablet Take  by mouth every four (4) hours as needed for Pain. omeprazole (PRILOSEC) 20 mg capsule Take 20 mg by mouth daily. busPIRone (BUSPAR) 10 mg tablet Take 10 mg by mouth two (2) times a day. RABEprazole (ACIPHEX) 20 mg tablet Take 20 mg by mouth daily.       montelukast (SINGULAIR) 10 mg tablet Take 10 mg by mouth every evening.      ezetimibe (ZETIA) 10 mg tablet Take 10 mg by mouth daily. fluoxetine (PROZAC) 20 mg capsule Take 1 capsule by mouth daily. Qty: 30 capsule, Refills: 3    Associated Diagnoses: Anxiety      simvastatin (ZOCOR) 40 mg tablet Take 1 tablet by mouth nightly. Qty: 90 tablet, Refills: 1    Associated Diagnoses: Hypercholesteremia      polyethylene glycol (MIRALAX) 17 gram/dose powder Take 17 g by mouth daily. ondansetron (ZOFRAN ODT) 8 mg disintegrating tablet Take 0.5 Tabs by mouth every eight (8) hours as needed for Nausea. Qty: 30 Tab, Refills: 0    Associated Diagnoses: Primary osteoarthritis of right hip           STOP taking these medications       cefUROXime (CEFTIN) 500 mg tablet Comments:   Reason for Stopping:         lorazepam (ATIVAN) 0.5 mg tablet Comments:   Reason for Stopping:         metoclopramide HCl (REGLAN) 10 mg tablet Comments:   Reason for Stopping:         guaiFENesin (ROBITUSSIN) 100 mg/5 mL liquid Comments:   Reason for Stopping:         senna (SENOKOT) 8.6 mg tablet Comments:   Reason for Stopping:         magnesium hydroxide (MILK OF MAGNESIA) 400 mg/5 mL suspension Comments:   Reason for Stopping:             Activity: Activity as tolerated and PT/OT per Home Health  Diet: Regular Diet  Wound Care: None needed    Follow-up with your PCP in a few weeks.   Follow-up tests/labs - none    Signed:  Brody Cross MD  2/20/2023  12:45 PM

## 2023-02-20 NOTE — ED NOTES
Return call from receiving nurse at [facility], report given to [employer].

## 2023-02-20 NOTE — PROGRESS NOTES
SPEECH THERAPY SCREENING:  SERVICES ARE NOT INDICATED AT THIS TIME    [facility] triggered for speech therapy based on results obtained during the nursing admission assessment. The patients chart was reviewed and the patient is not appropriate for a skilled therapy evaluation at this time. Please consult speech therapy if any therapy needs arise. Thank you. Renu Clark, SLP          RN in ER ordered SLP consult. NO reason provided. He hs passed  [employer] and is on regular diet. IF SLP  order needed, please obtain MD ORDER.

## 2023-02-20 NOTE — PROGRESS NOTES
Problem: Aspiration - Risk of  Goal: *Absence of aspiration  Outcome: Progressing Towards Goal     Problem: Patient Education: Go to Patient Education Activity  Goal: Patient/Family Education  Outcome: Progressing Towards Goal     Problem: Falls - Risk of  Goal: *Absence of Falls  Description: Document Camryn Sagastume Fall Risk and appropriate interventions in the flowsheet. Outcome: Progressing Towards Goal  Note: Fall Risk Interventions:            Problem: Patient Education: Go to Patient Education Activity  Goal: Patient/Family Education  Outcome: Progressing Towards Goal     Problem: Impaired Skin Integrity/Pressure Injury Treatment  Goal: *Improvement of Existing Pressure Injury  Outcome: Progressing Towards Goal  Goal: *Prevention of pressure injury  Description: Document Srikanth Scale and appropriate interventions in the flowsheet. Outcome: Progressing Towards Goal  Note: Pressure Injury Interventions:  Sensory Interventions: Check visual cues for pain, Float heels, Keep linens dry and wrinkle-free, Maintain/enhance activity level, Minimize linen layers, Pressure redistribution bed/mattress (bed type), Turn and reposition approx. every two hours (pillows and wedges if needed)    Moisture Interventions: Absorbent underpads, Minimize layers, Moisture barrier    Activity Interventions: Pressure redistribution bed/mattress(bed type), PT/OT evaluation    Mobility Interventions: Float heels, Pressure redistribution bed/mattress (bed type), PT/OT evaluation, Turn and reposition approx.  every two hours(pillow and wedges)         Friction and Shear Interventions: Lift sheet, Minimize layers          Problem: Patient Education: Go to Patient Education Activity  Goal: Patient/Family Education  Outcome: Progressing Towards Goal

## 2023-02-20 NOTE — PROGRESS NOTES
2/20/2023    Addendum 2:40 PM: Per [facility] 5pm not available, ETA is 6:30pm    SAUD Hill    Addendum 2:09 PM: Added OT and bridge to home program for patient with home health as family would definitively want him to go home vs. SNF. Per RN patient unable to get up unassisted (needed max A x2 and Jen Rajas) so sent referral to [facility] for stretcher transport. Asked for 5pm, will update when time is confirmed. SAUD Hill    Addendum 1:52 PM: confirmed patient is open to [facility], resumption orders sent in [employer]. SAUD Hill    Reason for Admission:  Acute metabolic encephalopathy                   RUR Score:          12% green/low risk for readmission           Plan for utilizing home health:      Open to [facility]    PCP: First and Last name:  Dr Stacie Walls   Name of Practice:    Are you a current patient: Yes/No:  Yes   Approximate date of last visit: Last week on Thursday   Can you participate in a virtual visit with your PCP:                     Current Advanced Directive/Advance Care Plan: Full Code    Healthcare Decision Maker:   Click here to complete [id number] including selection of the Healthcare Decision Maker Relationship (ie \"Primary\")           Next of kin is daughter Lotus Cerda                  Transition of Care Plan:                    Patient lives with his daughter and her family. Typically he is independent in all aspects, and relies on family for transportation. He walks with a walker at baseline, and they also have a wheelchair for him at home. He is open to [facility] for home health, and I will reach out to them to see what they need from me. Patient sees Dr Mary Taylor for primary care and last visit was Thursday 2/16. He uses [facility] for prescriptions and has insurance coverage. His emergency contact is his daughter Lotus Cerda who can be reached at [phone]. No other needs noted at this time. Will continue to follow.      Transition of [address] with family assistance and   Follow up outpatient as indicated  Family can transport at discharge  CM will continue to follow     Care Management Interventions  PCP Verified by CM: Yes (Dr Fabio Ham)  Mode of Transport at Discharge:  Other (see comment) (family)  Transition of Care Consult (CM Consult): Discharge Planning, [address]: Child(key), Other Family Member(s)  Confirm Follow Up Transport: Family  Discharge Location  Patient Expects to be Discharged to[de-identified] Home with home health    SAUD Zelaya

## 2023-02-21 NOTE — PROGRESS NOTES
[facility] here to  patient. Patient remains alert and oriented x 3. Denies any pain or discomfort. IV removed. No acute distress noted.

## 2023-02-21 NOTE — PROGRESS NOTES
Bedside and Verbal shift change report given to Campbell Jane   (oncoming nurse) by Miguel Christianson RN (offgoing nurse). Report included the following information SBAR, Kardex, Procedure Summary, MAR, and Recent Results.

## 2023-03-22 PROBLEM — E86.0 DEHYDRATION: Status: RESOLVED | Noted: 2023-02-20 | Resolved: 2023-03-22

## (undated) DEVICE — SUTURE VCRL SZ 2-0 L36IN ABSRB UD L36MM CT-1 1/2 CIR J945H

## (undated) DEVICE — STERILE POLYISOPRENE POWDER-FREE SURGICAL GLOVES: Brand: PROTEXIS

## (undated) DEVICE — SUTURE STRATAFIX SYMMETRIC SZ 1 L18IN ABSRB VLT CT1 L36CM SXPP1A404

## (undated) DEVICE — (D)PREP SKN CHLRAPRP APPL 26ML -- CONVERT TO ITEM 371833

## (undated) DEVICE — COVER LT HNDL BLU PLAS

## (undated) DEVICE — SUTURE MCRYL SZ 3-0 L27IN ABSRB UD L24MM PS-1 3/8 CIR PRIM Y936H

## (undated) DEVICE — STERILE POLYISOPRENE POWDER-FREE SURGICAL GLOVES WITH EMOLLIENT COATING: Brand: PROTEXIS

## (undated) DEVICE — ABDOMINAL PAD: Brand: DERMACEA

## (undated) DEVICE — T4 HOOD

## (undated) DEVICE — 3M™ DURAPORE™ SURGICAL TAPE 1538-3, 3 INCH X 10 YARD (7,5CM X 9,1M), 4 ROLLS/BOX: Brand: 3M™ DURAPORE™

## (undated) DEVICE — DUAL IRRIGATION ADAPTOR

## (undated) DEVICE — DRSG AQUACEL SURG 3.5 X 10IN -- CONVERT TO ITEM 370183

## (undated) DEVICE — 6619 2 PTNT ISO SYS INCISE AREA&LT;(&GT;&&LT;)&GT;P: Brand: STERI-DRAPE™ IOBAN™ 2

## (undated) DEVICE — Device

## (undated) DEVICE — SYR LR LCK 1ML GRAD NSAF 30ML --

## (undated) DEVICE — HANDPIECE SET WITH COAXIAL HIGH FLOW TIP AND SUCTION TUBE: Brand: INTERPULSE

## (undated) DEVICE — (D)BNDG COHESIVE 6X5YD TAN LTX -- DUPE USE ITEM 357348

## (undated) DEVICE — SUTURE STRATAFIX SPRL SZ 1 L14IN ABSRB VLT L48CM CTX 1/2 SXPD2B405

## (undated) DEVICE — BASIC PACK: Brand: CONVERTORS

## (undated) DEVICE — STAPLER SKIN 35CT WD STRL DISP -- MULTIFIRE PREMIUM

## (undated) DEVICE — TIBURON SPLIT SHEET: Brand: CONVERTORS

## (undated) DEVICE — HOOK LOCK LATEX FREE ELASTIC BANDAGE D/L 6INX10YD

## (undated) DEVICE — Z DISCONTINUED USE 2744636  DRESSING AQUACEL 14 IN ALG W3.5XL14IN POLYUR FLM CVR W/ HYDRCOLL

## (undated) DEVICE — SUTURE VCRL SZ 2-0 L36IN ABSRB UD L40MM CT 1/2 CIR J957H

## (undated) DEVICE — STRYKER PERFORMANCE SERIES SAGITTAL BLADE: Brand: STRYKER PERFORMANCE SERIES

## (undated) DEVICE — DEVON™ KNEE AND BODY STRAP 60" X 3" (1.5 M X 7.6 CM): Brand: DEVON

## (undated) DEVICE — DERMABOND SKIN ADH 0.7ML -- DERMABOND ADVANCED 12/BX

## (undated) DEVICE — GAUZE SPONGES,12 PLY: Brand: CURITY

## (undated) DEVICE — SURGICAL PROCEDURE PACK BASIN MAJ SET CUST NO CAUT

## (undated) DEVICE — 3000CC GUARDIAN II: Brand: GUARDIAN

## (undated) DEVICE — DRAPE,REIN 53X77,STERILE: Brand: MEDLINE

## (undated) DEVICE — REM POLYHESIVE ADULT PATIENT RETURN ELECTRODE: Brand: VALLEYLAB

## (undated) DEVICE — NEEDLE HYPO 18GA L1.5IN PNK S STL HUB POLYPR SHLD REG BVL

## (undated) DEVICE — GOWN,SIRUS,NONRNF,SETINSLV,2XL,18/CS: Brand: MEDLINE

## (undated) DEVICE — NDL PRT INJ NSAF BLNT 18GX1.5 --

## (undated) DEVICE — PREP KIT PEEL PTCH POVIDONE IOD

## (undated) DEVICE — ROCKER SWITCH PENCIL BLADE ELECTRODE, HOLSTER: Brand: EDGE

## (undated) DEVICE — TIP SUCT BLU PLAS BLB W/O CTRL VENT YANK

## (undated) DEVICE — DRESSING PETRO GZ XRFRM CURAD ST OVERWRAP 5 X 9 IN

## (undated) DEVICE — INFECTION CONTROL KIT SYS

## (undated) DEVICE — DRAPE XR C ARM 41X74IN LF --

## (undated) DEVICE — INTENDED FOR TISSUE SEPARATION, AND OTHER PROCEDURES THAT REQUIRE A SHARP SURGICAL BLADE TO PUNCTURE OR CUT.: Brand: BARD-PARKER ® CARBON RIB-BACK BLADES

## (undated) DEVICE — 1010 S-DRAPE TOWEL DRAPE 10/BX: Brand: STERI-DRAPE™

## (undated) DEVICE — SOLUTION IRRIG 3000ML 0.9% SOD CHL FLX CONT 0797208] ICU MEDICAL INC]

## (undated) DEVICE — SOLUTION IV 1000ML 0.9% SOD CHL

## (undated) DEVICE — LIGHT HANDLE: Brand: DEVON

## (undated) DEVICE — SUTURE VCRL + SZ 1-0 L36IN ABSRB UD CTX 1/2 CIR TAPR PNT VCP977H

## (undated) DEVICE — SKIN MARKER,REGULAR TIP WITH RULER AND LABELS: Brand: DEVON